# Patient Record
Sex: FEMALE | Race: WHITE | Employment: OTHER | URBAN - METROPOLITAN AREA
[De-identification: names, ages, dates, MRNs, and addresses within clinical notes are randomized per-mention and may not be internally consistent; named-entity substitution may affect disease eponyms.]

---

## 2017-03-13 PROBLEM — G45.9 TIA (TRANSIENT ISCHEMIC ATTACK): Status: ACTIVE | Noted: 2017-03-13

## 2017-03-13 PROBLEM — I10 ESSENTIAL HYPERTENSION, BENIGN: Status: ACTIVE | Noted: 2017-03-13

## 2017-05-09 ENCOUNTER — HOSPITAL ENCOUNTER (OUTPATIENT)
Dept: MAMMOGRAPHY | Age: 82
Discharge: HOME | End: 2017-05-09
Attending: FAMILY MEDICINE
Payer: MEDICARE

## 2017-05-09 DIAGNOSIS — Z12.31 VISIT FOR SCREENING MAMMOGRAM: ICD-10-CM

## 2017-05-09 PROCEDURE — 77067 SCR MAMMO BI INCL CAD: CPT

## 2017-07-14 ENCOUNTER — HOSPITAL ENCOUNTER (OUTPATIENT)
Dept: INFUSION THERAPY | Age: 82
Discharge: HOME | End: 2017-07-14
Payer: MEDICARE

## 2017-07-14 VITALS
OXYGEN SATURATION: 100 % | HEART RATE: 68 BPM | TEMPERATURE: 98 F | SYSTOLIC BLOOD PRESSURE: 135 MMHG | DIASTOLIC BLOOD PRESSURE: 56 MMHG | RESPIRATION RATE: 16 BRPM

## 2017-07-14 LAB — CALCIUM SERPL-MCNC: 8.9 MG/DL (ref 8.3–10.4)

## 2017-07-14 PROCEDURE — 74011250636 HC RX REV CODE- 250/636: Performed by: FAMILY MEDICINE

## 2017-07-14 PROCEDURE — 82310 ASSAY OF CALCIUM: CPT | Performed by: FAMILY MEDICINE

## 2017-07-14 PROCEDURE — 96372 THER/PROPH/DIAG INJ SC/IM: CPT

## 2017-07-14 RX ADMIN — DENOSUMAB 60 MG: 60 INJECTION SUBCUTANEOUS at 14:30

## 2017-07-14 NOTE — PROGRESS NOTES
Arrived to the [facility]. Prolia injection completed. Patient tolerated well and was observed post injection for 30 minutes. Any issues or concerns during appointment: NO.  Pt will have follow up with  PCP before next appointment. Discharged ambulatory.

## 2017-07-14 NOTE — PROGRESS NOTES
Calcium lab drawn from Right antecubital with a 23G butterfly vacutainer set. Pt does not want to wait for lab results since she has another  Appointment. She will return this afternoon for her prolia injection.

## 2017-12-04 ENCOUNTER — HOSPITAL ENCOUNTER (OUTPATIENT)
Dept: LAB | Age: 82
Discharge: HOME | End: 2017-12-04
Payer: MEDICARE

## 2017-12-04 DIAGNOSIS — D64.9 ANEMIA, UNSPECIFIED TYPE: ICD-10-CM

## 2017-12-04 PROBLEM — D63.1 ANEMIA IN CHRONIC KIDNEY DISEASE: Status: ACTIVE | Noted: 2017-12-04

## 2017-12-04 PROBLEM — N18.9 ANEMIA IN CHRONIC KIDNEY DISEASE: Status: ACTIVE | Noted: 2017-12-04

## 2017-12-04 LAB
ALBUMIN SERPL-MCNC: 3.4 G/DL (ref 3.2–4.6)
ALBUMIN/GLOB SERPL: 0.9 {RATIO} (ref 1.2–3.5)
ALP SERPL-CCNC: 57 U/L (ref 50–136)
ALT SERPL-CCNC: 14 U/L (ref 12–65)
ANION GAP SERPL CALC-SCNC: 7 MMOL/L (ref 7–16)
AST SERPL-CCNC: 17 U/L (ref 15–37)
BASOPHILS # BLD: 0 K/UL (ref 0–0.2)
BASOPHILS NFR BLD: 0 % (ref 0–2)
BILIRUB SERPL-MCNC: 0.3 MG/DL (ref 0.2–1.1)
BUN SERPL-MCNC: 9 MG/DL (ref 8–23)
CALCIUM SERPL-MCNC: 8.2 MG/DL (ref 8.3–10.4)
CHLORIDE SERPL-SCNC: 109 MMOL/L (ref 98–107)
CO2 SERPL-SCNC: 26 MMOL/L (ref 21–32)
CREAT SERPL-MCNC: 0.62 MG/DL (ref 0.6–1)
DIFFERENTIAL METHOD BLD: ABNORMAL
EOSINOPHIL # BLD: 0 K/UL (ref 0–0.8)
EOSINOPHIL NFR BLD: 0 % (ref 0.5–7.8)
ERYTHROCYTE [DISTWIDTH] IN BLOOD BY AUTOMATED COUNT: 18.9 % (ref 11.9–14.6)
FERRITIN SERPL-MCNC: 3 NG/ML (ref 8–388)
FOLATE SERPL-MCNC: 8.2 NG/ML (ref 3.1–17.5)
GLOBULIN SER CALC-MCNC: 3.7 G/DL (ref 2.3–3.5)
GLUCOSE SERPL-MCNC: 84 MG/DL (ref 65–100)
HCT VFR BLD AUTO: 27.8 % (ref 35.8–46.3)
HGB BLD-MCNC: 8 G/DL (ref 11.7–15.4)
IRON SATN MFR SERPL: 3 %
IRON SERPL-MCNC: 15 UG/DL (ref 35–150)
LYMPHOCYTES # BLD: 1.3 K/UL (ref 0.5–4.6)
LYMPHOCYTES NFR BLD: 20 % (ref 13–44)
MCH RBC QN AUTO: 19.5 PG (ref 26.1–32.9)
MCHC RBC AUTO-ENTMCNC: 28.8 G/DL (ref 31.4–35)
MCV RBC AUTO: 67.8 FL (ref 79.6–97.8)
MONOCYTES # BLD: 0.7 K/UL (ref 0.1–1.3)
MONOCYTES NFR BLD: 11 % (ref 4–12)
NEUTS SEG # BLD: 4.6 K/UL (ref 1.7–8.2)
NEUTS SEG NFR BLD: 69 % (ref 43–78)
NRBC # BLD: 0 K/UL (ref 0–0.2)
NRBC BLD-RTO: 0 PER 100 WBC (ref 0–2)
PLATELET # BLD AUTO: 256 K/UL (ref 150–450)
PMV BLD AUTO: 8.3 FL (ref 10.8–14.1)
POTASSIUM SERPL-SCNC: 3.5 MMOL/L (ref 3.5–5.1)
PROT SERPL-MCNC: 7.1 G/DL (ref 6.3–8.2)
RBC # BLD AUTO: 4.1 M/UL (ref 4.05–5.25)
SODIUM SERPL-SCNC: 142 MMOL/L (ref 136–145)
TIBC SERPL-MCNC: 477 UG/DL (ref 250–450)
VIT B12 SERPL-MCNC: 151 PG/ML (ref 193–986)
WBC # BLD AUTO: 6.6 K/UL (ref 4.3–11.1)

## 2017-12-04 PROCEDURE — 80053 COMPREHEN METABOLIC PANEL: CPT | Performed by: INTERNAL MEDICINE

## 2017-12-04 PROCEDURE — 82746 ASSAY OF FOLIC ACID SERUM: CPT | Performed by: INTERNAL MEDICINE

## 2017-12-04 PROCEDURE — 85025 COMPLETE CBC W/AUTO DIFF WBC: CPT | Performed by: INTERNAL MEDICINE

## 2017-12-04 PROCEDURE — 82607 VITAMIN B-12: CPT | Performed by: INTERNAL MEDICINE

## 2017-12-04 PROCEDURE — 83540 ASSAY OF IRON: CPT | Performed by: INTERNAL MEDICINE

## 2017-12-04 PROCEDURE — 36415 COLL VENOUS BLD VENIPUNCTURE: CPT | Performed by: INTERNAL MEDICINE

## 2017-12-04 PROCEDURE — 82728 ASSAY OF FERRITIN: CPT | Performed by: INTERNAL MEDICINE

## 2017-12-08 ENCOUNTER — HOSPITAL ENCOUNTER (OUTPATIENT)
Dept: INFUSION THERAPY | Age: 82
End: 2017-12-08
Payer: MEDICARE

## 2017-12-09 ENCOUNTER — HOSPITAL ENCOUNTER (OUTPATIENT)
Dept: INFUSION THERAPY | Age: 82
Discharge: HOME | End: 2017-12-09
Payer: MEDICARE

## 2017-12-09 VITALS
HEART RATE: 70 BPM | TEMPERATURE: 98.1 F | SYSTOLIC BLOOD PRESSURE: 177 MMHG | RESPIRATION RATE: 18 BRPM | DIASTOLIC BLOOD PRESSURE: 69 MMHG | OXYGEN SATURATION: 97 %

## 2017-12-09 DIAGNOSIS — N18.9 ANEMIA IN CHRONIC KIDNEY DISEASE, UNSPECIFIED CKD STAGE: ICD-10-CM

## 2017-12-09 DIAGNOSIS — D63.1 ANEMIA IN CHRONIC KIDNEY DISEASE, UNSPECIFIED CKD STAGE: ICD-10-CM

## 2017-12-09 PROCEDURE — 74011250636 HC RX REV CODE- 250/636: Performed by: INTERNAL MEDICINE

## 2017-12-09 PROCEDURE — 96372 THER/PROPH/DIAG INJ SC/IM: CPT

## 2017-12-09 PROCEDURE — 96365 THER/PROPH/DIAG IV INF INIT: CPT

## 2017-12-09 RX ORDER — CYANOCOBALAMIN 1000 UG/ML
1000 INJECTION, SOLUTION INTRAMUSCULAR; SUBCUTANEOUS ONCE
Status: COMPLETED | OUTPATIENT
Start: 2017-12-09 | End: 2017-12-09

## 2017-12-09 RX ORDER — SODIUM CHLORIDE 0.9 % (FLUSH) 0.9 %
10 SYRINGE (ML) INJECTION AS NEEDED
Status: ACTIVE | OUTPATIENT
Start: 2017-12-09 | End: 2017-12-10

## 2017-12-09 RX ADMIN — SODIUM CHLORIDE 500 ML: 900 INJECTION, SOLUTION INTRAVENOUS at 13:58

## 2017-12-09 RX ADMIN — FERRIC CARBOXYMALTOSE INJECTION 750 MG: 50 INJECTION, SOLUTION INTRAVENOUS at 14:02

## 2017-12-09 RX ADMIN — CYANOCOBALAMIN 1000 MCG: 1000 INJECTION, SOLUTION INTRAMUSCULAR at 14:55

## 2017-12-09 NOTE — PROGRESS NOTES
Arrived to [facility] infusion. Injectafer and B12 IM completed and tolerated well. No questions or concerns noted. Pt aware of next infusion appointment on 12/18/17 at 3pm. Discharged ambulatory with .

## 2017-12-18 ENCOUNTER — HOSPITAL ENCOUNTER (OUTPATIENT)
Dept: INFUSION THERAPY | Age: 82
Discharge: HOME | End: 2017-12-18
Payer: MEDICARE

## 2017-12-18 VITALS
HEART RATE: 63 BPM | WEIGHT: 127 LBS | RESPIRATION RATE: 18 BRPM | TEMPERATURE: 98 F | OXYGEN SATURATION: 63 % | DIASTOLIC BLOOD PRESSURE: 76 MMHG | BODY MASS INDEX: 23.23 KG/M2 | SYSTOLIC BLOOD PRESSURE: 193 MMHG

## 2017-12-18 DIAGNOSIS — D63.1 ANEMIA IN CHRONIC KIDNEY DISEASE, UNSPECIFIED CKD STAGE: ICD-10-CM

## 2017-12-18 DIAGNOSIS — N18.9 ANEMIA IN CHRONIC KIDNEY DISEASE, UNSPECIFIED CKD STAGE: ICD-10-CM

## 2017-12-18 PROCEDURE — 96365 THER/PROPH/DIAG IV INF INIT: CPT

## 2017-12-18 PROCEDURE — 74011250636 HC RX REV CODE- 250/636: Performed by: INTERNAL MEDICINE

## 2017-12-18 PROCEDURE — 96361 HYDRATE IV INFUSION ADD-ON: CPT

## 2017-12-18 PROCEDURE — 96372 THER/PROPH/DIAG INJ SC/IM: CPT

## 2017-12-18 RX ORDER — SODIUM CHLORIDE 0.9 % (FLUSH) 0.9 %
10 SYRINGE (ML) INJECTION AS NEEDED
Status: ACTIVE | OUTPATIENT
Start: 2017-12-18 | End: 2017-12-19

## 2017-12-18 RX ORDER — CYANOCOBALAMIN 1000 UG/ML
1000 INJECTION, SOLUTION INTRAMUSCULAR; SUBCUTANEOUS ONCE
Status: COMPLETED | OUTPATIENT
Start: 2017-12-18 | End: 2017-12-18

## 2017-12-18 RX ADMIN — Medication 10 ML: at 15:23

## 2017-12-18 RX ADMIN — CYANOCOBALAMIN 1000 MCG: 1000 INJECTION, SOLUTION INTRAMUSCULAR at 15:22

## 2017-12-18 RX ADMIN — SODIUM CHLORIDE 500 ML: 900 INJECTION, SOLUTION INTRAVENOUS at 15:45

## 2017-12-18 RX ADMIN — FERRIC CARBOXYMALTOSE INJECTION 750 MG: 50 INJECTION, SOLUTION INTRAVENOUS at 15:25

## 2017-12-18 NOTE — PROGRESS NOTES
Arrived to the [facility]. Injectafer/Vit B 12 completed. Patient tolerated well. Any issues or concerns during appointment: None. Patient aware of next appointment on March 9th at 21 [phone] with [facility]. Discharged ambulatory.

## 2018-02-19 ENCOUNTER — HOSPITAL ENCOUNTER (OUTPATIENT)
Dept: MAMMOGRAPHY | Age: 83
Discharge: HOME | End: 2018-02-19
Attending: FAMILY MEDICINE
Payer: MEDICARE

## 2018-02-19 DIAGNOSIS — N63.25 BREAST LUMP ON LEFT SIDE AT 3 O'CLOCK POSITION: ICD-10-CM

## 2018-02-19 PROCEDURE — 77066 DX MAMMO INCL CAD BI: CPT

## 2018-02-19 PROCEDURE — 76642 ULTRASOUND BREAST LIMITED: CPT

## 2018-03-09 ENCOUNTER — HOSPITAL ENCOUNTER (OUTPATIENT)
Dept: LAB | Age: 83
Discharge: HOME | End: 2018-03-09
Payer: MEDICARE

## 2018-03-09 DIAGNOSIS — N18.9 ANEMIA IN CHRONIC KIDNEY DISEASE, UNSPECIFIED CKD STAGE: ICD-10-CM

## 2018-03-09 DIAGNOSIS — D63.1 ANEMIA IN CHRONIC KIDNEY DISEASE, UNSPECIFIED CKD STAGE: ICD-10-CM

## 2018-03-09 LAB
BASOPHILS # BLD: 0 K/UL (ref 0–0.2)
BASOPHILS NFR BLD: 0 % (ref 0–2)
DIFFERENTIAL METHOD BLD: ABNORMAL
EOSINOPHIL # BLD: 0 K/UL (ref 0–0.8)
EOSINOPHIL NFR BLD: 1 % (ref 0.5–7.8)
ERYTHROCYTE [DISTWIDTH] IN BLOOD BY AUTOMATED COUNT: 19.2 % (ref 11.9–14.6)
FERRITIN SERPL-MCNC: 197 NG/ML (ref 8–388)
HCT VFR BLD AUTO: 38.9 % (ref 35.8–46.3)
HGB BLD-MCNC: 13.1 G/DL (ref 11.7–15.4)
IRON SATN MFR SERPL: 30 %
IRON SERPL-MCNC: 78 UG/DL (ref 35–150)
LYMPHOCYTES # BLD: 1.4 K/UL (ref 0.5–4.6)
LYMPHOCYTES NFR BLD: 34 % (ref 13–44)
MCH RBC QN AUTO: 30 PG (ref 26.1–32.9)
MCHC RBC AUTO-ENTMCNC: 33.7 G/DL (ref 31.4–35)
MCV RBC AUTO: 89 FL (ref 79.6–97.8)
MONOCYTES # BLD: 0.4 K/UL (ref 0.1–1.3)
MONOCYTES NFR BLD: 11 % (ref 4–12)
NEUTS SEG # BLD: 2.2 K/UL (ref 1.7–8.2)
NEUTS SEG NFR BLD: 55 % (ref 43–78)
NRBC # BLD: 0 K/UL (ref 0–0.2)
PLATELET # BLD AUTO: 160 K/UL (ref 150–450)
PMV BLD AUTO: 8.5 FL (ref 10.8–14.1)
RBC # BLD AUTO: 4.37 M/UL (ref 4.05–5.25)
TIBC SERPL-MCNC: 260 UG/DL (ref 250–450)
VIT B12 SERPL-MCNC: 289 PG/ML (ref 193–986)
WBC # BLD AUTO: 4.1 K/UL (ref 4.3–11.1)

## 2018-03-09 PROCEDURE — 85025 COMPLETE CBC W/AUTO DIFF WBC: CPT | Performed by: INTERNAL MEDICINE

## 2018-03-09 PROCEDURE — 36415 COLL VENOUS BLD VENIPUNCTURE: CPT | Performed by: INTERNAL MEDICINE

## 2018-03-09 PROCEDURE — 82607 VITAMIN B-12: CPT | Performed by: INTERNAL MEDICINE

## 2018-03-09 PROCEDURE — 82728 ASSAY OF FERRITIN: CPT | Performed by: INTERNAL MEDICINE

## 2018-03-09 PROCEDURE — 83540 ASSAY OF IRON: CPT | Performed by: INTERNAL MEDICINE

## 2018-04-16 PROBLEM — D50.9 IRON DEFICIENCY ANEMIA: Status: ACTIVE | Noted: 2018-04-16

## 2019-01-17 ENCOUNTER — HOSPITAL ENCOUNTER (OUTPATIENT)
Dept: LAB | Age: 84
Discharge: HOME | End: 2019-01-17
Payer: MEDICARE

## 2019-01-17 DIAGNOSIS — I25.119 ATHEROSCLEROSIS OF NATIVE CORONARY ARTERY OF NATIVE HEART WITH ANGINA PECTORIS (HCC): ICD-10-CM

## 2019-01-17 DIAGNOSIS — E78.5 HYPERLIPIDEMIA, UNSPECIFIED HYPERLIPIDEMIA TYPE: ICD-10-CM

## 2019-01-17 DIAGNOSIS — I10 ESSENTIAL HYPERTENSION, BENIGN: ICD-10-CM

## 2019-01-17 LAB
BASOPHILS # BLD: 0 K/UL (ref 0–0.2)
BASOPHILS NFR BLD: 0 % (ref 0–2)
DIFFERENTIAL METHOD BLD: ABNORMAL
EOSINOPHIL # BLD: 0 K/UL (ref 0–0.8)
EOSINOPHIL NFR BLD: 0 % (ref 0.5–7.8)
ERYTHROCYTE [DISTWIDTH] IN BLOOD BY AUTOMATED COUNT: 13.1 % (ref 11.9–14.6)
HCT VFR BLD AUTO: 43.8 % (ref 35.8–46.3)
HGB BLD-MCNC: 14.6 G/DL (ref 11.7–15.4)
IMM GRANULOCYTES # BLD AUTO: 0 K/UL (ref 0–0.5)
IMM GRANULOCYTES NFR BLD AUTO: 0 % (ref 0–5)
LYMPHOCYTES # BLD: 1.8 K/UL (ref 0.5–4.6)
LYMPHOCYTES NFR BLD: 39 % (ref 13–44)
MCH RBC QN AUTO: 31.1 PG (ref 26.1–32.9)
MCHC RBC AUTO-ENTMCNC: 33.3 G/DL (ref 31.4–35)
MCV RBC AUTO: 93.2 FL (ref 79.6–97.8)
MONOCYTES # BLD: 0.5 K/UL (ref 0.1–1.3)
MONOCYTES NFR BLD: 10 % (ref 4–12)
NEUTS SEG # BLD: 2.4 K/UL (ref 1.7–8.2)
NEUTS SEG NFR BLD: 51 % (ref 43–78)
NRBC # BLD: 0 K/UL (ref 0–0.2)
PLATELET # BLD AUTO: 188 K/UL (ref 150–450)
PMV BLD AUTO: 9.1 FL (ref 9.4–12.3)
RBC # BLD AUTO: 4.7 M/UL (ref 4.05–5.2)
WBC # BLD AUTO: 4.7 K/UL (ref 4.3–11.1)

## 2019-01-17 PROCEDURE — 85025 COMPLETE CBC W/AUTO DIFF WBC: CPT

## 2019-01-17 PROCEDURE — 36415 COLL VENOUS BLD VENIPUNCTURE: CPT

## 2019-01-18 ENCOUNTER — HOSPITAL ENCOUNTER (OUTPATIENT)
Dept: CARDIAC CATH/INVASIVE PROCEDURES | Age: 84
Discharge: HOME | End: 2019-01-18
Attending: INTERNAL MEDICINE | Admitting: INTERNAL MEDICINE
Payer: MEDICARE

## 2019-01-18 VITALS
TEMPERATURE: 98.1 F | DIASTOLIC BLOOD PRESSURE: 75 MMHG | HEIGHT: 62 IN | BODY MASS INDEX: 21.53 KG/M2 | OXYGEN SATURATION: 95 % | WEIGHT: 117 LBS | SYSTOLIC BLOOD PRESSURE: 172 MMHG | HEART RATE: 60 BPM

## 2019-01-18 LAB
ALBUMIN SERPL-MCNC: 3.8 G/DL (ref 3.2–4.6)
ALBUMIN/GLOB SERPL: 1 {RATIO} (ref 1.2–3.5)
ALP SERPL-CCNC: 96 U/L (ref 50–136)
ALT SERPL-CCNC: 20 U/L (ref 12–65)
ANION GAP SERPL CALC-SCNC: 9 MMOL/L (ref 7–16)
AST SERPL-CCNC: 19 U/L (ref 15–37)
ATRIAL RATE: 65 BPM
BILIRUB SERPL-MCNC: 0.4 MG/DL (ref 0.2–1.1)
BUN SERPL-MCNC: 11 MG/DL (ref 8–23)
CALCIUM SERPL-MCNC: 8.9 MG/DL (ref 8.3–10.4)
CALCULATED P AXIS, ECG09: 25 DEGREES
CALCULATED R AXIS, ECG10: 48 DEGREES
CALCULATED T AXIS, ECG11: 62 DEGREES
CHLORIDE SERPL-SCNC: 105 MMOL/L (ref 98–107)
CO2 SERPL-SCNC: 27 MMOL/L (ref 21–32)
CREAT SERPL-MCNC: 0.7 MG/DL (ref 0.6–1)
DIAGNOSIS, 93000: NORMAL
GLOBULIN SER CALC-MCNC: 3.7 G/DL (ref 2.3–3.5)
GLUCOSE SERPL-MCNC: 93 MG/DL (ref 65–100)
INR PPP: 1
P-R INTERVAL, ECG05: 162 MS
POTASSIUM SERPL-SCNC: 3.5 MMOL/L (ref 3.5–5.1)
PROT SERPL-MCNC: 7.5 G/DL (ref 6.3–8.2)
PROTHROMBIN TIME: 13.4 SEC (ref 11.7–14.5)
Q-T INTERVAL, ECG07: 434 MS
QRS DURATION, ECG06: 82 MS
QTC CALCULATION (BEZET), ECG08: 451 MS
SODIUM SERPL-SCNC: 141 MMOL/L (ref 136–145)
VENTRICULAR RATE, ECG03: 65 BPM

## 2019-01-18 PROCEDURE — 74011250637 HC RX REV CODE- 250/637: Performed by: INTERNAL MEDICINE

## 2019-01-18 PROCEDURE — C1894 INTRO/SHEATH, NON-LASER: HCPCS

## 2019-01-18 PROCEDURE — C1769 GUIDE WIRE: HCPCS

## 2019-01-18 PROCEDURE — 77030004534 HC CATH ANGI DX INFN CARD -A

## 2019-01-18 PROCEDURE — 80053 COMPREHEN METABOLIC PANEL: CPT

## 2019-01-18 PROCEDURE — 93458 L HRT ARTERY/VENTRICLE ANGIO: CPT

## 2019-01-18 PROCEDURE — 93005 ELECTROCARDIOGRAM TRACING: CPT | Performed by: INTERNAL MEDICINE

## 2019-01-18 PROCEDURE — 85610 PROTHROMBIN TIME: CPT

## 2019-01-18 PROCEDURE — 77030019569 HC BND COMPR RAD TERU -B

## 2019-01-18 PROCEDURE — 99152 MOD SED SAME PHYS/QHP 5/>YRS: CPT

## 2019-01-18 PROCEDURE — 74011000250 HC RX REV CODE- 250: Performed by: INTERNAL MEDICINE

## 2019-01-18 PROCEDURE — 74011250636 HC RX REV CODE- 250/636: Performed by: INTERNAL MEDICINE

## 2019-01-18 PROCEDURE — 74011250636 HC RX REV CODE- 250/636

## 2019-01-18 PROCEDURE — 74011636320 HC RX REV CODE- 636/320: Performed by: INTERNAL MEDICINE

## 2019-01-18 RX ORDER — MIDAZOLAM HYDROCHLORIDE 1 MG/ML
.5-2 INJECTION, SOLUTION INTRAMUSCULAR; INTRAVENOUS
Status: DISCONTINUED | OUTPATIENT
Start: 2019-01-18 | End: 2019-01-18 | Stop reason: HOSPADM

## 2019-01-18 RX ORDER — GUAIFENESIN 100 MG/5ML
81-324 LIQUID (ML) ORAL ONCE
Status: COMPLETED | OUTPATIENT
Start: 2019-01-18 | End: 2019-01-18

## 2019-01-18 RX ORDER — LIDOCAINE HYDROCHLORIDE 10 MG/ML
1-5 INJECTION INFILTRATION; PERINEURAL ONCE
Status: COMPLETED | OUTPATIENT
Start: 2019-01-18 | End: 2019-01-18

## 2019-01-18 RX ORDER — SODIUM CHLORIDE 9 MG/ML
75 INJECTION, SOLUTION INTRAVENOUS CONTINUOUS
Status: DISCONTINUED | OUTPATIENT
Start: 2019-01-18 | End: 2019-01-18 | Stop reason: HOSPADM

## 2019-01-18 RX ORDER — HEPARIN SODIUM 200 [USP'U]/100ML
3 INJECTION, SOLUTION INTRAVENOUS CONTINUOUS
Status: DISCONTINUED | OUTPATIENT
Start: 2019-01-18 | End: 2019-01-18 | Stop reason: HOSPADM

## 2019-01-18 RX ORDER — DIAZEPAM 5 MG/1
5 TABLET ORAL ONCE
Status: DISCONTINUED | OUTPATIENT
Start: 2019-01-18 | End: 2019-01-18 | Stop reason: HOSPADM

## 2019-01-18 RX ADMIN — MIDAZOLAM HYDROCHLORIDE 2 MG: 1 INJECTION, SOLUTION INTRAMUSCULAR; INTRAVENOUS at 17:55

## 2019-01-18 RX ADMIN — HEPARIN SODIUM 2 ML: 10000 INJECTION INTRAVENOUS; SUBCUTANEOUS at 17:55

## 2019-01-18 RX ADMIN — IOPAMIDOL 70 ML: 755 INJECTION, SOLUTION INTRAVENOUS at 18:05

## 2019-01-18 RX ADMIN — SODIUM CHLORIDE 75 ML/HR: 900 INJECTION, SOLUTION INTRAVENOUS at 14:59

## 2019-01-18 RX ADMIN — ASPIRIN 81 MG 243 MG: 81 TABLET ORAL at 14:59

## 2019-01-18 RX ADMIN — LIDOCAINE HYDROCHLORIDE 3 ML: 10 INJECTION, SOLUTION INFILTRATION; PERINEURAL at 17:55

## 2019-01-18 RX ADMIN — HEPARIN SODIUM 3 ML/HR: 5000 INJECTION, SOLUTION INTRAVENOUS; SUBCUTANEOUS at 17:55

## 2019-01-18 NOTE — PROGRESS NOTES
TRANSFER - IN REPORT: 
 
Verbal report received from [facility] RN(name) on [address]  being received from CCL(unit) for routine post - op Report consisted of patients Situation, Background, Assessment and  
Recommendations(SBAR). Information from the following report(s) Procedure Summary was reviewed with the receiving nurse. Opportunity for questions and clarification was provided. Assessment completed upon patients arrival to unit and care assumed.

## 2019-01-18 NOTE — PROGRESS NOTES
Pt arrived, ambulated to room with no visible problems, planned [facility] for Dr Diaz Overall. Consent signed, Procedure discussed with pt all questions answered voiced understanding. Medications and history discussed with pt. Pt prepped per ordersThe patient has a fraility score of 5-MILDLY FRAIL, based on ability to complete ADLs without assistance, recent fall before admission.

## 2019-01-18 NOTE — PROGRESS NOTES
TRANSFER - OUT REPORT: 
 
R radial diagnostic C with Dr Sadaf Diallo Versed 2 mg 
TR band 14 ml at 1805 No bleeding or hematoma noted at site. Site soft Verbal report given to Denisha(name) on [facility]  being transferred to [facility](unit) for routine progression of care Report consisted of patients Situation, Background, Assessment and  
Recommendations(SBAR). Information from the following report(s) Procedure Summary was reviewed with the receiving nurse. Lines:  
Peripheral IV 01/18/19 Right Antecubital (Active) Peripheral IV 01/18/19 Left Antecubital (Active) Opportunity for questions and clarification was provided. Patient transported with: 
 Registered Nurse

## 2019-01-19 NOTE — PROCEDURES
[address] CATH    Marla Raymundo  MR#: [id number]  : 1932  ACCOUNT #: [de-identified]   DATE OF SERVICE: 2019    PROCEDURE PERFORMED:  Cardiac catheterization. HISTORY:  This is an elderly lady with new onset angina pectoris. A cardiac catheterization was recommended. PROCEDURE:  Left heart catheterization with left ventriculography and coronary angiography was carried out from the right radial artery by modified Seldinger technique with a 5-English multipurpose catheter. She tolerated the procedure well. FINDINGS:  The central aortic pressure is 120/70 mmHg. The left ventricular end diastolic pressure is 12 mmHg. There is no gradient on pullback across the aortic valve. The overall left ventricular size is normal.  The wall motion is hyperdynamic. Ejection fraction is 70%. Coronary angiography reveals the right coronary artery to be a big vessel. There is minor atherosclerotic irregularity. The left main is normal.  It divides into LAD and left circumflex. The LAD has mild atherosclerotic irregularity in its proximal portion. There is some kinking of the vessel in the distal segment. This could be a source of anginal chest pain. The left circumflex is normal.    IMPRESSION:  1. Normal left ventricular function. 2.  Minor coronary artery disease. At the junction of the middle and distal left anterior descending segments there is focal kinking of the vessel which could be a potential source of angina. PLAN:  Enhanced medical therapy.       MD JUAN Bertrand /   D: 2019 18:08     T: 2019 00:36  JOB #: [id number]

## 2019-01-19 NOTE — PROGRESS NOTES
Discharge instructions reviewed with patient including post cath care. INTs removed with cath intact. Patient stated verbal understanding of discharge instructions. Right radial puncture site covered with gauze and tegaderm.

## 2019-01-19 NOTE — DISCHARGE INSTRUCTIONS
Cardiac Catheterization/Angiography Discharge Instructions    *Check the puncture site frequently for swelling or bleeding. If you see any bleeding, lie down and apply pressure over the area with a clean town or washcloth. Notify your doctor for any redness, swelling, drainage or oozing from the puncture site. Notify your doctor for any fever or chills. *If the leg or arm with the puncture becomes cold, numb or painful, call Dr Ava Perez at  [phone]    *Activity should be limited for the next 48 hours. Climb stairs as little as possible and avoid any stooping, bending or strenuous activity for 48 hours. No heavy lifting (anything over 10 pounds) for three days. *Do not drive for 48 hours. *You may resume your usual diet. Drink more fluids than usual.    *Have a responsible person drive you home and stay with you for at least 24 hours after your heart catheterization/angiography. *You may remove the bandage from your Right and Arm in 24 hours. You may shower in 24 hours. No tub baths, hot tubs or swimming for one week. Do not place any lotions, creams, powders, ointments over the puncture site for one week. You may place a clean band-aid over the puncture site each day for 5 days. Change this daily.

## 2019-02-19 ENCOUNTER — ANESTHESIA EVENT (OUTPATIENT)
Dept: ENDOSCOPY | Age: 84
End: 2019-02-19
Payer: MEDICARE

## 2019-02-19 ENCOUNTER — HOSPITAL ENCOUNTER (OUTPATIENT)
Age: 84
Setting detail: OUTPATIENT SURGERY
Discharge: HOME | End: 2019-02-19
Attending: INTERNAL MEDICINE | Admitting: INTERNAL MEDICINE
Payer: MEDICARE

## 2019-02-19 ENCOUNTER — ANESTHESIA (OUTPATIENT)
Dept: ENDOSCOPY | Age: 84
End: 2019-02-19
Payer: MEDICARE

## 2019-02-19 VITALS
DIASTOLIC BLOOD PRESSURE: 72 MMHG | TEMPERATURE: 96.8 F | SYSTOLIC BLOOD PRESSURE: 176 MMHG | HEART RATE: 56 BPM | RESPIRATION RATE: 15 BRPM | OXYGEN SATURATION: 98 % | BODY MASS INDEX: 22.08 KG/M2 | HEIGHT: 62 IN | WEIGHT: 120 LBS

## 2019-02-19 PROCEDURE — 88312 SPECIAL STAINS GROUP 1: CPT

## 2019-02-19 PROCEDURE — 76060000031 HC ANESTHESIA FIRST 0.5 HR: Performed by: INTERNAL MEDICINE

## 2019-02-19 PROCEDURE — 88305 TISSUE EXAM BY PATHOLOGIST: CPT

## 2019-02-19 PROCEDURE — 76040000025: Performed by: INTERNAL MEDICINE

## 2019-02-19 PROCEDURE — 74011250636 HC RX REV CODE- 250/636: Performed by: INTERNAL MEDICINE

## 2019-02-19 PROCEDURE — 74011250636 HC RX REV CODE- 250/636

## 2019-02-19 PROCEDURE — 77030009426 HC FCPS BIOP ENDOSC BSC -B: Performed by: INTERNAL MEDICINE

## 2019-02-19 RX ORDER — SODIUM CHLORIDE, SODIUM LACTATE, POTASSIUM CHLORIDE, CALCIUM CHLORIDE 600; 310; 30; 20 MG/100ML; MG/100ML; MG/100ML; MG/100ML
1000 INJECTION, SOLUTION INTRAVENOUS CONTINUOUS
Status: DISCONTINUED | OUTPATIENT
Start: 2019-02-19 | End: 2019-02-19 | Stop reason: HOSPADM

## 2019-02-19 RX ORDER — PROPOFOL 10 MG/ML
INJECTION, EMULSION INTRAVENOUS
Status: DISCONTINUED | OUTPATIENT
Start: 2019-02-19 | End: 2019-02-19 | Stop reason: HOSPADM

## 2019-02-19 RX ORDER — PROPOFOL 10 MG/ML
INJECTION, EMULSION INTRAVENOUS AS NEEDED
Status: DISCONTINUED | OUTPATIENT
Start: 2019-02-19 | End: 2019-02-19 | Stop reason: HOSPADM

## 2019-02-19 RX ADMIN — PROPOFOL 50 MG: 10 INJECTION, EMULSION INTRAVENOUS at 08:49

## 2019-02-19 RX ADMIN — SODIUM CHLORIDE, SODIUM LACTATE, POTASSIUM CHLORIDE, AND CALCIUM CHLORIDE 1000 ML: 600; 310; 30; 20 INJECTION, SOLUTION INTRAVENOUS at 07:52

## 2019-02-19 RX ADMIN — PROPOFOL 160 MCG/KG/MIN: 10 INJECTION, EMULSION INTRAVENOUS at 08:49

## 2019-02-19 RX ADMIN — PROPOFOL 20 MG: 10 INJECTION, EMULSION INTRAVENOUS at 08:51

## 2019-02-19 NOTE — ROUTINE PROCESS
VSS. Discharge instructions reviewed with patient and spouse and copy of instructions sent home with patient. Dr. Leobardo Day spoke with patient and spouse prior to discharge. Questions answered. Discharged via private car, wheeled out by myself. IV discontinued prior to discharge. Personal items with patient at discharge: Clothing

## 2019-02-19 NOTE — PROCEDURES
Esophagogastroduodenoscopy    DATE of PROCEDURE: 2/19/2019    MEDICATIONS ADMINISTERED: MAC    INSTRUMENT: GIF    PROCEDURE:  After obtaining informed consent, the patient was placed in the left lateral position and sedated. The endoscope was advanced under direct vision without difficulty. The esophagus, stomach (including retroflexed views) and duodenum were evaluated. The patient was taken to the recovery area in stable condition. FINDINGS:  ESOPHAGUS:  There is a 4cm sliding hiatal hernia noted but the z-line is regular and no signs of barretts at present. STOMACH:  Normal and biopsies taken for h pylori  DUODENUM: Normal    Estimated blood loss: 0-minimal     PLAN:  1. Continue PPI once daily  2. F/up pathology  3.  Given post prandial abd pain and some bowel changes will proceed with CT Scan rather than colonoscopy given age (last colo was in 2011)    Bharathi Coronado MD  [facility]

## 2019-02-19 NOTE — ANESTHESIA PREPROCEDURE EVALUATION
Anesthetic History PONV Review of Systems / Medical History Patient summary reviewed Pulmonary Neuro/Psych  
 
 
 
TIA Cardiovascular Hypertension Dysrhythmias (Palpitations) CAD and hyperlipidemia Exercise tolerance: >4 METS Comments: Cath1/2019: 
IMPRESSION: 
1. Normal left ventricular function. 2.  Minor coronary artery disease. At the junction of the middle and distal left anterior descending segments there is focal kinking of the vessel which could be a potential source of angina GI/Hepatic/Renal 
  
GERD Renal disease: stones Endo/Other Arthritis Other Findings Physical Exam 
 
Airway Mallampati: II 
TM Distance: > 6 cm Neck ROM: normal range of motion Mouth opening: Normal 
 
 Cardiovascular Regular rate and rhythm,  S1 and S2 normal,  no murmur, click, rub, or gallop Dental 
No notable dental hx Pulmonary Breath sounds clear to auscultation Abdominal 
 
 
 
 Other Findings Anesthetic Plan ASA: 2 Anesthesia type: total IV anesthesia Anesthetic plan and risks discussed with: Patient

## 2019-02-19 NOTE — ANESTHESIA POSTPROCEDURE EVALUATION
Procedure(s): ESOPHAGOGASTRODUODENOSCOPY (EGD) BMI 22 ESOPHAGOGASTRODUODENAL (EGD) BIOPSY. Anesthesia Post Evaluation Patient location during evaluation: PACU Patient participation: complete - patient participated Level of consciousness: awake and alert Pain management: adequate Airway patency: patent Anesthetic complications: no 
Cardiovascular status: acceptable Respiratory status: acceptable Hydration status: acceptable Post anesthesia nausea and vomiting:  none Visit Vitals /72 Pulse (!) 56 Temp 36 °C (96.8 °F) Resp 15 Ht 5' 2\" (1.575 m) Wt 54.4 kg (120 lb) SpO2 98% BMI 21.95 kg/m²

## 2019-02-19 NOTE — DISCHARGE INSTRUCTIONS
Gastrointestinal Esophagogastroduodenoscopy (EGD) - Upper Exam Discharge Instructions    1. Call Dr. Dannie Colón for any problems or questions. 2. Contact the doctor's office for follow up appointment as directed. 3. Medication may cause drowsiness for several hours, therefore, do not drive or operate machinery for remainder of the day. 4. No alcohol today. 5. Ordinarily, you may resume regular diet and activity after exam unless otherwise specified by your physician. 6. For mild soreness in your throat you may use Cepacol throat lozenges or warm salt-water gargles as needed. Any additional instructions:      Continue Omeprazole 40 mg once a day.

## 2019-02-19 NOTE — H&P
Gastroenterology Outpatient History and Physical 
 
Patient: [facility] Physician: Barbie Hewitt MD 
 
Vital Signs: Blood pressure 145/65, pulse (!) 54, temperature 97.8 °F (36.6 °C), resp. rate 18, height 5' 2\" (1.575 m), weight 54.4 kg (120 lb), SpO2 97 %. 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 She has had some increased symptoms of GERD and weight loss. Justification for Procedure: eval barretts and ulcers in setting of [facility] History: 
Past Medical History:  
Diagnosis Date  Arrhythmia   
 medication controlled ; NSR on ekg dated 6-30-12-in EMR  Arthritis   
 tylenol as needed  Cancer ([facility] [address] 6-12  
 skin cancer removed from left leg  Chest pain 3/8/2016  Chronic pain   
 arthritis  Dyspnea 3/8/2016  GERD (gastroesophageal reflux disease)   
 well controlled with medication  Hyperlipidemia 3/8/2016  Hypertension   
 well controlled with medication  Kidney stone   
 once  Nausea & vomiting  Palpitations 3/8/2016  Supraventricular beat, premature 3/8/2016 Past Surgical History:  
Procedure Laterality Date  BREAST SURGERY PROCEDURE UNLISTED    
 dayanara benign lumps  HX APPENDECTOMY  HX BREAST BIOPSY  HX OTHER SURGICAL  6/2012  
 skin CA removed  HX MIKEY AND BSO    
 also, exp lap for endometriosis Social History Socioeconomic History  Marital status:  Spouse name: Not on file  Number of children: Not on file  Years of education: Not on file  Highest education level: Not on file Tobacco Use  Smoking status: Never Smoker  Smokeless tobacco: Never Used Substance and Sexual Activity  Alcohol use: No  
 Drug use: No  
  
Family History Problem Relation Age of Onset  Dementia Mother  Delayed Awakening Father  Heart Surgery Father Medications:  
Prior to Admission medications Medication Sig Start Date End Date Taking? Authorizing Provider  
omeprazole (PRILOSEC) 40 mg capsule  10/17/18  Yes Provider, Historical  
cyanocobalamin (VITAMIN B12) 500 mcg tablet Take  by mouth. Yes Provider, Historical  
metoprolol tartrate (LOPRESSOR) 25 mg tablet Take 1 Tab by mouth two (2) times a day. 1/17/19  Yes Harish Murphy MD  
[facility] 625 mg tablet  11/30/17  Yes Provider, Historical  
cholecalciferol (VITAMIN D3) 1,000 unit cap Take  by mouth daily. Yes Provider, Historical  
traMADol (ULTRAM) 50 mg tablet Take 50 mg by mouth every six (6) hours as needed for Pain. Yes Provider, Historical  
enalapril-hydrochlorthiazide (VASERETIC) 10-25 mg tablet Take 0.5 Tabs by mouth daily. Yes Provider, Historical  
rosuvastatin (CRESTOR) 40 mg tablet Take 40 mg by mouth nightly. NON FORMULARY   Yes Provider, Historical  
diphenhydramine-acetaminophen (TYLENOL PM EXTRA STRENGTH)  mg Tab Take  by mouth daily as needed. Per anesthesia protocol:instructed to take am of surgery if needed   Yes Provider, Historical  
promethazine (PHENERGAN) 25 mg tablet Take 25 mg by mouth every six (6) hours as needed. Yes Provider, Historical  
escitalopram oxalate (LEXAPRO) 20 mg tablet  12/17/18   Provider, Historical  
nitroglycerin (NITROSTAT) 0.4 mg SL tablet 1 Tab by SubLINGual route every five (5) minutes as needed for Chest Pain. 1/17/19   Harish Murphy MD  
 
 
Physical Exam:  
General: no distress HEENT: Head: Normocephalic, no lesions, without obvious abnormality. Heart: regular rate and rhythm, S1, S2 normal, no murmur, click, rub or gallop Lungs: chest clear, no wheezing, rales, normal symmetric air entry Abdominal: Bowel sounds are normal, liver is not enlarged, spleen is not enlarged Neurological: Grossly normal  
 Extremities: extremities normal, atraumatic, no cyanosis or edema Findings/Diagnosis: barretts, HH, weight loss Plan of Care/Planned Procedure: egd Signed By: Kya Marina MD   
 February 19, 2019

## 2019-03-01 ENCOUNTER — HOSPITAL ENCOUNTER (OUTPATIENT)
Dept: MAMMOGRAPHY | Age: 84
Discharge: HOME | End: 2019-03-01
Attending: FAMILY MEDICINE
Payer: MEDICARE

## 2019-03-01 DIAGNOSIS — Z12.31 VISIT FOR SCREENING MAMMOGRAM: ICD-10-CM

## 2019-03-01 PROCEDURE — 77067 SCR MAMMO BI INCL CAD: CPT

## 2019-04-24 PROBLEM — I25.10 ATHEROSCLEROSIS OF NATIVE CORONARY ARTERY OF NATIVE HEART WITHOUT ANGINA PECTORIS: Status: ACTIVE | Noted: 2019-01-17

## 2020-06-26 ENCOUNTER — HOSPITAL ENCOUNTER (OUTPATIENT)
Dept: MAMMOGRAPHY | Age: 85
Discharge: HOME | End: 2020-06-26
Attending: FAMILY MEDICINE
Payer: MEDICARE

## 2020-06-26 DIAGNOSIS — Z12.31 VISIT FOR SCREENING MAMMOGRAM: ICD-10-CM

## 2020-06-26 PROCEDURE — 77067 SCR MAMMO BI INCL CAD: CPT

## 2020-12-10 ENCOUNTER — HOSPITAL ENCOUNTER (OUTPATIENT)
Dept: LAB | Age: 85
Discharge: HOME | End: 2020-12-10

## 2020-12-10 PROCEDURE — 88312 SPECIAL STAINS GROUP 1: CPT

## 2020-12-10 PROCEDURE — 88305 TISSUE EXAM BY PATHOLOGIST: CPT

## 2021-06-04 ENCOUNTER — TRANSCRIBE ORDER (OUTPATIENT)
Dept: SCHEDULING | Age: 86
End: 2021-06-04

## 2021-06-04 DIAGNOSIS — Z12.31 SCREENING MAMMOGRAM FOR HIGH-RISK PATIENT: Primary | ICD-10-CM

## 2021-07-01 ENCOUNTER — HOSPITAL ENCOUNTER (OUTPATIENT)
Dept: MAMMOGRAPHY | Age: 86
Discharge: HOME | End: 2021-07-01
Attending: FAMILY MEDICINE
Payer: MEDICARE

## 2021-07-01 DIAGNOSIS — Z12.31 SCREENING MAMMOGRAM FOR HIGH-RISK PATIENT: ICD-10-CM

## 2021-07-01 PROCEDURE — 77067 SCR MAMMO BI INCL CAD: CPT

## 2022-03-18 PROBLEM — I25.10 ATHEROSCLEROSIS OF NATIVE CORONARY ARTERY OF NATIVE HEART WITHOUT ANGINA PECTORIS: Status: ACTIVE | Noted: 2019-01-17

## 2022-03-18 PROBLEM — D63.1 ANEMIA IN CHRONIC KIDNEY DISEASE: Status: ACTIVE | Noted: 2017-12-04

## 2022-03-18 PROBLEM — N18.9 ANEMIA IN CHRONIC KIDNEY DISEASE: Status: ACTIVE | Noted: 2017-12-04

## 2022-03-19 PROBLEM — I10 ESSENTIAL HYPERTENSION, BENIGN: Status: ACTIVE | Noted: 2017-03-13

## 2022-03-19 PROBLEM — D50.9 IRON DEFICIENCY ANEMIA: Status: ACTIVE | Noted: 2018-04-16

## 2022-03-19 PROBLEM — G45.9 TIA (TRANSIENT ISCHEMIC ATTACK): Status: ACTIVE | Noted: 2017-03-13

## 2023-02-22 ENCOUNTER — OFFICE VISIT (OUTPATIENT)
Dept: CARDIOLOGY CLINIC | Age: 88
End: 2023-02-22
Payer: MEDICARE

## 2023-02-22 VITALS
HEART RATE: 64 BPM | DIASTOLIC BLOOD PRESSURE: 70 MMHG | WEIGHT: 110 LBS | SYSTOLIC BLOOD PRESSURE: 128 MMHG | BODY MASS INDEX: 20.24 KG/M2 | HEIGHT: 62 IN

## 2023-02-22 DIAGNOSIS — R00.2 PALPITATIONS: ICD-10-CM

## 2023-02-22 DIAGNOSIS — I10 ESSENTIAL HYPERTENSION, BENIGN: ICD-10-CM

## 2023-02-22 DIAGNOSIS — I25.10 ATHEROSCLEROSIS OF NATIVE CORONARY ARTERY OF NATIVE HEART WITHOUT ANGINA PECTORIS: Primary | ICD-10-CM

## 2023-02-22 DIAGNOSIS — E78.00 PURE HYPERCHOLESTEROLEMIA: ICD-10-CM

## 2023-02-22 PROCEDURE — 93000 ELECTROCARDIOGRAM COMPLETE: CPT | Performed by: INTERNAL MEDICINE

## 2023-02-22 PROCEDURE — G8428 CUR MEDS NOT DOCUMENT: HCPCS | Performed by: INTERNAL MEDICINE

## 2023-02-22 PROCEDURE — 1123F ACP DISCUSS/DSCN MKR DOCD: CPT | Performed by: INTERNAL MEDICINE

## 2023-02-22 PROCEDURE — G8420 CALC BMI NORM PARAMETERS: HCPCS | Performed by: INTERNAL MEDICINE

## 2023-02-22 PROCEDURE — 1036F TOBACCO NON-USER: CPT | Performed by: INTERNAL MEDICINE

## 2023-02-22 PROCEDURE — G8484 FLU IMMUNIZE NO ADMIN: HCPCS | Performed by: INTERNAL MEDICINE

## 2023-02-22 PROCEDURE — 99214 OFFICE O/P EST MOD 30 MIN: CPT | Performed by: INTERNAL MEDICINE

## 2023-02-22 PROCEDURE — 1090F PRES/ABSN URINE INCON ASSESS: CPT | Performed by: INTERNAL MEDICINE

## 2023-02-22 RX ORDER — ZINC GLUCONATE 50 MG
0.5 TABLET ORAL DAILY
COMMUNITY

## 2023-02-22 RX ORDER — ASPIRIN 81 MG/1
81 TABLET ORAL DAILY
COMMUNITY

## 2023-02-22 RX ORDER — ASCORBIC ACID 500 MG
TABLET ORAL PRN
COMMUNITY

## 2023-02-22 NOTE — PROGRESS NOTES
[address], 1989 [facility], [address] [place] [address]  PHONE: [phone]     23    NAME:  Michael Johnson  : 1932  MRN: [id number]       SUBJECTIVE:   Michael Johnson is a 80 y.o. female seen for a follow up visit regarding the following:     Chief Complaint   Patient presents with    Coronary Artery Disease       HPI: Here for CAD. [facility] 2019:   1. Normal left ventricular function. 2.  Minor coronary artery disease. At the junction of the middle and distal left anterior descending segments there is focal kinking of the vessel which could be a potential source of angina.  since 2022. Been feeling \"run down\". Some GI issues with hiatal hernia, burping. Energy not good now, some more CARCAMO, poor exercise tolerance. Struggling now. Patient denies recent history of orthopnea, PND, excessive dizziness and/or syncope. Past Medical History, Past Surgical History, Family history, Social History, and Medications were all reviewed with the patient today and updated as necessary.      Current Outpatient Medications   Medication Sig Dispense Refill    aspirin 81 MG EC tablet Take 81 mg by mouth daily      Probiotic Product (ALIGN PO) Take by mouth daily      Cholecalciferol (VITAMIN D3) 125 MCG (5000 UT) CAPS Take by mouth daily      zinc 50 MG TABS tablet Take 0.5 mg by mouth daily      vitamin C (ASCORBIC ACID) 500 MG tablet Take by mouth as needed      acetaminophen (TYLENOL) 500 MG tablet Take by mouth every 6 hours as needed      colesevelam (WELCHOL) 625 MG tablet Take 625 mg by mouth 3 times daily      cyanocobalamin 500 MCG tablet Take 1,000 mcg by mouth      diclofenac sodium (VOLTAREN) 1 % GEL Apply topically 4 times daily      dicyclomine (BENTYL) 10 MG capsule Take 10 mg by mouth 3 times daily      enalapril-hydroCHLOROthiazide (VASERETIC) 10-25 MG per tablet Take 0.5 tablets by mouth daily      escitalopram (LEXAPRO) 20 MG tablet Take 20 mg by mouth daily Melatonin 10 MG TABS Take by mouth      nitroGLYCERIN (NITROSTAT) 0.4 MG SL tablet Place 0.4 mg under the tongue      omeprazole (PRILOSEC) 40 MG delayed release capsule Take 40 mg by mouth daily      ondansetron (ZOFRAN-ODT) 4 MG disintegrating tablet Place under the tongue every 6 hours as needed      polyethyl glycol-propyl glycol 0.4-0.3 % (SYSTANE) 0.4-0.3 % ophthalmic solution as needed      Probiotic Product (ACIDOPHILUS PROBIOTIC) CAPS capsule Take by mouth      rosuvastatin (CRESTOR) 40 MG tablet Take 20 mg by mouth      traMADol (ULTRAM) 50 MG tablet Take 50 mg by mouth.      mirabegron (MYRBETRIQ) 50 MG TB24 Take 50 mg by mouth daily      potassium chloride (KLOR-CON M) 10 MEQ extended release tablet Take 10 mEq by mouth daily (Patient not taking: Reported on 2/22/2023)       No current facility-administered medications for this visit. Allergies   Allergen Reactions    Penicillins Rash     Patient Active Problem List    Diagnosis Date Noted    Atherosclerosis of native coronary artery of native heart without angina pectoris 01/17/2019    Iron deficiency anemia 04/16/2018    Anemia in chronic kidney disease 12/04/2017    Essential hypertension, benign 03/13/2017    TIA (transient ischemic attack) 03/13/2017    Supraventricular beat, premature 03/08/2016    Palpitations 03/08/2016    Malaise and fatigue 03/08/2016    Hyperlipidemia 03/08/2016    Other chest pain 03/08/2016    Dyspnea 03/08/2016      Past Surgical History:   Procedure Laterality Date    APPENDECTOMY      BREAST BIOPSY      BREAST SURGERY      paula benign lumps     OTHER SURGICAL HISTORY  6/2012    skin CA removed    NAOMI AND BSO (CERVIX REMOVED)      also, exp lap for endometriosis     Family History   Problem Relation Age of Onset    Dementia Mother     Delayed Awakening Father     Heart Surgery Father      Social History     Tobacco Use    Smoking status: Never    Smokeless tobacco: Never   Substance Use Topics    Alcohol use:  No ROS:    No obvious pertinent positives on review of systems except for what was outlined in the HPI today. PHYSICAL EXAM:     /70   Pulse 64   Ht 5' 2\" (1.575 m)   Wt 110 lb (49.9 kg)   BMI 20.12 kg/m²    General/Constitutional:   Alert and oriented x 3, no acute distress  HEENT:   normocephalic, atraumatic, moist mucous membranes  Neck:   No JVD or carotid bruits bilaterally  Cardiovascular:   regular rate and rhythm, no murmur/rub/gallop appreciated  Pulmonary:   clear to auscultation bilaterally, no respiratory distress  Abdomen:   soft, non-tender, non-distended  Ext:   No sig LE edema bilaterally  Skin:  warm and dry, no obvious rashes seen  Neuro:   no obvious sensory or motor deficits  Psychiatric:   normal mood and affect    EKG Today and independently reviewed by me: sinus rhythm, normal intervals and non-specific ST/T wave changes. No results found for: NA, K, CL, CO2, BUN, CREATININE, GLUCOSE, CALCIUM     No results found for: WBC, HGB, HCT, MCV, PLT    No results found for: TSHFT4, TSH    No results found for: LABA1C  No results found for: EAG    No results found for: CHOL  No results found for: TRIG  No results found for: HDL  No results found for: LDLCHOLESTEROL, LDLCALC  No results found for: LABVLDL, VLDL  No results found for: CHOLHDLRATIO        I have Independently reviewed prior care notes, any ER records available, cardiac testing, labs and results with the patient and before seeing the patient today. Also independently reviewed outside records when available. ASSESSMENT:    Krishna Fernández was seen today for coronary artery disease. Diagnoses and all orders for this visit:    Atherosclerotic heart disease of native coronary artery without angina pectoris  -     EKG 12 Lead  -     Transthoracic echocardiogram (TTE) complete with contrast, bubble, strain, and 3D PRN;  Future    Essential hypertension, benign    Atherosclerosis of native coronary artery of native heart without angina pectoris    Palpitations    Pure hypercholesterolemia        PLAN:      Seeing new PCP now.    Will be getting labs as well.      1. HTN: white coat HTN.  On Ace/HCTZ.      Follow K and Cr.       2. CAD: more CARCAMO, angina?  , been stressful.  Check echo for now.      The patient has been instructed to call with any angina or equivalent as reviewed today. All questions were answered with the patient voicing complete understanding.           Patient has been instructed and agrees to call our office with any issues or other concerns related to their cardiac condition(s) and/or complaint(s).        Return for Return After Test.       Molina Ham DO  2/22/2023

## 2023-02-27 ENCOUNTER — HOME HEALTH ADMISSION (OUTPATIENT)
Dept: HOME HEALTH SERVICES | Facility: HOME HEALTH | Age: 88
End: 2023-02-27
Payer: MEDICARE

## 2023-02-27 ENCOUNTER — OFFICE VISIT (OUTPATIENT)
Dept: FAMILY MEDICINE CLINIC | Facility: CLINIC | Age: 88
End: 2023-02-27
Payer: MEDICARE

## 2023-02-27 VITALS
SYSTOLIC BLOOD PRESSURE: 124 MMHG | OXYGEN SATURATION: 98 % | WEIGHT: 109 LBS | BODY MASS INDEX: 20.06 KG/M2 | HEART RATE: 64 BPM | DIASTOLIC BLOOD PRESSURE: 60 MMHG | HEIGHT: 62 IN

## 2023-02-27 DIAGNOSIS — I10 PRIMARY HYPERTENSION: Primary | ICD-10-CM

## 2023-02-27 DIAGNOSIS — E78.00 PURE HYPERCHOLESTEROLEMIA: ICD-10-CM

## 2023-02-27 DIAGNOSIS — R29.6 FREQUENT FALLS: ICD-10-CM

## 2023-02-27 DIAGNOSIS — F34.1 PERSISTENT DEPRESSIVE DISORDER: ICD-10-CM

## 2023-02-27 DIAGNOSIS — I25.10 ATHEROSCLEROSIS OF NATIVE CORONARY ARTERY OF NATIVE HEART WITHOUT ANGINA PECTORIS: ICD-10-CM

## 2023-02-27 DIAGNOSIS — R26.89 BALANCE DISORDER: ICD-10-CM

## 2023-02-27 DIAGNOSIS — I25.10 CORONARY ARTERY DISEASE INVOLVING NATIVE CORONARY ARTERY OF NATIVE HEART WITHOUT ANGINA PECTORIS: ICD-10-CM

## 2023-02-27 PROBLEM — R63.4 WEIGHT LOSS: Status: ACTIVE | Noted: 2022-02-22

## 2023-02-27 PROBLEM — R30.0 DYSURIA: Status: ACTIVE | Noted: 2021-12-10

## 2023-02-27 PROBLEM — K22.70 BARRETT'S ESOPHAGUS WITHOUT DYSPLASIA: Status: ACTIVE | Noted: 2022-02-22

## 2023-02-27 PROBLEM — R63.0 LACK OF APPETITE: Status: ACTIVE | Noted: 2021-12-03

## 2023-02-27 PROBLEM — F32.A DEPRESSION: Status: ACTIVE | Noted: 2023-02-27

## 2023-02-27 PROBLEM — R10.32 LEFT LOWER QUADRANT ABDOMINAL PAIN: Status: ACTIVE | Noted: 2021-12-03

## 2023-02-27 LAB
ALBUMIN SERPL-MCNC: 3.6 G/DL (ref 3.2–4.6)
ALBUMIN/GLOB SERPL: 1.1 (ref 0.4–1.6)
ALP SERPL-CCNC: 78 U/L (ref 50–136)
ALT SERPL-CCNC: 20 U/L (ref 12–65)
ANION GAP SERPL CALC-SCNC: 6 MMOL/L (ref 2–11)
AST SERPL-CCNC: 19 U/L (ref 15–37)
BASOPHILS # BLD: 0 K/UL (ref 0–0.2)
BASOPHILS NFR BLD: 0 % (ref 0–2)
BILIRUB DIRECT SERPL-MCNC: 0.1 MG/DL
BILIRUB SERPL-MCNC: 0.6 MG/DL (ref 0.2–1.1)
BUN SERPL-MCNC: 13 MG/DL (ref 8–23)
CALCIUM SERPL-MCNC: 9 MG/DL (ref 8.3–10.4)
CHLORIDE SERPL-SCNC: 98 MMOL/L (ref 101–110)
CHOLEST SERPL-MCNC: 185 MG/DL
CO2 SERPL-SCNC: 28 MMOL/L (ref 21–32)
CREAT SERPL-MCNC: 0.7 MG/DL (ref 0.6–1)
DIFFERENTIAL METHOD BLD: ABNORMAL
EOSINOPHIL # BLD: 0.1 K/UL (ref 0–0.8)
EOSINOPHIL NFR BLD: 1 % (ref 0.5–7.8)
ERYTHROCYTE [DISTWIDTH] IN BLOOD BY AUTOMATED COUNT: 13.5 % (ref 11.9–14.6)
GLOBULIN SER CALC-MCNC: 3.2 G/DL (ref 2.8–4.5)
GLUCOSE SERPL-MCNC: 90 MG/DL (ref 65–100)
HCT VFR BLD AUTO: 37.5 % (ref 35.8–46.3)
HDLC SERPL-MCNC: 73 MG/DL (ref 40–60)
HDLC SERPL: 2.5
HGB BLD-MCNC: 12.4 G/DL (ref 11.7–15.4)
IMM GRANULOCYTES # BLD AUTO: 0 K/UL (ref 0–0.5)
IMM GRANULOCYTES NFR BLD AUTO: 0 % (ref 0–5)
LDLC SERPL CALC-MCNC: 91.8 MG/DL
LYMPHOCYTES # BLD: 1.5 K/UL (ref 0.5–4.6)
LYMPHOCYTES NFR BLD: 23 % (ref 13–44)
MCH RBC QN AUTO: 30.8 PG (ref 26.1–32.9)
MCHC RBC AUTO-ENTMCNC: 33.1 G/DL (ref 31.4–35)
MCV RBC AUTO: 93.3 FL (ref 82–102)
MONOCYTES # BLD: 0.5 K/UL (ref 0.1–1.3)
MONOCYTES NFR BLD: 7 % (ref 4–12)
NEUTS SEG # BLD: 4.6 K/UL (ref 1.7–8.2)
NEUTS SEG NFR BLD: 69 % (ref 43–78)
NRBC # BLD: 0 K/UL (ref 0–0.2)
PLATELET # BLD AUTO: 230 K/UL (ref 150–450)
PMV BLD AUTO: 9.2 FL (ref 9.4–12.3)
POTASSIUM SERPL-SCNC: 4 MMOL/L (ref 3.5–5.1)
PROT SERPL-MCNC: 6.8 G/DL (ref 6.3–8.2)
RBC # BLD AUTO: 4.02 M/UL (ref 4.05–5.2)
SODIUM SERPL-SCNC: 132 MMOL/L (ref 133–143)
TRIGL SERPL-MCNC: 101 MG/DL (ref 35–150)
TSH, 3RD GENERATION: 1.52 UIU/ML (ref 0.36–3.74)
VLDLC SERPL CALC-MCNC: 20.2 MG/DL (ref 6–23)
WBC # BLD AUTO: 6.7 K/UL (ref 4.3–11.1)

## 2023-02-27 PROCEDURE — G8420 CALC BMI NORM PARAMETERS: HCPCS | Performed by: FAMILY MEDICINE

## 2023-02-27 PROCEDURE — G8484 FLU IMMUNIZE NO ADMIN: HCPCS | Performed by: FAMILY MEDICINE

## 2023-02-27 PROCEDURE — 1090F PRES/ABSN URINE INCON ASSESS: CPT | Performed by: FAMILY MEDICINE

## 2023-02-27 PROCEDURE — 1123F ACP DISCUSS/DSCN MKR DOCD: CPT | Performed by: FAMILY MEDICINE

## 2023-02-27 PROCEDURE — G8427 DOCREV CUR MEDS BY ELIG CLIN: HCPCS | Performed by: FAMILY MEDICINE

## 2023-02-27 PROCEDURE — 1036F TOBACCO NON-USER: CPT | Performed by: FAMILY MEDICINE

## 2023-02-27 SDOH — ECONOMIC STABILITY: FOOD INSECURITY: WITHIN THE PAST 12 MONTHS, YOU WORRIED THAT YOUR FOOD WOULD RUN OUT BEFORE YOU GOT MONEY TO BUY MORE.: NEVER TRUE

## 2023-02-27 SDOH — ECONOMIC STABILITY: HOUSING INSECURITY
IN THE LAST 12 MONTHS, WAS THERE A TIME WHEN YOU DID NOT HAVE A STEADY PLACE TO SLEEP OR SLEPT IN A SHELTER (INCLUDING NOW)?: NO

## 2023-02-27 SDOH — ECONOMIC STABILITY: FOOD INSECURITY: WITHIN THE PAST 12 MONTHS, THE FOOD YOU BOUGHT JUST DIDN'T LAST AND YOU DIDN'T HAVE MONEY TO GET MORE.: NEVER TRUE

## 2023-02-27 SDOH — ECONOMIC STABILITY: INCOME INSECURITY: HOW HARD IS IT FOR YOU TO PAY FOR THE VERY BASICS LIKE FOOD, HOUSING, MEDICAL CARE, AND HEATING?: NOT HARD AT ALL

## 2023-02-27 ASSESSMENT — ENCOUNTER SYMPTOMS: RESPIRATORY NEGATIVE: 1

## 2023-02-27 ASSESSMENT — PATIENT HEALTH QUESTIONNAIRE - PHQ9
SUM OF ALL RESPONSES TO PHQ QUESTIONS 1-9: 5
2. FEELING DOWN, DEPRESSED OR HOPELESS: 2
3. TROUBLE FALLING OR STAYING ASLEEP: 1
SUM OF ALL RESPONSES TO PHQ QUESTIONS 1-9: 5
SUM OF ALL RESPONSES TO PHQ QUESTIONS 1-9: 5
5. POOR APPETITE OR OVEREATING: 0
10. IF YOU CHECKED OFF ANY PROBLEMS, HOW DIFFICULT HAVE THESE PROBLEMS MADE IT FOR YOU TO DO YOUR WORK, TAKE CARE OF THINGS AT HOME, OR GET ALONG WITH OTHER PEOPLE: 0
4. FEELING TIRED OR HAVING LITTLE ENERGY: 0
6. FEELING BAD ABOUT YOURSELF - OR THAT YOU ARE A FAILURE OR HAVE LET YOURSELF OR YOUR FAMILY DOWN: 0
1. LITTLE INTEREST OR PLEASURE IN DOING THINGS: 0
9. THOUGHTS THAT YOU WOULD BE BETTER OFF DEAD, OR OF HURTING YOURSELF: 0
SUM OF ALL RESPONSES TO PHQ QUESTIONS 1-9: 5
7. TROUBLE CONCENTRATING ON THINGS, SUCH AS READING THE NEWSPAPER OR WATCHING TELEVISION: 2
SUM OF ALL RESPONSES TO PHQ9 QUESTIONS 1 & 2: 2
8. MOVING OR SPEAKING SO SLOWLY THAT OTHER PEOPLE COULD HAVE NOTICED. OR THE OPPOSITE, BEING SO FIGETY OR RESTLESS THAT YOU HAVE BEEN MOVING AROUND A LOT MORE THAN USUAL: 0

## 2023-02-27 NOTE — PROGRESS NOTES
PROGRESS NOTE    SUBJECTIVE:   Augustine Mehta is a 80 y.o. female seen for a follow up visit regarding   Chief Complaint   Patient presents with    New Patient     Establish care  Unsteady when walking; has difficulty going up stairs and curbs. Would like home PT        HPI:  Here to establish primary care with us. Patient is doing relatively well. She had a course of physical therapy in the past for gait training and leg strengthening which she thinks helped quite a bit. She would like to do this again as she is feeling somewhat unsteady. She has been dealing with some depression, this is getting better. She lost her  recently. Has significant social support from her Religion. Syn[employer] members with her today. Known CAD, stable, no anginal-like chest pain. Reviewed and updated this visit by provider:  Tobacco  Allergies  Meds  Problems  Med Hx  Surg Hx  Fam Hx           Review of Systems   Respiratory: Negative. Cardiovascular: Negative. OBJECTIVE:  Vitals:    02/27/23 1146   BP: 124/60   Site: Left Upper Arm   Cuff Size: Small Adult   Pulse: 64   SpO2: 98%   Weight: 109 lb (49.4 kg)   Height: 5' 2\" (1.575 m)        Physical Exam   General: Alert and oriented x3, well-appearing  Neck: No adenopathy, thyromegaly or thyroid nodules  Pulmonary: Normal effort, good airflow, no rales or rhonchi  CVS: Regular rate and rhythm, normal S1, S2, no S3 or S4, no murmurs; no carotid bruits, 2+ pedal pulses  Musculoskeletal: Generally weak, no focal motor deficit. Neuro: Alert and oriented, appropriate mentation, level affect, slow gait, uses cane. Medical problems and test results were reviewed with the patient today. No results found for this or any previous visit (from the past 672 hour(s)). No results found for any visits on 02/27/23. ASSESSMENT and PLAN    1. Primary hypertension, well controlled  Orders:  -     Basic Metabolic Panel;  Future  -     CBC with Auto Differential; Future  -     Hepatic Function Panel; Future  -     Lipid Panel; Future  -     TSH; Future  2. Coronary artery disease involving native coronary artery of native heart without angina pectoris, stable with no anginal symptoms  3. Balance disorder, flare, will get home health care physical therapy  -     [email or web address]  4. Atherosclerosis of native coronary artery of native heart without angina pectoris  5. Persistent depressive disorder stable, appropriate grieving, good social support  6. Pure hypercholesterolemia, at goal on current medication      Return in about 3 months (around 5/27/2023).        Kathyanne Cockayne, MD

## 2023-02-28 ENCOUNTER — TELEPHONE (OUTPATIENT)
Dept: FAMILY MEDICINE CLINIC | Facility: CLINIC | Age: 88
End: 2023-02-28

## 2023-02-28 DIAGNOSIS — R29.6 FREQUENT FALLS: Primary | ICD-10-CM

## 2023-02-28 NOTE — TELEPHONE ENCOUNTER
[facility] with [address] stating there is an order for home PT. Reports there are no qualifying diagnosis that will allow this service by Medicare. Reports everything in the note states patient is stable. Reports unstable gait or unsteadiness does not qualify for homecare. Please advise.

## 2023-03-01 NOTE — PROGRESS NOTES
PROGRESS NOTE    SUBJECTIVE:   Kristofer Gloria is a 80 y.o. female seen for a follow up visit regarding   Chief Complaint   Patient presents with    New Patient     Establish care  Unsteady when walking; has difficulty going up stairs and curbs. Would like home PT        HPI:  Patient has had several falls in the past, now feeling unsteady, recent fall without injury. Reviewed and updated this visit by provider:  Tobacco  Allergies  Meds  Problems  Med Hx  Surg Hx  Fam Hx           Review of Systems       OBJECTIVE:  Vitals:    02/27/23 1146   BP: 124/60   Site: Left Upper Arm   Cuff Size: Small Adult   Pulse: 64   SpO2: 98%   Weight: 109 lb (49.4 kg)   Height: 5' 2\" (1.575 m)        Physical Exam     Medical problems and test results were reviewed with the patient today.      Recent Results (from the past 672 hour(s))   TSH    Collection Time: 02/27/23 12:06 PM   Result Value Ref Range    TSH, 3RD GENERATION 1.520 0.358 - 3.740 uIU/mL   Lipid Panel    Collection Time: 02/27/23 12:06 PM   Result Value Ref Range    Cholesterol, Total 185 <200 MG/DL    Triglycerides 101 35 - 150 MG/DL    HDL 73 (H) 40 - 60 MG/DL    LDL Calculated 91.8 <100 MG/DL    VLDL Cholesterol Calculated 20.2 6.0 - 23.0 MG/DL    Chol/HDL Ratio 2.5     Hepatic Function Panel    Collection Time: 02/27/23 12:06 PM   Result Value Ref Range    Total Protein 6.8 6.3 - 8.2 g/dL    Albumin 3.6 3.2 - 4.6 g/dL    Globulin 3.2 2.8 - 4.5 g/dL    Albumin/Globulin Ratio 1.1 0.4 - 1.6      Total Bilirubin 0.6 0.2 - 1.1 MG/DL    Bilirubin, Direct 0.1 <0.4 MG/DL    Alk Phosphatase 78 50 - 136 U/L    AST 19 15 - 37 U/L    ALT 20 12 - 65 U/L   CBC with Auto Differential    Collection Time: 02/27/23 12:06 PM   Result Value Ref Range    WBC 6.7 4.3 - 11.1 K/uL    RBC 4.02 (L) 4.05 - 5.2 M/uL    Hemoglobin 12.4 11.7 - 15.4 g/dL    Hematocrit 37.5 35.8 - 46.3 %    MCV 93.3 82 - 102 FL    MCH 30.8 26.1 - 32.9 PG    MCHC 33.1 31.4 - 35.0 g/dL    RDW 13.5 11.9 - 14.6 %    Platelets 647 082 - 353 K/uL    MPV 9.2 (L) 9.4 - 12.3 FL    nRBC 0.00 0.0 - 0.2 K/uL    Differential Type AUTOMATED      Seg Neutrophils 69 43 - 78 %    Lymphocytes 23 13 - 44 %    Monocytes 7 4.0 - 12.0 %    Eosinophils % 1 0.5 - 7.8 %    Basophils 0 0.0 - 2.0 %    Immature Granulocytes 0 0.0 - 5.0 %    Segs Absolute 4.6 1.7 - 8.2 K/UL    Absolute Lymph # 1.5 0.5 - 4.6 K/UL    Absolute Mono # 0.5 0.1 - 1.3 K/UL    Absolute Eos # 0.1 0.0 - 0.8 K/UL    Basophils Absolute 0.0 0.0 - 0.2 K/UL    Absolute Immature Granulocyte 0.0 0.0 - 0.5 K/UL   Basic Metabolic Panel    Collection Time: 02/27/23 12:06 PM   Result Value Ref Range    Sodium 132 (L) 133 - 143 mmol/L    Potassium 4.0 3.5 - 5.1 mmol/L    Chloride 98 (L) 101 - 110 mmol/L    CO2 28 21 - 32 mmol/L    Anion Gap 6 2 - 11 mmol/L    Glucose 90 65 - 100 mg/dL    BUN 13 8 - 23 MG/DL    Creatinine 0.70 0.6 - 1.0 MG/DL    Est, Glom Filt Rate >60 >60 ml/min/1.73m2    Calcium 9.0 8.3 - 10.4 MG/DL       Results for orders placed or performed in visit on 02/27/23   TSH   Result Value Ref Range    TSH, 3RD GENERATION 1.520 0.358 - 3.740 uIU/mL   Lipid Panel   Result Value Ref Range    Cholesterol, Total 185 <200 MG/DL    Triglycerides 101 35 - 150 MG/DL    HDL 73 (H) 40 - 60 MG/DL    LDL Calculated 91.8 <100 MG/DL    VLDL Cholesterol Calculated 20.2 6.0 - 23.0 MG/DL    Chol/HDL Ratio 2.5     Hepatic Function Panel   Result Value Ref Range    Total Protein 6.8 6.3 - 8.2 g/dL    Albumin 3.6 3.2 - 4.6 g/dL    Globulin 3.2 2.8 - 4.5 g/dL    Albumin/Globulin Ratio 1.1 0.4 - 1.6      Total Bilirubin 0.6 0.2 - 1.1 MG/DL    Bilirubin, Direct 0.1 <0.4 MG/DL    Alk Phosphatase 78 50 - 136 U/L    AST 19 15 - 37 U/L    ALT 20 12 - 65 U/L   CBC with Auto Differential   Result Value Ref Range    WBC 6.7 4.3 - 11.1 K/uL    RBC 4.02 (L) 4.05 - 5.2 M/uL    Hemoglobin 12.4 11.7 - 15.4 g/dL    Hematocrit 37.5 35.8 - 46.3 %    MCV 93.3 82 - 102 FL    MCH 30.8 26.1 - 32.9 PG MCHC 33.1 31.4 - 35.0 g/dL    RDW 13.5 11.9 - 14.6 %    Platelets 573 172 - 098 K/uL    MPV 9.2 (L) 9.4 - 12.3 FL    nRBC 0.00 0.0 - 0.2 K/uL    Differential Type AUTOMATED      Seg Neutrophils 69 43 - 78 %    Lymphocytes 23 13 - 44 %    Monocytes 7 4.0 - 12.0 %    Eosinophils % 1 0.5 - 7.8 %    Basophils 0 0.0 - 2.0 %    Immature Granulocytes 0 0.0 - 5.0 %    Segs Absolute 4.6 1.7 - 8.2 K/UL    Absolute Lymph # 1.5 0.5 - 4.6 K/UL    Absolute Mono # 0.5 0.1 - 1.3 K/UL    Absolute Eos # 0.1 0.0 - 0.8 K/UL    Basophils Absolute 0.0 0.0 - 0.2 K/UL    Absolute Immature Granulocyte 0.0 0.0 - 0.5 K/UL   Basic Metabolic Panel   Result Value Ref Range    Sodium 132 (L) 133 - 143 mmol/L    Potassium 4.0 3.5 - 5.1 mmol/L    Chloride 98 (L) 101 - 110 mmol/L    CO2 28 21 - 32 mmol/L    Anion Gap 6 2 - 11 mmol/L    Glucose 90 65 - 100 mg/dL    BUN 13 8 - 23 MG/DL    Creatinine 0.70 0.6 - 1.0 MG/DL    Est, Glom Filt Rate >60 >60 ml/min/1.73m2    Calcium 9.0 8.3 - 10.4 MG/DL        ASSESSMENT and PLAN    1. Primary hypertension  Overview:  Last Assessment & Plan:   Formatting of this note might be different from the original.  Patient's cardiologist made note at her last office visit that they were allowing for permissive hypertension. Patient's blood pressure was elevated today in the office. I discussed with the patient that if her blood pressure remains over 160/80-90, she is to take 1 whole tablet of her hypertensive medication. Right now she is only taking half a tablet. Patient is to continue to keep a BP log. Patient voiced understanding and compliant with plan. Orders:  -     Basic Metabolic Panel; Future  -     CBC with Auto Differential; Future  -     Hepatic Function Panel; Future  -     Lipid Panel; Future  -     TSH; Future  2. Coronary artery disease involving native coronary artery of native heart without angina pectoris  3. Balance disorder  -     [facility]  4.  Atherosclerosis of native coronary artery of native heart without angina pectoris  5. Persistent depressive disorder  Overview:  Last Assessment & Plan:   Formatting of this note might be different from the original.  Patient is to continue Lexapro for depression and anxiety symptoms. We thoroughly discussed reducing life stressors and making sure that patient is taking care of herself. We thoroughly discussed the burden of being the primary caregiver of her . Due to her husbands rapid decline physically and cognitively, I strongly encouraged patient to consider full-time skilled nursing, hospice, or placing her  in assisted living. She is interested in exploring these options further. Her niece was also in agreement with exploring these options further. 6. Pure hypercholesterolemia         Return in about 3 months (around 5/27/2023).        Yosvany Mendez MD

## 2023-03-03 NOTE — TELEPHONE ENCOUNTER
[facility] with [address] stating he seen that we put a new order in for patient for PT. States he seen the addendum notes that were made. Reports provider not documenting root cause of falls; reports balance disorder is a non-qualifying diagnosis for Medicare to pay. Reports needing documentation of what is causing these falls. If we know what is causing these falls, it needs to be documented in the notes. Please advise.

## 2023-03-04 NOTE — PROGRESS NOTES
PROGRESS NOTE    SUBJECTIVE:   Karen Hooker is a 80 y.o. female seen for a follow up visit regarding   Chief Complaint   Patient presents with    New Patient     Establish care  Unsteady when walking; has difficulty going up stairs and curbs. Would like home PT        HPI:  HPI     Reviewed and updated this visit by provider:  Tobacco  Allergies  Meds  Problems  Med Hx  Surg Hx  Fam Hx           Review of Systems       OBJECTIVE:  Vitals:    02/27/23 1146   BP: 124/60   Site: Left Upper Arm   Cuff Size: Small Adult   Pulse: 64   SpO2: 98%   Weight: 109 lb (49.4 kg)   Height: 5' 2\" (1.575 m)        Physical Exam     Medical problems and test results were reviewed with the patient today.      Recent Results (from the past 672 hour(s))   TSH    Collection Time: 02/27/23 12:06 PM   Result Value Ref Range    TSH, 3RD GENERATION 1.520 0.358 - 3.740 uIU/mL   Lipid Panel    Collection Time: 02/27/23 12:06 PM   Result Value Ref Range    Cholesterol, Total 185 <200 MG/DL    Triglycerides 101 35 - 150 MG/DL    HDL 73 (H) 40 - 60 MG/DL    LDL Calculated 91.8 <100 MG/DL    VLDL Cholesterol Calculated 20.2 6.0 - 23.0 MG/DL    Chol/HDL Ratio 2.5     Hepatic Function Panel    Collection Time: 02/27/23 12:06 PM   Result Value Ref Range    Total Protein 6.8 6.3 - 8.2 g/dL    Albumin 3.6 3.2 - 4.6 g/dL    Globulin 3.2 2.8 - 4.5 g/dL    Albumin/Globulin Ratio 1.1 0.4 - 1.6      Total Bilirubin 0.6 0.2 - 1.1 MG/DL    Bilirubin, Direct 0.1 <0.4 MG/DL    Alk Phosphatase 78 50 - 136 U/L    AST 19 15 - 37 U/L    ALT 20 12 - 65 U/L   CBC with Auto Differential    Collection Time: 02/27/23 12:06 PM   Result Value Ref Range    WBC 6.7 4.3 - 11.1 K/uL    RBC 4.02 (L) 4.05 - 5.2 M/uL    Hemoglobin 12.4 11.7 - 15.4 g/dL    Hematocrit 37.5 35.8 - 46.3 %    MCV 93.3 82 - 102 FL    MCH 30.8 26.1 - 32.9 PG    MCHC 33.1 31.4 - 35.0 g/dL    RDW 13.5 11.9 - 14.6 %    Platelets 211 611 - 833 K/uL    MPV 9.2 (L) 9.4 - 12.3 FL    nRBC 0.00 0.0 - 0.2 K/uL    Differential Type AUTOMATED      Seg Neutrophils 69 43 - 78 %    Lymphocytes 23 13 - 44 %    Monocytes 7 4.0 - 12.0 %    Eosinophils % 1 0.5 - 7.8 %    Basophils 0 0.0 - 2.0 %    Immature Granulocytes 0 0.0 - 5.0 %    Segs Absolute 4.6 1.7 - 8.2 K/UL    Absolute Lymph # 1.5 0.5 - 4.6 K/UL    Absolute Mono # 0.5 0.1 - 1.3 K/UL    Absolute Eos # 0.1 0.0 - 0.8 K/UL    Basophils Absolute 0.0 0.0 - 0.2 K/UL    Absolute Immature Granulocyte 0.0 0.0 - 0.5 K/UL   Basic Metabolic Panel    Collection Time: 02/27/23 12:06 PM   Result Value Ref Range    Sodium 132 (L) 133 - 143 mmol/L    Potassium 4.0 3.5 - 5.1 mmol/L    Chloride 98 (L) 101 - 110 mmol/L    CO2 28 21 - 32 mmol/L    Anion Gap 6 2 - 11 mmol/L    Glucose 90 65 - 100 mg/dL    BUN 13 8 - 23 MG/DL    Creatinine 0.70 0.6 - 1.0 MG/DL    Est, Glom Filt Rate >60 >60 ml/min/1.73m2    Calcium 9.0 8.3 - 10.4 MG/DL       Results for orders placed or performed in visit on 02/27/23   TSH   Result Value Ref Range    TSH, 3RD GENERATION 1.520 0.358 - 3.740 uIU/mL   Lipid Panel   Result Value Ref Range    Cholesterol, Total 185 <200 MG/DL    Triglycerides 101 35 - 150 MG/DL    HDL 73 (H) 40 - 60 MG/DL    LDL Calculated 91.8 <100 MG/DL    VLDL Cholesterol Calculated 20.2 6.0 - 23.0 MG/DL    Chol/HDL Ratio 2.5     Hepatic Function Panel   Result Value Ref Range    Total Protein 6.8 6.3 - 8.2 g/dL    Albumin 3.6 3.2 - 4.6 g/dL    Globulin 3.2 2.8 - 4.5 g/dL    Albumin/Globulin Ratio 1.1 0.4 - 1.6      Total Bilirubin 0.6 0.2 - 1.1 MG/DL    Bilirubin, Direct 0.1 <0.4 MG/DL    Alk Phosphatase 78 50 - 136 U/L    AST 19 15 - 37 U/L    ALT 20 12 - 65 U/L   CBC with Auto Differential   Result Value Ref Range    WBC 6.7 4.3 - 11.1 K/uL    RBC 4.02 (L) 4.05 - 5.2 M/uL    Hemoglobin 12.4 11.7 - 15.4 g/dL    Hematocrit 37.5 35.8 - 46.3 %    MCV 93.3 82 - 102 FL    MCH 30.8 26.1 - 32.9 PG    MCHC 33.1 31.4 - 35.0 g/dL    RDW 13.5 11.9 - 14.6 %    Platelets 742 195 - 339 K/uL    MPV 9.2 (L) 9.4 - 12.3 FL    nRBC 0.00 0.0 - 0.2 K/uL    Differential Type AUTOMATED      Seg Neutrophils 69 43 - 78 %    Lymphocytes 23 13 - 44 %    Monocytes 7 4.0 - 12.0 %    Eosinophils % 1 0.5 - 7.8 %    Basophils 0 0.0 - 2.0 %    Immature Granulocytes 0 0.0 - 5.0 %    Segs Absolute 4.6 1.7 - 8.2 K/UL    Absolute Lymph # 1.5 0.5 - 4.6 K/UL    Absolute Mono # 0.5 0.1 - 1.3 K/UL    Absolute Eos # 0.1 0.0 - 0.8 K/UL    Basophils Absolute 0.0 0.0 - 0.2 K/UL    Absolute Immature Granulocyte 0.0 0.0 - 0.5 K/UL   Basic Metabolic Panel   Result Value Ref Range    Sodium 132 (L) 133 - 143 mmol/L    Potassium 4.0 3.5 - 5.1 mmol/L    Chloride 98 (L) 101 - 110 mmol/L    CO2 28 21 - 32 mmol/L    Anion Gap 6 2 - 11 mmol/L    Glucose 90 65 - 100 mg/dL    BUN 13 8 - 23 MG/DL    Creatinine 0.70 0.6 - 1.0 MG/DL    Est, Glom Filt Rate >60 >60 ml/min/1.73m2    Calcium 9.0 8.3 - 10.4 MG/DL        ASSESSMENT and PLAN    1. Primary hypertension  Overview:  Last Assessment & Plan:   Formatting of this note might be different from the original.  Patient's cardiologist made note at her last office visit that they were allowing for permissive hypertension. Patient's blood pressure was elevated today in the office. I discussed with the patient that if her blood pressure remains over 160/80-90, she is to take 1 whole tablet of her hypertensive medication. Right now she is only taking half a tablet. Patient is to continue to keep a BP log. Patient voiced understanding and compliant with plan. Orders:  -     Basic Metabolic Panel; Future  -     CBC with Auto Differential; Future  -     Hepatic Function Panel; Future  -     Lipid Panel; Future  -     TSH; Future  2. Coronary artery disease involving native coronary artery of native heart without angina pectoris  3. Balance disorder  -     [facility]  4. Atherosclerosis of native coronary artery of native heart without angina pectoris  5.  Persistent depressive disorder  Overview:  Last Assessment & Plan:   Formatting of this note might be different from the original.  Patient is to continue Lexapro for depression and anxiety symptoms. We thoroughly discussed reducing life stressors and making sure that patient is taking care of herself. We thoroughly discussed the burden of being the primary caregiver of her . Due to her husbands rapid decline physically and cognitively, I strongly encouraged patient to consider full-time skilled nursing, hospice, or placing her  in assisted living. She is interested in exploring these options further. Her niece was also in agreement with exploring these options further. 6. Pure hypercholesterolemia  7. Frequent falls, likely multifactorial due to frailty, 80year-old musculoskeletal system, 80year-old peripheral nervous system with decreased proprioception.    -   Patient will benefit from physical therapy efforts to improve strength and balance         Return in about 3 months (around 5/27/2023).        Maddie Sears MD

## 2023-03-07 ENCOUNTER — HOME CARE VISIT (OUTPATIENT)
Dept: SCHEDULING | Facility: HOME HEALTH | Age: 88
End: 2023-03-07

## 2023-03-07 VITALS
OXYGEN SATURATION: 96 % | SYSTOLIC BLOOD PRESSURE: 126 MMHG | DIASTOLIC BLOOD PRESSURE: 62 MMHG | TEMPERATURE: 98.2 F | HEART RATE: 72 BPM | RESPIRATION RATE: 14 BRPM

## 2023-03-07 PROCEDURE — G0151 HHCP-SERV OF PT,EA 15 MIN: HCPCS

## 2023-03-07 PROCEDURE — 0221000100 HH NO PAY CLAIM PROCEDURE

## 2023-03-07 ASSESSMENT — ENCOUNTER SYMPTOMS
DYSPNEA ACTIVITY LEVEL: AFTER AMBULATING 10 - 20 FT
PAIN LOCATION - PAIN QUALITY: ACHES, SORE

## 2023-03-07 NOTE — Clinical Note
Medication review revealed potential major interaction between tramadol and escitalopram. Please advise if any changes are to be made.

## 2023-03-07 NOTE — HOME HEALTH
Situation/Background: 80year old female patient with PMH of arrhythmia, arthritis, skin cancer, chronic pain, dyspnea, GERD, hyperlipidemia, HTN, palpitations, supraventricular beat. Lives alone in single level home with several steps to enter. Patient referred to HHPT from PCP to address debility and impaired balance.    Assessment:   Recommendation:

## 2023-03-08 ENCOUNTER — TELEPHONE (OUTPATIENT)
Dept: FAMILY MEDICINE CLINIC | Facility: CLINIC | Age: 88
End: 2023-03-08

## 2023-03-09 ENCOUNTER — TELEPHONE (OUTPATIENT)
Dept: FAMILY MEDICINE CLINIC | Facility: CLINIC | Age: 88
End: 2023-03-09

## 2023-03-09 NOTE — TELEPHONE ENCOUNTER
[facility] called stating patient has established care with [facility]. Reports they were there to see her on 3/7/23.  They will be closing referral.

## 2023-03-10 ENCOUNTER — HOME CARE VISIT (OUTPATIENT)
Dept: SCHEDULING | Facility: HOME HEALTH | Age: 88
End: 2023-03-10

## 2023-03-10 VITALS
DIASTOLIC BLOOD PRESSURE: 72 MMHG | OXYGEN SATURATION: 17 % | SYSTOLIC BLOOD PRESSURE: 130 MMHG | TEMPERATURE: 98 F | RESPIRATION RATE: 17 BRPM | HEART RATE: 64 BPM

## 2023-03-10 PROCEDURE — G0157 HHC PT ASSISTANT EA 15: HCPCS

## 2023-03-10 ASSESSMENT — ENCOUNTER SYMPTOMS: PAIN LOCATION - PAIN QUALITY: ACHING, SORE

## 2023-03-14 ENCOUNTER — HOME CARE VISIT (OUTPATIENT)
Dept: SCHEDULING | Facility: HOME HEALTH | Age: 88
End: 2023-03-14
Payer: MEDICARE

## 2023-03-14 VITALS
HEART RATE: 66 BPM | DIASTOLIC BLOOD PRESSURE: 70 MMHG | TEMPERATURE: 98.6 F | OXYGEN SATURATION: 97 % | SYSTOLIC BLOOD PRESSURE: 138 MMHG | RESPIRATION RATE: 17 BRPM

## 2023-03-14 PROCEDURE — G0157 HHC PT ASSISTANT EA 15: HCPCS

## 2023-03-14 ASSESSMENT — ENCOUNTER SYMPTOMS: PAIN LOCATION - PAIN QUALITY: ACHING

## 2023-03-17 ENCOUNTER — HOME CARE VISIT (OUTPATIENT)
Dept: SCHEDULING | Facility: HOME HEALTH | Age: 88
End: 2023-03-17
Payer: MEDICARE

## 2023-03-17 VITALS
TEMPERATURE: 97.7 F | OXYGEN SATURATION: 97 % | DIASTOLIC BLOOD PRESSURE: 64 MMHG | SYSTOLIC BLOOD PRESSURE: 112 MMHG | RESPIRATION RATE: 17 BRPM | HEART RATE: 68 BPM

## 2023-03-17 PROCEDURE — G0157 HHC PT ASSISTANT EA 15: HCPCS

## 2023-03-20 ENCOUNTER — TELEPHONE (OUTPATIENT)
Dept: CARDIOLOGY CLINIC | Age: 88
End: 2023-03-20

## 2023-03-20 NOTE — TELEPHONE ENCOUNTER
----- Message from Sonal Jones DO sent at 3/17/2023  8:39 AM EDT -----  Please call the patient. ECHO was ok, nothing major or concerning. If having more angina (worsening CARCAMO, CP, SOB), please let us know. Will review more at follow up appointment and get plan for the future.     Thanks,   Pepe Serra

## 2023-03-21 ENCOUNTER — HOME CARE VISIT (OUTPATIENT)
Dept: SCHEDULING | Facility: HOME HEALTH | Age: 88
End: 2023-03-21
Payer: MEDICARE

## 2023-03-21 VITALS
HEART RATE: 78 BPM | DIASTOLIC BLOOD PRESSURE: 62 MMHG | OXYGEN SATURATION: 97 % | RESPIRATION RATE: 17 BRPM | SYSTOLIC BLOOD PRESSURE: 110 MMHG | TEMPERATURE: 98 F

## 2023-03-21 PROCEDURE — G0157 HHC PT ASSISTANT EA 15: HCPCS

## 2023-03-21 ASSESSMENT — ENCOUNTER SYMPTOMS: PAIN LOCATION - PAIN QUALITY: ACHING, SORE

## 2023-03-23 ENCOUNTER — OFFICE VISIT (OUTPATIENT)
Dept: FAMILY MEDICINE CLINIC | Facility: CLINIC | Age: 88
End: 2023-03-23
Payer: MEDICARE

## 2023-03-23 VITALS
DIASTOLIC BLOOD PRESSURE: 70 MMHG | SYSTOLIC BLOOD PRESSURE: 124 MMHG | HEART RATE: 78 BPM | HEIGHT: 62 IN | BODY MASS INDEX: 19.91 KG/M2 | OXYGEN SATURATION: 98 % | WEIGHT: 108.2 LBS

## 2023-03-23 DIAGNOSIS — E78.00 PURE HYPERCHOLESTEROLEMIA: ICD-10-CM

## 2023-03-23 DIAGNOSIS — R53.1 WEAKNESS: Primary | ICD-10-CM

## 2023-03-23 DIAGNOSIS — I10 PRIMARY HYPERTENSION: ICD-10-CM

## 2023-03-23 PROCEDURE — 1123F ACP DISCUSS/DSCN MKR DOCD: CPT | Performed by: FAMILY MEDICINE

## 2023-03-23 PROCEDURE — G8427 DOCREV CUR MEDS BY ELIG CLIN: HCPCS | Performed by: FAMILY MEDICINE

## 2023-03-23 PROCEDURE — G8420 CALC BMI NORM PARAMETERS: HCPCS | Performed by: FAMILY MEDICINE

## 2023-03-23 PROCEDURE — 99214 OFFICE O/P EST MOD 30 MIN: CPT | Performed by: FAMILY MEDICINE

## 2023-03-23 PROCEDURE — 1090F PRES/ABSN URINE INCON ASSESS: CPT | Performed by: FAMILY MEDICINE

## 2023-03-23 PROCEDURE — G8484 FLU IMMUNIZE NO ADMIN: HCPCS | Performed by: FAMILY MEDICINE

## 2023-03-23 PROCEDURE — 1036F TOBACCO NON-USER: CPT | Performed by: FAMILY MEDICINE

## 2023-03-23 RX ORDER — VIT C/B6/B5/MAGNESIUM/HERB 173 50-5-6-5MG
CAPSULE ORAL DAILY
COMMUNITY

## 2023-03-23 RX ORDER — HYDROCHLOROTHIAZIDE 12.5 MG/1
12.5 CAPSULE, GELATIN COATED ORAL EVERY MORNING
Qty: 90 CAPSULE | Refills: 1 | Status: SHIPPED | OUTPATIENT
Start: 2023-03-23 | End: 2023-09-19

## 2023-03-23 RX ORDER — CHLORAL HYDRATE 500 MG
CAPSULE ORAL DAILY
COMMUNITY

## 2023-03-23 RX ORDER — LOPERAMIDE HYDROCHLORIDE 2 MG/1
2 CAPSULE ORAL 4 TIMES DAILY PRN
COMMUNITY

## 2023-03-23 ASSESSMENT — PATIENT HEALTH QUESTIONNAIRE - PHQ9
SUM OF ALL RESPONSES TO PHQ QUESTIONS 1-9: 0
SUM OF ALL RESPONSES TO PHQ QUESTIONS 1-9: 0
2. FEELING DOWN, DEPRESSED OR HOPELESS: 0
7. TROUBLE CONCENTRATING ON THINGS, SUCH AS READING THE NEWSPAPER OR WATCHING TELEVISION: 0
SUM OF ALL RESPONSES TO PHQ9 QUESTIONS 1 & 2: 0
10. IF YOU CHECKED OFF ANY PROBLEMS, HOW DIFFICULT HAVE THESE PROBLEMS MADE IT FOR YOU TO DO YOUR WORK, TAKE CARE OF THINGS AT HOME, OR GET ALONG WITH OTHER PEOPLE: 0
8. MOVING OR SPEAKING SO SLOWLY THAT OTHER PEOPLE COULD HAVE NOTICED. OR THE OPPOSITE, BEING SO FIGETY OR RESTLESS THAT YOU HAVE BEEN MOVING AROUND A LOT MORE THAN USUAL: 0
SUM OF ALL RESPONSES TO PHQ QUESTIONS 1-9: 0
6. FEELING BAD ABOUT YOURSELF - OR THAT YOU ARE A FAILURE OR HAVE LET YOURSELF OR YOUR FAMILY DOWN: 0
5. POOR APPETITE OR OVEREATING: 0
4. FEELING TIRED OR HAVING LITTLE ENERGY: 0
1. LITTLE INTEREST OR PLEASURE IN DOING THINGS: 0
3. TROUBLE FALLING OR STAYING ASLEEP: 0
9. THOUGHTS THAT YOU WOULD BE BETTER OFF DEAD, OR OF HURTING YOURSELF: 0
SUM OF ALL RESPONSES TO PHQ QUESTIONS 1-9: 0

## 2023-03-23 ASSESSMENT — ENCOUNTER SYMPTOMS: RESPIRATORY NEGATIVE: 1

## 2023-03-23 NOTE — PROGRESS NOTES
PROGRESS NOTE    SUBJECTIVE:   Dotty Ramirez is a 80 y.o. female seen for a follow up visit regarding   Chief Complaint   Patient presents with    Results     Here to discuss results of Echo on 3/16/23; has follow up appointment with Cardiology on 4/12/23  Would also like to discuss recent lab results. Other     Would like to go over medications to insure she is taking everything correctly and if she needs to take all of it. Having some swelling in left leg        HPI:  Here for follow-up of chronic problems. She reports she is doing relatively well but overall feels weak. She continues to deal with abdominal symptoms, cramping, intermittent diarrhea, seems to be mostly after meals. She has had a cholecystectomy and thinks it may be a temporal association with the diarrhea. She had taken WelChol in the past, still has her bowel but not sure if she is still taking this, as labeled 1 pill 3 times a day. Blood pressure here today is 124/70, she reports that at home it is frequently under 120. Reviewed and updated this visit by provider:  Tobacco  Allergies  Meds  Problems  Med Hx  Surg Hx  Fam Hx           Review of Systems   Respiratory: Negative. Cardiovascular: Negative. OBJECTIVE:  Vitals:    03/23/23 1024   BP: 124/70   Site: Left Upper Arm   Cuff Size: Medium Adult   Pulse: 78   SpO2: 98%   Weight: 108 lb 3.2 oz (49.1 kg)   Height: 5' 2\" (1.575 m)        Physical Exam   General: Alert and oriented x3, cognition remarkably intact for her age  Neck: No adenopathy, thyromegaly or thyroid nodules  Pulmonary: Normal effort, good airflow, no rales or rhonchi  CVS: Regular rate and rhythm, normal S1, S2, no S3 or S4, no murmurs; no carotid bruits, 2+ pedal pulses  GI: Normal bowel sounds, soft, nontender, no mass    Medical problems and test results were reviewed with the patient today.      Recent Results (from the past 672 hour(s))   TSH    Collection Time: 02/27/23 12:06 PM

## 2023-03-24 ENCOUNTER — HOME CARE VISIT (OUTPATIENT)
Dept: SCHEDULING | Facility: HOME HEALTH | Age: 88
End: 2023-03-24
Payer: MEDICARE

## 2023-03-24 VITALS
OXYGEN SATURATION: 97 % | RESPIRATION RATE: 17 BRPM | DIASTOLIC BLOOD PRESSURE: 66 MMHG | HEART RATE: 64 BPM | SYSTOLIC BLOOD PRESSURE: 122 MMHG | TEMPERATURE: 98 F

## 2023-03-24 PROCEDURE — G0157 HHC PT ASSISTANT EA 15: HCPCS

## 2023-03-27 ENCOUNTER — HOME CARE VISIT (OUTPATIENT)
Dept: SCHEDULING | Facility: HOME HEALTH | Age: 88
End: 2023-03-27
Payer: MEDICARE

## 2023-03-27 VITALS
SYSTOLIC BLOOD PRESSURE: 118 MMHG | OXYGEN SATURATION: 97 % | RESPIRATION RATE: 14 BRPM | DIASTOLIC BLOOD PRESSURE: 72 MMHG | HEART RATE: 62 BPM | TEMPERATURE: 97.9 F

## 2023-03-27 PROCEDURE — G0151 HHCP-SERV OF PT,EA 15 MIN: HCPCS

## 2023-03-27 ASSESSMENT — ENCOUNTER SYMPTOMS
PAIN LOCATION - PAIN QUALITY: ACHES, SORE
DYSPNEA ACTIVITY LEVEL: AFTER AMBULATING MORE THAN 20 FT

## 2023-04-06 ENCOUNTER — OFFICE VISIT (OUTPATIENT)
Dept: FAMILY MEDICINE CLINIC | Facility: CLINIC | Age: 88
End: 2023-04-06
Payer: MEDICARE

## 2023-04-06 ENCOUNTER — TELEPHONE (OUTPATIENT)
Dept: FAMILY MEDICINE CLINIC | Facility: CLINIC | Age: 88
End: 2023-04-06

## 2023-04-06 VITALS
BODY MASS INDEX: 19.58 KG/M2 | HEART RATE: 72 BPM | SYSTOLIC BLOOD PRESSURE: 132 MMHG | HEIGHT: 62 IN | DIASTOLIC BLOOD PRESSURE: 68 MMHG | WEIGHT: 106.4 LBS | OXYGEN SATURATION: 99 %

## 2023-04-06 DIAGNOSIS — I10 PRIMARY HYPERTENSION: Primary | ICD-10-CM

## 2023-04-06 DIAGNOSIS — F34.1 PERSISTENT DEPRESSIVE DISORDER: ICD-10-CM

## 2023-04-06 DIAGNOSIS — I25.10 CORONARY ARTERY DISEASE INVOLVING NATIVE CORONARY ARTERY OF NATIVE HEART WITHOUT ANGINA PECTORIS: ICD-10-CM

## 2023-04-06 PROCEDURE — G8420 CALC BMI NORM PARAMETERS: HCPCS | Performed by: FAMILY MEDICINE

## 2023-04-06 PROCEDURE — G8427 DOCREV CUR MEDS BY ELIG CLIN: HCPCS | Performed by: FAMILY MEDICINE

## 2023-04-06 PROCEDURE — 1036F TOBACCO NON-USER: CPT | Performed by: FAMILY MEDICINE

## 2023-04-06 PROCEDURE — 1123F ACP DISCUSS/DSCN MKR DOCD: CPT | Performed by: FAMILY MEDICINE

## 2023-04-06 PROCEDURE — 1090F PRES/ABSN URINE INCON ASSESS: CPT | Performed by: FAMILY MEDICINE

## 2023-04-06 PROCEDURE — 99213 OFFICE O/P EST LOW 20 MIN: CPT | Performed by: FAMILY MEDICINE

## 2023-04-06 ASSESSMENT — PATIENT HEALTH QUESTIONNAIRE - PHQ9
8. MOVING OR SPEAKING SO SLOWLY THAT OTHER PEOPLE COULD HAVE NOTICED. OR THE OPPOSITE, BEING SO FIGETY OR RESTLESS THAT YOU HAVE BEEN MOVING AROUND A LOT MORE THAN USUAL: 0
SUM OF ALL RESPONSES TO PHQ QUESTIONS 1-9: 5
10. IF YOU CHECKED OFF ANY PROBLEMS, HOW DIFFICULT HAVE THESE PROBLEMS MADE IT FOR YOU TO DO YOUR WORK, TAKE CARE OF THINGS AT HOME, OR GET ALONG WITH OTHER PEOPLE: 1
2. FEELING DOWN, DEPRESSED OR HOPELESS: 2
9. THOUGHTS THAT YOU WOULD BE BETTER OFF DEAD, OR OF HURTING YOURSELF: 0
SUM OF ALL RESPONSES TO PHQ9 QUESTIONS 1 & 2: 2
6. FEELING BAD ABOUT YOURSELF - OR THAT YOU ARE A FAILURE OR HAVE LET YOURSELF OR YOUR FAMILY DOWN: 0
4. FEELING TIRED OR HAVING LITTLE ENERGY: 0
SUM OF ALL RESPONSES TO PHQ QUESTIONS 1-9: 5
3. TROUBLE FALLING OR STAYING ASLEEP: 0
7. TROUBLE CONCENTRATING ON THINGS, SUCH AS READING THE NEWSPAPER OR WATCHING TELEVISION: 0
1. LITTLE INTEREST OR PLEASURE IN DOING THINGS: 0
SUM OF ALL RESPONSES TO PHQ QUESTIONS 1-9: 5
SUM OF ALL RESPONSES TO PHQ QUESTIONS 1-9: 5
5. POOR APPETITE OR OVEREATING: 3

## 2023-04-06 ASSESSMENT — ENCOUNTER SYMPTOMS: RESPIRATORY NEGATIVE: 1

## 2023-04-06 NOTE — TELEPHONE ENCOUNTER
Form completed by provider. LVM letting patient know form was ready for her to . Advised if she would like it mailed to her to call back and would could do that.

## 2023-04-06 NOTE — PROGRESS NOTES
PROGRESS NOTE    SUBJECTIVE:   Napoleonl Harley is a 80 y.o. female seen for a follow up visit regarding   Chief Complaint   Patient presents with    Nausea     Complains of nausea when trying to eat, loss of appetite and headache since over the weekend. Denies any other symptoms. Hard for her to drink Ensure due to making her feel nauseated         HPI:  Here for follow-up. She is recently seen with some GI symptoms. 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 Blood pressures have been running very low, we made some changes to her blood pressure regimen at her last visit and her blood pressures are much more appropriate today. Still with chronic GI symptoms. She continues to grieve, her   5 months ago. Reviewed and updated this visit by provider:  Tobacco  Allergies  Meds  Problems  Med Hx  Surg Hx  Fam Hx           Review of Systems   Respiratory: Negative. Cardiovascular: Negative. OBJECTIVE:  Vitals:    23 0918   BP: 132/68   Site: Left Upper Arm   Cuff Size: Small Adult   Pulse: 72   SpO2: 99%   Weight: 106 lb 6.4 oz (48.3 kg)   Height: 5' 2\" (1.575 m)        Physical Exam   General: Alert tearful at times, oriented x3, does not appear ill  Neck: No adenopathy, thyromegaly or thyroid nodules  Pulmonary: Normal effort, good airflow, no rales or rhonchi  CVS: Regular rate and rhythm, normal S1, S2, no S3 or S4, no murmurs; no carotid bruits, 2+ pedal pulses      Medical problems and test results were reviewed with the patient today.      Recent Results (from the past 672 hour(s))   Transthoracic echocardiogram (TTE) complete with contrast, bubble, strain, and 3D PRN    Collection Time: 23 12:21 PM   Result Value Ref Range    IVSd 0.8 0.6 - 0.9 cm    LVIDd 3.6 (A) 3.9 - 5.3 cm    LVIDs 2.1 cm    LVPWd 0.9 0.6 - 0.9 cm    EF BP 60 55 - 100 %    LV Ejection Fraction A2C 65 %    LV Ejection

## 2023-04-06 NOTE — TELEPHONE ENCOUNTER
Patient advised forgot to ask at appt for a handicap placard. Phone # to be reached at [phone] or [phone].  Please advise

## 2023-04-19 RX ORDER — ESCITALOPRAM OXALATE 20 MG/1
20 TABLET ORAL DAILY
Qty: 90 TABLET | Refills: 1 | Status: SHIPPED | OUTPATIENT
Start: 2023-04-19 | End: 2023-10-16

## 2023-05-16 ENCOUNTER — OFFICE VISIT (OUTPATIENT)
Dept: FAMILY MEDICINE CLINIC | Facility: CLINIC | Age: 88
End: 2023-05-16
Payer: MEDICARE

## 2023-05-16 VITALS
BODY MASS INDEX: 19.98 KG/M2 | HEIGHT: 62 IN | OXYGEN SATURATION: 96 % | SYSTOLIC BLOOD PRESSURE: 108 MMHG | WEIGHT: 108.6 LBS | DIASTOLIC BLOOD PRESSURE: 68 MMHG | HEART RATE: 64 BPM

## 2023-05-16 DIAGNOSIS — I10 PRIMARY HYPERTENSION: ICD-10-CM

## 2023-05-16 DIAGNOSIS — I25.10 ATHEROSCLEROSIS OF NATIVE CORONARY ARTERY OF NATIVE HEART WITHOUT ANGINA PECTORIS: ICD-10-CM

## 2023-05-16 DIAGNOSIS — M79.2 NEUROPATHIC PAIN: Primary | ICD-10-CM

## 2023-05-16 DIAGNOSIS — N32.81 OVERACTIVE BLADDER: ICD-10-CM

## 2023-05-16 LAB — VIT B12 SERPL-MCNC: 355 PG/ML (ref 193–986)

## 2023-05-16 PROCEDURE — 1036F TOBACCO NON-USER: CPT | Performed by: FAMILY MEDICINE

## 2023-05-16 PROCEDURE — G8420 CALC BMI NORM PARAMETERS: HCPCS | Performed by: FAMILY MEDICINE

## 2023-05-16 PROCEDURE — G8427 DOCREV CUR MEDS BY ELIG CLIN: HCPCS | Performed by: FAMILY MEDICINE

## 2023-05-16 PROCEDURE — 1090F PRES/ABSN URINE INCON ASSESS: CPT | Performed by: FAMILY MEDICINE

## 2023-05-16 PROCEDURE — 1123F ACP DISCUSS/DSCN MKR DOCD: CPT | Performed by: FAMILY MEDICINE

## 2023-05-16 PROCEDURE — 99214 OFFICE O/P EST MOD 30 MIN: CPT | Performed by: FAMILY MEDICINE

## 2023-05-16 RX ORDER — TRAMADOL HYDROCHLORIDE 50 MG/1
50 TABLET ORAL EVERY 8 HOURS PRN
COMMUNITY

## 2023-05-16 ASSESSMENT — PATIENT HEALTH QUESTIONNAIRE - PHQ9
SUM OF ALL RESPONSES TO PHQ QUESTIONS 1-9: 8
7. TROUBLE CONCENTRATING ON THINGS, SUCH AS READING THE NEWSPAPER OR WATCHING TELEVISION: 0
9. THOUGHTS THAT YOU WOULD BE BETTER OFF DEAD, OR OF HURTING YOURSELF: 0
2. FEELING DOWN, DEPRESSED OR HOPELESS: 2
1. LITTLE INTEREST OR PLEASURE IN DOING THINGS: 2
5. POOR APPETITE OR OVEREATING: 0
SUM OF ALL RESPONSES TO PHQ QUESTIONS 1-9: 8
8. MOVING OR SPEAKING SO SLOWLY THAT OTHER PEOPLE COULD HAVE NOTICED. OR THE OPPOSITE, BEING SO FIGETY OR RESTLESS THAT YOU HAVE BEEN MOVING AROUND A LOT MORE THAN USUAL: 0
10. IF YOU CHECKED OFF ANY PROBLEMS, HOW DIFFICULT HAVE THESE PROBLEMS MADE IT FOR YOU TO DO YOUR WORK, TAKE CARE OF THINGS AT HOME, OR GET ALONG WITH OTHER PEOPLE: 1
4. FEELING TIRED OR HAVING LITTLE ENERGY: 3
3. TROUBLE FALLING OR STAYING ASLEEP: 1
6. FEELING BAD ABOUT YOURSELF - OR THAT YOU ARE A FAILURE OR HAVE LET YOURSELF OR YOUR FAMILY DOWN: 0
SUM OF ALL RESPONSES TO PHQ QUESTIONS 1-9: 8
SUM OF ALL RESPONSES TO PHQ QUESTIONS 1-9: 8
SUM OF ALL RESPONSES TO PHQ9 QUESTIONS 1 & 2: 4

## 2023-05-16 ASSESSMENT — ENCOUNTER SYMPTOMS: RESPIRATORY NEGATIVE: 1

## 2023-05-16 NOTE — PROGRESS NOTES
PROGRESS NOTE    SUBJECTIVE:   Early Leo is a 80 y.o. female seen for a follow up visit regarding   Chief Complaint   Patient presents with    Urinary Frequency     Reports increased frequency to urinate; having to change pad twice at night. Increased fatigue    Foot Burn     Complains of burning sensation in bilateral feet. HPI:  Patient is here for follow-up of her chronic problems. She continues to experience fatigue but today describes it more of a hypersomnia, and less so fatigue. She endorses sleeping relatively well but gets up twice at night to urinate. She also endorses having some stress and overflow urinary incontinence. She has Myrbetriq at home but is not taking it due to fear of side effects. She also endorses having some burning in her feet. Reviewed and updated this visit by provider:           Review of Systems   Respiratory: Negative. Cardiovascular: Negative. OBJECTIVE:  Vitals:    05/16/23 1429   BP: 108/68   Site: Left Upper Arm   Cuff Size: Small Adult   Pulse: 64   SpO2: 96%   Weight: 108 lb 9.6 oz (49.3 kg)   Height: 5' 2\" (1.575 m)        Physical Exam   General: Alert and oriented x3, frail  Neck: No adenopathy, thyromegaly or thyroid nodules  Pulmonary: Normal effort, good airflow, no rales or rhonchi  CVS: Regular rate and rhythm, normal S1, S2, no S3 or S4, no murmurs; no carotid bruits, 2+ pedal pulses      Medical problems and test results were reviewed with the patient today. No results found for this or any previous visit (from the past 672 hour(s)). No results found for any visits on 05/16/23. ASSESSMENT and PLAN    1. Neuropathic pain, burning in feet  -     Vitamin B12; Future  2. Primary hypertension, blood pressure remains low for age. Continue to follow her blood pressure, consider stopping hydrochlorothiazide. Encouraged hydration and liberalizing salt intake.   3. Atherosclerosis of native coronary artery of native heart

## 2023-06-06 DIAGNOSIS — E53.8 B12 DEFICIENCY: Primary | ICD-10-CM

## 2023-06-06 RX ORDER — CYANOCOBALAMIN 1000 UG/ML
1000 INJECTION, SOLUTION INTRAMUSCULAR; SUBCUTANEOUS
Qty: 1 ML | Refills: 5 | Status: SHIPPED | OUTPATIENT
Start: 2023-06-06 | End: 2023-06-07

## 2023-06-07 RX ORDER — CYANOCOBALAMIN 1000 UG/ML
1000 INJECTION, SOLUTION INTRAMUSCULAR; SUBCUTANEOUS
Qty: 1 ML | Refills: 5 | Status: SHIPPED | OUTPATIENT
Start: 2023-06-07 | End: 2038-02-19

## 2023-06-09 ENCOUNTER — NURSE ONLY (OUTPATIENT)
Dept: FAMILY MEDICINE CLINIC | Facility: CLINIC | Age: 88
End: 2023-06-09
Payer: MEDICARE

## 2023-06-09 DIAGNOSIS — E53.8 B12 DEFICIENCY: Primary | ICD-10-CM

## 2023-06-09 PROCEDURE — 96372 THER/PROPH/DIAG INJ SC/IM: CPT | Performed by: FAMILY MEDICINE

## 2023-06-09 RX ORDER — CYANOCOBALAMIN 1000 UG/ML
1000 INJECTION, SOLUTION INTRAMUSCULAR; SUBCUTANEOUS ONCE
Status: COMPLETED | OUTPATIENT
Start: 2023-06-09 | End: 2023-06-09

## 2023-06-09 RX ADMIN — CYANOCOBALAMIN 1000 MCG: 1000 INJECTION, SOLUTION INTRAMUSCULAR; SUBCUTANEOUS at 11:55

## 2023-06-22 DIAGNOSIS — K22.70 BARRETT'S ESOPHAGUS WITHOUT DYSPLASIA: Primary | ICD-10-CM

## 2023-06-28 RX ORDER — OMEPRAZOLE 40 MG/1
40 CAPSULE, DELAYED RELEASE ORAL DAILY
Qty: 90 CAPSULE | Refills: 3 | Status: SHIPPED | OUTPATIENT
Start: 2023-06-28 | End: 2024-06-27

## 2023-07-03 ENCOUNTER — NURSE TRIAGE (OUTPATIENT)
Dept: OTHER | Facility: CLINIC | Age: 88
End: 2023-07-03

## 2023-07-03 ENCOUNTER — TELEPHONE (OUTPATIENT)
Dept: FAMILY MEDICINE CLINIC | Facility: CLINIC | Age: 88
End: 2023-07-03

## 2023-07-03 NOTE — TELEPHONE ENCOUNTER
Patient requesting a callback. She was transferred to me from nurse triage and was advised by triage and myself about emergency room. Patient declined. Patient has been experiencing black stool and vomit and is very weak.

## 2023-07-03 NOTE — TELEPHONE ENCOUNTER
Location of patient: [place]    Received call from Mumtaz Porter at [employer] with [employer]. Subjective: Caller states \"vomit and stool is black \"     Current Symptoms: stool and vomit is black  total of 3 times feels weak , does have a h/a no dizziness hx htn and anemia, denies abd pain last black bowel movement was yesterday , last vomit was yesterday and it was black      Onset: 3 days     Associated Symptoms: NA    Pain Severity: 4 /10    Temperature: none       What has been tried: applesause, gingerale     LMP: NA Pregnant: NA    Recommended disposition: Go to ED Now pt refused er so called the office and connected with [facility]     Care advice provided, patient verbalizes understanding; denies any other questions or concerns; instructed to call back for any new or worsening symptoms. Writer provided warm transfer to [facility] for black stools and vomit    Attention Provider: Thank you for allowing me to participate in the care of your patient. The patient was connected to triage in response to information provided to the ECC/PSC. Please do not respond through this encounter as the response is not directed to a shared pool.          Reason for Disposition   Bloody, black, or tarry bowel movements  (Exception: Chronic-unchanged black-grey bowel movements and is taking iron pills or Pepto-Bismol.)    Protocols used: Rectal Bleeding-ADULT-[place]

## 2023-07-03 NOTE — TELEPHONE ENCOUNTER
Patient contacted. Advised ER now due to dark stools, weakness and vomiting. Patient voiced understanding and reports she will get ready to head to the ER now.

## 2023-07-10 ENCOUNTER — NURSE ONLY (OUTPATIENT)
Dept: FAMILY MEDICINE CLINIC | Facility: CLINIC | Age: 88
End: 2023-07-10

## 2023-07-10 ENCOUNTER — OFFICE VISIT (OUTPATIENT)
Dept: FAMILY MEDICINE CLINIC | Facility: CLINIC | Age: 88
End: 2023-07-10
Payer: MEDICARE

## 2023-07-10 VITALS
BODY MASS INDEX: 19.25 KG/M2 | SYSTOLIC BLOOD PRESSURE: 110 MMHG | WEIGHT: 104.6 LBS | OXYGEN SATURATION: 99 % | HEART RATE: 79 BPM | HEIGHT: 62 IN | DIASTOLIC BLOOD PRESSURE: 60 MMHG

## 2023-07-10 DIAGNOSIS — I10 PRIMARY HYPERTENSION: ICD-10-CM

## 2023-07-10 DIAGNOSIS — E53.8 B12 DEFICIENCY: Primary | ICD-10-CM

## 2023-07-10 DIAGNOSIS — K92.1 MELENA: Primary | ICD-10-CM

## 2023-07-10 DIAGNOSIS — E78.00 PURE HYPERCHOLESTEROLEMIA: ICD-10-CM

## 2023-07-10 DIAGNOSIS — K92.0 HEMATEMESIS WITH NAUSEA: ICD-10-CM

## 2023-07-10 LAB
ALBUMIN SERPL-MCNC: 3.3 G/DL (ref 3.2–4.6)
ALBUMIN/GLOB SERPL: 1.1 (ref 0.4–1.6)
ALP SERPL-CCNC: 65 U/L (ref 50–136)
ALT SERPL-CCNC: 17 U/L (ref 12–65)
ANION GAP SERPL CALC-SCNC: 6 MMOL/L (ref 2–11)
AST SERPL-CCNC: 13 U/L (ref 15–37)
BASOPHILS # BLD: 0 K/UL (ref 0–0.2)
BASOPHILS NFR BLD: 0 % (ref 0–2)
BILIRUB DIRECT SERPL-MCNC: 0.1 MG/DL
BILIRUB SERPL-MCNC: 0.3 MG/DL (ref 0.2–1.1)
BUN SERPL-MCNC: 9 MG/DL (ref 8–23)
CALCIUM SERPL-MCNC: 8.8 MG/DL (ref 8.3–10.4)
CHLORIDE SERPL-SCNC: 106 MMOL/L (ref 101–110)
CO2 SERPL-SCNC: 25 MMOL/L (ref 21–32)
CREAT SERPL-MCNC: 0.7 MG/DL (ref 0.6–1)
DIFFERENTIAL METHOD BLD: ABNORMAL
EOSINOPHIL # BLD: 0 K/UL (ref 0–0.8)
EOSINOPHIL NFR BLD: 1 % (ref 0.5–7.8)
ERYTHROCYTE [DISTWIDTH] IN BLOOD BY AUTOMATED COUNT: 13.8 % (ref 11.9–14.6)
GLOBULIN SER CALC-MCNC: 3.1 G/DL (ref 2.8–4.5)
GLUCOSE SERPL-MCNC: 78 MG/DL (ref 65–100)
HCT VFR BLD AUTO: 27.3 % (ref 35.8–46.3)
HGB BLD-MCNC: 8.6 G/DL (ref 11.7–15.4)
IMM GRANULOCYTES # BLD AUTO: 0 K/UL (ref 0–0.5)
IMM GRANULOCYTES NFR BLD AUTO: 0 % (ref 0–5)
LYMPHOCYTES # BLD: 1.8 K/UL (ref 0.5–4.6)
LYMPHOCYTES NFR BLD: 44 % (ref 13–44)
MCH RBC QN AUTO: 30.6 PG (ref 26.1–32.9)
MCHC RBC AUTO-ENTMCNC: 31.5 G/DL (ref 31.4–35)
MCV RBC AUTO: 97.2 FL (ref 82–102)
MONOCYTES # BLD: 0.5 K/UL (ref 0.1–1.3)
MONOCYTES NFR BLD: 12 % (ref 4–12)
NEUTS SEG # BLD: 1.8 K/UL (ref 1.7–8.2)
NEUTS SEG NFR BLD: 43 % (ref 43–78)
NRBC # BLD: 0 K/UL (ref 0–0.2)
PLATELET # BLD AUTO: 268 K/UL (ref 150–450)
PMV BLD AUTO: 8.7 FL (ref 9.4–12.3)
POTASSIUM SERPL-SCNC: 3.9 MMOL/L (ref 3.5–5.1)
PROT SERPL-MCNC: 6.4 G/DL (ref 6.3–8.2)
RBC # BLD AUTO: 2.81 M/UL (ref 4.05–5.2)
SODIUM SERPL-SCNC: 137 MMOL/L (ref 133–143)
WBC # BLD AUTO: 4.1 K/UL (ref 4.3–11.1)

## 2023-07-10 PROCEDURE — 1123F ACP DISCUSS/DSCN MKR DOCD: CPT | Performed by: FAMILY MEDICINE

## 2023-07-10 PROCEDURE — G8420 CALC BMI NORM PARAMETERS: HCPCS | Performed by: FAMILY MEDICINE

## 2023-07-10 PROCEDURE — 99214 OFFICE O/P EST MOD 30 MIN: CPT | Performed by: FAMILY MEDICINE

## 2023-07-10 PROCEDURE — G8427 DOCREV CUR MEDS BY ELIG CLIN: HCPCS | Performed by: FAMILY MEDICINE

## 2023-07-10 PROCEDURE — 1036F TOBACCO NON-USER: CPT | Performed by: FAMILY MEDICINE

## 2023-07-10 PROCEDURE — 1090F PRES/ABSN URINE INCON ASSESS: CPT | Performed by: FAMILY MEDICINE

## 2023-07-10 RX ORDER — CYANOCOBALAMIN 1000 UG/ML
1000 INJECTION, SOLUTION INTRAMUSCULAR; SUBCUTANEOUS ONCE
Status: COMPLETED | OUTPATIENT
Start: 2023-07-10 | End: 2023-07-10

## 2023-07-10 RX ADMIN — CYANOCOBALAMIN 1000 MCG: 1000 INJECTION, SOLUTION INTRAMUSCULAR; SUBCUTANEOUS at 11:18

## 2023-07-10 ASSESSMENT — PATIENT HEALTH QUESTIONNAIRE - PHQ9
SUM OF ALL RESPONSES TO PHQ QUESTIONS 1-9: 0
1. LITTLE INTEREST OR PLEASURE IN DOING THINGS: 0
SUM OF ALL RESPONSES TO PHQ QUESTIONS 1-9: 0
3. TROUBLE FALLING OR STAYING ASLEEP: 0
10. IF YOU CHECKED OFF ANY PROBLEMS, HOW DIFFICULT HAVE THESE PROBLEMS MADE IT FOR YOU TO DO YOUR WORK, TAKE CARE OF THINGS AT HOME, OR GET ALONG WITH OTHER PEOPLE: 0
SUM OF ALL RESPONSES TO PHQ QUESTIONS 1-9: 0
SUM OF ALL RESPONSES TO PHQ9 QUESTIONS 1 & 2: 0
4. FEELING TIRED OR HAVING LITTLE ENERGY: 0
7. TROUBLE CONCENTRATING ON THINGS, SUCH AS READING THE NEWSPAPER OR WATCHING TELEVISION: 0
2. FEELING DOWN, DEPRESSED OR HOPELESS: 0
5. POOR APPETITE OR OVEREATING: 0
SUM OF ALL RESPONSES TO PHQ QUESTIONS 1-9: 0
9. THOUGHTS THAT YOU WOULD BE BETTER OFF DEAD, OR OF HURTING YOURSELF: 0
8. MOVING OR SPEAKING SO SLOWLY THAT OTHER PEOPLE COULD HAVE NOTICED. OR THE OPPOSITE, BEING SO FIGETY OR RESTLESS THAT YOU HAVE BEEN MOVING AROUND A LOT MORE THAN USUAL: 0
6. FEELING BAD ABOUT YOURSELF - OR THAT YOU ARE A FAILURE OR HAVE LET YOURSELF OR YOUR FAMILY DOWN: 0

## 2023-07-10 NOTE — PROGRESS NOTES
PROGRESS NOTE    SUBJECTIVE:   Robert Augustin is a 80 y.o. female seen for a follow up visit regarding   Chief Complaint   Patient presents with    Nausea & Vomiting     Reports having a episode last week where she was vomiting 'black' liquids and stools were black. No more episodes but very weak since then        HPI:  Has been out of prilosec, developed heart burn, began feeling bad 9 days ago. Did not sleep well. Woke up and vomited black  liquid. Continued to feel poorly the next day. Was able to drink some fluids the next day. Has been doing better during the next week, now eating regular diet, has an appetite. Got back on her omeprazole 4 days ago. Reviewed and updated this visit by provider:  Tobacco  Allergies  Meds  Problems  Med Hx  Surg Hx  Fam Hx           Review of Systems       OBJECTIVE:  Vitals:    07/10/23 1119   BP: 110/60   Site: Left Upper Arm   Cuff Size: Small Adult   Pulse: 79   SpO2: 99%   Weight: 104 lb 9.6 oz (47.4 kg)   Height: 5' 2\" (1.575 m)        Physical Exam   General: Alert and oriented x3, frail-appearing, in no distress presently  Neck: No adenopathy, thyromegaly or thyroid nodules  Pulmonary: Normal effort, good airflow, no rales or rhonchi  CVS: Regular rate and rhythm, normal S1, S2, no S3 or S4, no murmurs; no carotid bruits, 2+ pedal pulses      Medical problems and test results were reviewed with the patient today. No results found for this or any previous visit (from the past 672 hour(s)). No results found for any visits on 07/10/23. ASSESSMENT and PLAN    1. Melena, resolved, eating a regular diet without symptoms  2. Pure hypercholesterolemia  -     CBC with Auto Differential  -     Basic Metabolic Panel  3. Primary hypertension, controlled  -     Hepatic Function Panel  4. Hematemesis with nausea    Patient looks well today, at baseline. Patient is back on her PPI, continue. Call if recurrent symptoms. No follow-ups on file.        Ange Siddiqui

## 2023-07-14 ENCOUNTER — OFFICE VISIT (OUTPATIENT)
Dept: FAMILY MEDICINE CLINIC | Facility: CLINIC | Age: 88
End: 2023-07-14
Payer: MEDICARE

## 2023-07-14 VITALS
HEIGHT: 62 IN | WEIGHT: 106.6 LBS | HEART RATE: 76 BPM | OXYGEN SATURATION: 99 % | BODY MASS INDEX: 19.62 KG/M2 | DIASTOLIC BLOOD PRESSURE: 64 MMHG | SYSTOLIC BLOOD PRESSURE: 106 MMHG

## 2023-07-14 DIAGNOSIS — D50.9 IRON DEFICIENCY ANEMIA, UNSPECIFIED IRON DEFICIENCY ANEMIA TYPE: ICD-10-CM

## 2023-07-14 DIAGNOSIS — K92.2 UPPER GI BLEED: Primary | ICD-10-CM

## 2023-07-14 DIAGNOSIS — D63.1 ANEMIA IN CHRONIC KIDNEY DISEASE, UNSPECIFIED CKD STAGE: ICD-10-CM

## 2023-07-14 DIAGNOSIS — N18.9 ANEMIA IN CHRONIC KIDNEY DISEASE, UNSPECIFIED CKD STAGE: ICD-10-CM

## 2023-07-14 LAB — HEMOGLOBIN, POC: 9.8 G/DL

## 2023-07-14 PROCEDURE — G8420 CALC BMI NORM PARAMETERS: HCPCS | Performed by: FAMILY MEDICINE

## 2023-07-14 PROCEDURE — 99213 OFFICE O/P EST LOW 20 MIN: CPT | Performed by: FAMILY MEDICINE

## 2023-07-14 PROCEDURE — 1123F ACP DISCUSS/DSCN MKR DOCD: CPT | Performed by: FAMILY MEDICINE

## 2023-07-14 PROCEDURE — G8427 DOCREV CUR MEDS BY ELIG CLIN: HCPCS | Performed by: FAMILY MEDICINE

## 2023-07-14 PROCEDURE — 85018 HEMOGLOBIN: CPT | Performed by: FAMILY MEDICINE

## 2023-07-14 PROCEDURE — 1036F TOBACCO NON-USER: CPT | Performed by: FAMILY MEDICINE

## 2023-07-14 PROCEDURE — 1090F PRES/ABSN URINE INCON ASSESS: CPT | Performed by: FAMILY MEDICINE

## 2023-07-14 RX ORDER — FERROUS SULFATE 325(65) MG
325 TABLET ORAL 2 TIMES DAILY
Qty: 180 TABLET | Refills: 1 | Status: SHIPPED | OUTPATIENT
Start: 2023-07-14

## 2023-07-14 ASSESSMENT — PATIENT HEALTH QUESTIONNAIRE - PHQ9
2. FEELING DOWN, DEPRESSED OR HOPELESS: 0
SUM OF ALL RESPONSES TO PHQ QUESTIONS 1-9: 0
7. TROUBLE CONCENTRATING ON THINGS, SUCH AS READING THE NEWSPAPER OR WATCHING TELEVISION: 0
3. TROUBLE FALLING OR STAYING ASLEEP: 0
SUM OF ALL RESPONSES TO PHQ9 QUESTIONS 1 & 2: 0
5. POOR APPETITE OR OVEREATING: 0
9. THOUGHTS THAT YOU WOULD BE BETTER OFF DEAD, OR OF HURTING YOURSELF: 0
1. LITTLE INTEREST OR PLEASURE IN DOING THINGS: 0
8. MOVING OR SPEAKING SO SLOWLY THAT OTHER PEOPLE COULD HAVE NOTICED. OR THE OPPOSITE, BEING SO FIGETY OR RESTLESS THAT YOU HAVE BEEN MOVING AROUND A LOT MORE THAN USUAL: 0
6. FEELING BAD ABOUT YOURSELF - OR THAT YOU ARE A FAILURE OR HAVE LET YOURSELF OR YOUR FAMILY DOWN: 0
4. FEELING TIRED OR HAVING LITTLE ENERGY: 0
SUM OF ALL RESPONSES TO PHQ QUESTIONS 1-9: 0
10. IF YOU CHECKED OFF ANY PROBLEMS, HOW DIFFICULT HAVE THESE PROBLEMS MADE IT FOR YOU TO DO YOUR WORK, TAKE CARE OF THINGS AT HOME, OR GET ALONG WITH OTHER PEOPLE: 0

## 2023-07-14 ASSESSMENT — ENCOUNTER SYMPTOMS: RESPIRATORY NEGATIVE: 1

## 2023-07-14 NOTE — PROGRESS NOTES
PROGRESS NOTE    SUBJECTIVE:   Jitendra Lopez is a 80 y.o. female seen for a follow up visit regarding   Chief Complaint   Patient presents with    Discuss Labs     Here today to discuss recent lab results        HPI:  Patient recently seen with an episode of vomiting dark blood as well as having dark stool. At the time she did see me this had resolved and she had gotten back on her PPI. Was back to eating a regular diet. Her hemoglobin came back at 8.4 and I have asked her to return to discuss further investigation and to recheck her hemoglobin. She continues to eat her regular diet, no further melena. I had recommended she resume iron therapy which she had been on the past, she had an old bottle of iron pills left. She reports in the past she has been as low as 5.4. She does not recall the source of her bleeding in the past but has had upper and lower endoscopy 1 occasion by her report. Presently she is feeling relatively well, endorses is some decline in exertional tolerance. Denies chest pain. Reviewed and updated this visit by provider:           Review of Systems   Respiratory: Negative. Cardiovascular: Negative. OBJECTIVE:  Vitals:    07/14/23 1001   BP: 106/64   Site: Left Upper Arm   Cuff Size: Small Adult   Pulse: 76   SpO2: 99%   Weight: 106 lb 9.6 oz (48.4 kg)   Height: 5' 2\" (1.575 m)        Physical Exam   General: Alert, oriented x3, in no distress  Lungs: Good airflow bilaterally without rales or rhonchi  CVS: Regular rate and rhythm without murmur  Medical problems and test results were reviewed with the patient today.      Recent Results (from the past 672 hour(s))   CBC with Auto Differential    Collection Time: 07/10/23 11:51 AM   Result Value Ref Range    WBC 4.1 (L) 4.3 - 11.1 K/uL    RBC 2.81 (L) 4.05 - 5.2 M/uL    Hemoglobin 8.6 (L) 11.7 - 15.4 g/dL    Hematocrit 27.3 (L) 35.8 - 46.3 %    MCV 97.2 82 - 102 FL    MCH 30.6 26.1 - 32.9 PG    MCHC 31.5 31.4 - 35.0 g/dL

## 2023-07-21 DIAGNOSIS — D53.9 MACROCYTIC ANEMIA: ICD-10-CM

## 2023-07-21 DIAGNOSIS — I10 PRIMARY HYPERTENSION: ICD-10-CM

## 2023-07-21 DIAGNOSIS — D50.9 IRON DEFICIENCY ANEMIA, UNSPECIFIED IRON DEFICIENCY ANEMIA TYPE: Primary | ICD-10-CM

## 2023-07-21 DIAGNOSIS — K44.9 HIATAL HERNIA: ICD-10-CM

## 2023-07-21 DIAGNOSIS — D51.0 PERNICIOUS ANEMIA: ICD-10-CM

## 2023-07-21 RX ORDER — HYDROCHLOROTHIAZIDE 12.5 MG/1
12.5 CAPSULE, GELATIN COATED ORAL EVERY MORNING
Qty: 90 CAPSULE | Refills: 1 | Status: SHIPPED | OUTPATIENT
Start: 2023-07-21 | End: 2024-01-17

## 2023-07-21 NOTE — TELEPHONE ENCOUNTER
Patient came by office stating GI is wanting her to have lab work done in 2 weeks to recheck CBC, Iron, TIBC, CEA and Ferritin. Let patient know we could order labs for her to have done her. Patient voiced thanks. Patient also needing refill on HCTZ sent to [facility]. Order pended.

## 2023-08-04 ENCOUNTER — NURSE ONLY (OUTPATIENT)
Dept: FAMILY MEDICINE CLINIC | Facility: CLINIC | Age: 88
End: 2023-08-04

## 2023-08-04 DIAGNOSIS — K92.1 MELENA: ICD-10-CM

## 2023-08-04 DIAGNOSIS — D53.9 MACROCYTIC ANEMIA: ICD-10-CM

## 2023-08-04 DIAGNOSIS — D51.0 PERNICIOUS ANEMIA: ICD-10-CM

## 2023-08-04 DIAGNOSIS — I10 PRIMARY HYPERTENSION: ICD-10-CM

## 2023-08-04 DIAGNOSIS — D50.9 IRON DEFICIENCY ANEMIA, UNSPECIFIED IRON DEFICIENCY ANEMIA TYPE: ICD-10-CM

## 2023-08-04 DIAGNOSIS — K44.9 HIATAL HERNIA: ICD-10-CM

## 2023-08-04 LAB
ALBUMIN SERPL-MCNC: 3.2 G/DL (ref 3.2–4.6)
ALBUMIN/GLOB SERPL: 0.9 (ref 0.4–1.6)
ALP SERPL-CCNC: 69 U/L (ref 50–136)
ALT SERPL-CCNC: 13 U/L (ref 12–65)
ANION GAP SERPL CALC-SCNC: 7 MMOL/L (ref 2–11)
AST SERPL-CCNC: 12 U/L (ref 15–37)
BASOPHILS # BLD: 0 K/UL (ref 0–0.2)
BASOPHILS NFR BLD: 0 % (ref 0–2)
BILIRUB SERPL-MCNC: 0.3 MG/DL (ref 0.2–1.1)
BUN SERPL-MCNC: 6 MG/DL (ref 8–23)
CALCIUM SERPL-MCNC: 8.7 MG/DL (ref 8.3–10.4)
CEA SERPL-MCNC: 1.9 NG/ML (ref 0–3)
CHLORIDE SERPL-SCNC: 108 MMOL/L (ref 101–110)
CO2 SERPL-SCNC: 27 MMOL/L (ref 21–32)
CREAT SERPL-MCNC: 0.7 MG/DL (ref 0.6–1)
DIFFERENTIAL METHOD BLD: ABNORMAL
EOSINOPHIL # BLD: 0 K/UL (ref 0–0.8)
EOSINOPHIL NFR BLD: 0 % (ref 0.5–7.8)
ERYTHROCYTE [DISTWIDTH] IN BLOOD BY AUTOMATED COUNT: 15.1 % (ref 11.9–14.6)
FERRITIN SERPL-MCNC: 17 NG/ML (ref 8–388)
GLOBULIN SER CALC-MCNC: 3.5 G/DL (ref 2.8–4.5)
GLUCOSE SERPL-MCNC: 68 MG/DL (ref 65–100)
HCT VFR BLD AUTO: 32 % (ref 35.8–46.3)
HGB BLD-MCNC: 9.5 G/DL (ref 11.7–15.4)
IMM GRANULOCYTES # BLD AUTO: 0 K/UL (ref 0–0.5)
IMM GRANULOCYTES NFR BLD AUTO: 0 % (ref 0–5)
IRON SERPL-MCNC: 20 UG/DL (ref 35–150)
LYMPHOCYTES # BLD: 1.3 K/UL (ref 0.5–4.6)
LYMPHOCYTES NFR BLD: 40 % (ref 13–44)
MCH RBC QN AUTO: 27.9 PG (ref 26.1–32.9)
MCHC RBC AUTO-ENTMCNC: 29.7 G/DL (ref 31.4–35)
MCV RBC AUTO: 93.8 FL (ref 82–102)
MONOCYTES # BLD: 0.3 K/UL (ref 0.1–1.3)
MONOCYTES NFR BLD: 11 % (ref 4–12)
NEUTS SEG # BLD: 1.6 K/UL (ref 1.7–8.2)
NEUTS SEG NFR BLD: 49 % (ref 43–78)
NRBC # BLD: 0 K/UL (ref 0–0.2)
PLATELET # BLD AUTO: 255 K/UL (ref 150–450)
PMV BLD AUTO: 8.8 FL (ref 9.4–12.3)
POTASSIUM SERPL-SCNC: 3.6 MMOL/L (ref 3.5–5.1)
PROT SERPL-MCNC: 6.7 G/DL (ref 6.3–8.2)
RBC # BLD AUTO: 3.41 M/UL (ref 4.05–5.2)
SODIUM SERPL-SCNC: 142 MMOL/L (ref 133–143)
TIBC SERPL-MCNC: 400 UG/DL (ref 250–450)
WBC # BLD AUTO: 3.2 K/UL (ref 4.3–11.1)

## 2023-08-10 ENCOUNTER — NURSE ONLY (OUTPATIENT)
Dept: FAMILY MEDICINE CLINIC | Facility: CLINIC | Age: 88
End: 2023-08-10
Payer: MEDICARE

## 2023-08-10 DIAGNOSIS — E53.8 B12 DEFICIENCY: Primary | ICD-10-CM

## 2023-08-10 PROCEDURE — 96372 THER/PROPH/DIAG INJ SC/IM: CPT | Performed by: FAMILY MEDICINE

## 2023-08-10 RX ORDER — CYANOCOBALAMIN 1000 UG/ML
1000 INJECTION, SOLUTION INTRAMUSCULAR; SUBCUTANEOUS ONCE
Status: COMPLETED | OUTPATIENT
Start: 2023-08-10 | End: 2023-08-10

## 2023-08-10 RX ADMIN — CYANOCOBALAMIN 1000 MCG: 1000 INJECTION, SOLUTION INTRAMUSCULAR; SUBCUTANEOUS at 09:53

## 2023-08-23 DIAGNOSIS — D64.89 ANEMIA DUE TO OTHER CAUSE, NOT CLASSIFIED: ICD-10-CM

## 2023-08-24 RX ORDER — DIPHENHYDRAMINE HYDROCHLORIDE 50 MG/ML
50 INJECTION INTRAMUSCULAR; INTRAVENOUS
OUTPATIENT
Start: 2023-08-28

## 2023-08-24 RX ORDER — HEPARIN SODIUM (PORCINE) LOCK FLUSH IV SOLN 100 UNIT/ML 100 UNIT/ML
500 SOLUTION INTRAVENOUS PRN
OUTPATIENT
Start: 2023-08-28

## 2023-08-24 RX ORDER — ALBUTEROL SULFATE 90 UG/1
4 AEROSOL, METERED RESPIRATORY (INHALATION) PRN
OUTPATIENT
Start: 2023-08-28

## 2023-08-24 RX ORDER — SODIUM CHLORIDE 9 MG/ML
INJECTION, SOLUTION INTRAVENOUS CONTINUOUS
OUTPATIENT
Start: 2023-08-28

## 2023-08-24 RX ORDER — ACETAMINOPHEN 325 MG/1
650 TABLET ORAL
OUTPATIENT
Start: 2023-08-28

## 2023-08-24 RX ORDER — ACETAMINOPHEN 325 MG/1
650 TABLET ORAL ONCE
OUTPATIENT
Start: 2023-08-28 | End: 2023-08-28

## 2023-08-24 RX ORDER — SODIUM CHLORIDE 9 MG/ML
5-250 INJECTION, SOLUTION INTRAVENOUS PRN
OUTPATIENT
Start: 2023-08-28

## 2023-08-24 RX ORDER — FAMOTIDINE 10 MG/ML
20 INJECTION, SOLUTION INTRAVENOUS
OUTPATIENT
Start: 2023-08-28

## 2023-08-24 RX ORDER — DIPHENHYDRAMINE HYDROCHLORIDE 50 MG/ML
25 INJECTION INTRAMUSCULAR; INTRAVENOUS ONCE
OUTPATIENT
Start: 2023-08-28 | End: 2023-08-28

## 2023-08-24 RX ORDER — ONDANSETRON 2 MG/ML
8 INJECTION INTRAMUSCULAR; INTRAVENOUS
OUTPATIENT
Start: 2023-08-28

## 2023-08-24 RX ORDER — SODIUM CHLORIDE 0.9 % (FLUSH) 0.9 %
5-40 SYRINGE (ML) INJECTION PRN
OUTPATIENT
Start: 2023-08-28

## 2023-08-24 RX ORDER — EPINEPHRINE 1 MG/ML
0.3 INJECTION, SOLUTION, CONCENTRATE INTRAVENOUS PRN
OUTPATIENT
Start: 2023-08-28

## 2023-09-05 ENCOUNTER — HOSPITAL ENCOUNTER (OUTPATIENT)
Dept: INFUSION THERAPY | Age: 88
Discharge: HOME | End: 2023-09-05
Payer: MEDICARE

## 2023-09-05 VITALS
DIASTOLIC BLOOD PRESSURE: 72 MMHG | SYSTOLIC BLOOD PRESSURE: 171 MMHG | RESPIRATION RATE: 16 BRPM | OXYGEN SATURATION: 97 % | HEART RATE: 61 BPM | TEMPERATURE: 97.7 F

## 2023-09-05 DIAGNOSIS — D64.89 ANEMIA DUE TO OTHER CAUSE, NOT CLASSIFIED: Primary | ICD-10-CM

## 2023-09-05 DIAGNOSIS — N18.9 ANEMIA IN CHRONIC KIDNEY DISEASE, UNSPECIFIED CKD STAGE: ICD-10-CM

## 2023-09-05 DIAGNOSIS — D63.1 ANEMIA IN CHRONIC KIDNEY DISEASE, UNSPECIFIED CKD STAGE: ICD-10-CM

## 2023-09-05 PROCEDURE — 6370000000 HC RX 637 (ALT 250 FOR IP): Performed by: FAMILY MEDICINE

## 2023-09-05 PROCEDURE — 6360000002 HC RX W HCPCS: Performed by: FAMILY MEDICINE

## 2023-09-05 PROCEDURE — 96375 TX/PRO/DX INJ NEW DRUG ADDON: CPT

## 2023-09-05 PROCEDURE — 96374 THER/PROPH/DIAG INJ IV PUSH: CPT

## 2023-09-05 PROCEDURE — 2580000003 HC RX 258: Performed by: FAMILY MEDICINE

## 2023-09-05 RX ORDER — DIPHENHYDRAMINE HYDROCHLORIDE 50 MG/ML
25 INJECTION INTRAMUSCULAR; INTRAVENOUS ONCE
Status: CANCELLED | OUTPATIENT
Start: 2023-09-05 | End: 2023-09-05

## 2023-09-05 RX ORDER — DIPHENHYDRAMINE HYDROCHLORIDE 50 MG/ML
50 INJECTION INTRAMUSCULAR; INTRAVENOUS
Status: CANCELLED | OUTPATIENT
Start: 2023-09-05

## 2023-09-05 RX ORDER — SODIUM CHLORIDE 9 MG/ML
INJECTION, SOLUTION INTRAVENOUS CONTINUOUS
Status: CANCELLED | OUTPATIENT
Start: 2023-09-05

## 2023-09-05 RX ORDER — SODIUM CHLORIDE 9 MG/ML
INJECTION, SOLUTION INTRAVENOUS CONTINUOUS
Status: DISCONTINUED | OUTPATIENT
Start: 2023-09-05 | End: 2023-09-06 | Stop reason: HOSPADM

## 2023-09-05 RX ORDER — SODIUM CHLORIDE 9 MG/ML
5-250 INJECTION, SOLUTION INTRAVENOUS PRN
Status: DISCONTINUED | OUTPATIENT
Start: 2023-09-05 | End: 2023-09-06 | Stop reason: HOSPADM

## 2023-09-05 RX ORDER — DIPHENHYDRAMINE HYDROCHLORIDE 50 MG/ML
25 INJECTION INTRAMUSCULAR; INTRAVENOUS ONCE
Status: COMPLETED | OUTPATIENT
Start: 2023-09-05 | End: 2023-09-05

## 2023-09-05 RX ORDER — SODIUM CHLORIDE 9 MG/ML
5-250 INJECTION, SOLUTION INTRAVENOUS PRN
Status: CANCELLED | OUTPATIENT
Start: 2023-09-05

## 2023-09-05 RX ORDER — ONDANSETRON 2 MG/ML
8 INJECTION INTRAMUSCULAR; INTRAVENOUS
Status: DISCONTINUED | OUTPATIENT
Start: 2023-09-05 | End: 2023-09-06 | Stop reason: HOSPADM

## 2023-09-05 RX ORDER — DICYCLOMINE HYDROCHLORIDE 10 MG/1
10 CAPSULE ORAL 3 TIMES DAILY
Qty: 120 CAPSULE | Refills: 5 | Status: SHIPPED | OUTPATIENT
Start: 2023-09-05

## 2023-09-05 RX ORDER — ALBUTEROL SULFATE 90 UG/1
4 AEROSOL, METERED RESPIRATORY (INHALATION) PRN
Status: DISCONTINUED | OUTPATIENT
Start: 2023-09-05 | End: 2023-09-06 | Stop reason: HOSPADM

## 2023-09-05 RX ORDER — ACETAMINOPHEN 325 MG/1
650 TABLET ORAL ONCE
Status: CANCELLED | OUTPATIENT
Start: 2023-09-05 | End: 2023-09-05

## 2023-09-05 RX ORDER — ONDANSETRON 2 MG/ML
8 INJECTION INTRAMUSCULAR; INTRAVENOUS
Status: CANCELLED | OUTPATIENT
Start: 2023-09-05

## 2023-09-05 RX ORDER — SODIUM CHLORIDE 0.9 % (FLUSH) 0.9 %
5-40 SYRINGE (ML) INJECTION PRN
Status: CANCELLED | OUTPATIENT
Start: 2023-09-05

## 2023-09-05 RX ORDER — ACETAMINOPHEN 325 MG/1
650 TABLET ORAL
Status: CANCELLED | OUTPATIENT
Start: 2023-09-05

## 2023-09-05 RX ORDER — HEPARIN 100 UNIT/ML
500 SYRINGE INTRAVENOUS PRN
Status: CANCELLED | OUTPATIENT
Start: 2023-09-05

## 2023-09-05 RX ORDER — ACETAMINOPHEN 325 MG/1
650 TABLET ORAL
Status: DISCONTINUED | OUTPATIENT
Start: 2023-09-05 | End: 2023-09-06 | Stop reason: HOSPADM

## 2023-09-05 RX ORDER — DIPHENHYDRAMINE HYDROCHLORIDE 50 MG/ML
50 INJECTION INTRAMUSCULAR; INTRAVENOUS
Status: DISCONTINUED | OUTPATIENT
Start: 2023-09-05 | End: 2023-09-06 | Stop reason: HOSPADM

## 2023-09-05 RX ORDER — EPINEPHRINE 1 MG/ML
0.3 INJECTION, SOLUTION, CONCENTRATE INTRAVENOUS PRN
Status: CANCELLED | OUTPATIENT
Start: 2023-09-05

## 2023-09-05 RX ORDER — ACETAMINOPHEN 325 MG/1
650 TABLET ORAL ONCE
Status: COMPLETED | OUTPATIENT
Start: 2023-09-05 | End: 2023-09-05

## 2023-09-05 RX ORDER — ALBUTEROL SULFATE 90 UG/1
4 AEROSOL, METERED RESPIRATORY (INHALATION) PRN
Status: CANCELLED | OUTPATIENT
Start: 2023-09-05

## 2023-09-05 RX ORDER — SODIUM CHLORIDE 0.9 % (FLUSH) 0.9 %
5-40 SYRINGE (ML) INJECTION PRN
Status: DISCONTINUED | OUTPATIENT
Start: 2023-09-05 | End: 2023-09-06 | Stop reason: HOSPADM

## 2023-09-05 RX ORDER — EPINEPHRINE 1 MG/ML
0.3 INJECTION, SOLUTION, CONCENTRATE INTRAVENOUS PRN
Status: DISCONTINUED | OUTPATIENT
Start: 2023-09-05 | End: 2023-09-06 | Stop reason: HOSPADM

## 2023-09-05 RX ADMIN — SODIUM CHLORIDE 25 ML/HR: 9 INJECTION, SOLUTION INTRAVENOUS at 10:36

## 2023-09-05 RX ADMIN — IRON SUCROSE 200 MG: 20 INJECTION, SOLUTION INTRAVENOUS at 10:52

## 2023-09-05 RX ADMIN — ACETAMINOPHEN 650 MG: 325 TABLET ORAL at 10:32

## 2023-09-05 RX ADMIN — SODIUM CHLORIDE, PRESERVATIVE FREE 10 ML: 5 INJECTION INTRAVENOUS at 10:30

## 2023-09-05 RX ADMIN — DIPHENHYDRAMINE HYDROCHLORIDE 25 MG: 50 INJECTION INTRAMUSCULAR; INTRAVENOUS at 10:33

## 2023-09-05 NOTE — PROGRESS NOTES
Arrived to the [address] [place] [address]. Venofer completed. Patient tolerated well. Any issues or concerns during appointment: none. Patient aware of next infusion appointment on 9/12/2023 at 9:45am.  Discharged ambulatory.

## 2023-09-05 NOTE — TELEPHONE ENCOUNTER
----- Message from Helen Jose Guadalupe sent at 9/1/2023  4:19 PM EDT -----  Subject: Refill Request    QUESTIONS  Name of Medication? dicyclomine (BENTYL) 10 MG capsule  Patient-reported dosage and instructions? 1 capsule 3x's per day (90 day   supply)   How many days do you have left? 0  Preferred Pharmacy? [facility] phone number (if available)? [phone]  Additional Information for Provider? Concetta from [address] [place] [address]   called in refill request for patient   ---------------------------------------------------------------------------  --------------  CALL BACK INFO  What is the best way for the office to contact you? OK to leave message on   voicemail  Preferred Call Back Phone Number? [phone]  ---------------------------------------------------------------------------  --------------  SCRIPT ANSWERS  Relationship to Patient? Covered Entity  Covered Entity Type? Pharmacy? Representative Name?  Concetta

## 2023-09-11 ENCOUNTER — NURSE ONLY (OUTPATIENT)
Dept: FAMILY MEDICINE CLINIC | Facility: CLINIC | Age: 88
End: 2023-09-11
Payer: MEDICARE

## 2023-09-11 DIAGNOSIS — E53.8 B12 DEFICIENCY: Primary | ICD-10-CM

## 2023-09-11 PROCEDURE — 96372 THER/PROPH/DIAG INJ SC/IM: CPT | Performed by: FAMILY MEDICINE

## 2023-09-11 RX ORDER — CYANOCOBALAMIN 1000 UG/ML
1000 INJECTION, SOLUTION INTRAMUSCULAR; SUBCUTANEOUS ONCE
Status: COMPLETED | OUTPATIENT
Start: 2023-09-11 | End: 2023-09-11

## 2023-09-11 RX ADMIN — CYANOCOBALAMIN 1000 MCG: 1000 INJECTION, SOLUTION INTRAMUSCULAR; SUBCUTANEOUS at 10:00

## 2023-09-12 ENCOUNTER — HOSPITAL ENCOUNTER (OUTPATIENT)
Dept: INFUSION THERAPY | Age: 88
Discharge: HOME | End: 2023-09-12
Payer: MEDICARE

## 2023-09-12 VITALS
SYSTOLIC BLOOD PRESSURE: 142 MMHG | OXYGEN SATURATION: 96 % | DIASTOLIC BLOOD PRESSURE: 76 MMHG | TEMPERATURE: 98.2 F | RESPIRATION RATE: 16 BRPM | HEART RATE: 74 BPM

## 2023-09-12 DIAGNOSIS — D64.89 ANEMIA DUE TO OTHER CAUSE, NOT CLASSIFIED: Primary | ICD-10-CM

## 2023-09-12 DIAGNOSIS — N18.9 ANEMIA IN CHRONIC KIDNEY DISEASE, UNSPECIFIED CKD STAGE: ICD-10-CM

## 2023-09-12 DIAGNOSIS — D63.1 ANEMIA IN CHRONIC KIDNEY DISEASE, UNSPECIFIED CKD STAGE: ICD-10-CM

## 2023-09-12 LAB
FERRITIN SERPL-MCNC: 127 NG/ML (ref 8–388)
HCT VFR BLD AUTO: 34.2 % (ref 35.8–46.3)
HGB BLD-MCNC: 10.7 G/DL (ref 11.7–15.4)
IRON SATN MFR SERPL: 21 %
IRON SERPL-MCNC: 74 UG/DL (ref 35–150)
TIBC SERPL-MCNC: 352 UG/DL (ref 250–450)

## 2023-09-12 PROCEDURE — 96374 THER/PROPH/DIAG INJ IV PUSH: CPT

## 2023-09-12 PROCEDURE — 85014 HEMATOCRIT: CPT

## 2023-09-12 PROCEDURE — 83540 ASSAY OF IRON: CPT

## 2023-09-12 PROCEDURE — 2580000003 HC RX 258: Performed by: FAMILY MEDICINE

## 2023-09-12 PROCEDURE — 82728 ASSAY OF FERRITIN: CPT

## 2023-09-12 PROCEDURE — 96375 TX/PRO/DX INJ NEW DRUG ADDON: CPT

## 2023-09-12 PROCEDURE — 83550 IRON BINDING TEST: CPT

## 2023-09-12 PROCEDURE — 85018 HEMOGLOBIN: CPT

## 2023-09-12 PROCEDURE — 6360000002 HC RX W HCPCS: Performed by: FAMILY MEDICINE

## 2023-09-12 PROCEDURE — 6370000000 HC RX 637 (ALT 250 FOR IP): Performed by: FAMILY MEDICINE

## 2023-09-12 RX ORDER — EPINEPHRINE 1 MG/ML
0.3 INJECTION, SOLUTION, CONCENTRATE INTRAVENOUS PRN
Status: CANCELLED | OUTPATIENT
Start: 2023-09-12

## 2023-09-12 RX ORDER — SODIUM CHLORIDE 9 MG/ML
5-250 INJECTION, SOLUTION INTRAVENOUS PRN
OUTPATIENT
Start: 2023-09-12

## 2023-09-12 RX ORDER — ALBUTEROL SULFATE 90 UG/1
4 AEROSOL, METERED RESPIRATORY (INHALATION) PRN
Status: DISCONTINUED | OUTPATIENT
Start: 2023-09-12 | End: 2023-09-13 | Stop reason: HOSPADM

## 2023-09-12 RX ORDER — SODIUM CHLORIDE 0.9 % (FLUSH) 0.9 %
5-40 SYRINGE (ML) INJECTION PRN
Status: CANCELLED | OUTPATIENT
Start: 2023-09-12

## 2023-09-12 RX ORDER — DIPHENHYDRAMINE HYDROCHLORIDE 50 MG/ML
50 INJECTION INTRAMUSCULAR; INTRAVENOUS
Status: DISCONTINUED | OUTPATIENT
Start: 2023-09-12 | End: 2023-09-13 | Stop reason: HOSPADM

## 2023-09-12 RX ORDER — SODIUM CHLORIDE 9 MG/ML
INJECTION, SOLUTION INTRAVENOUS CONTINUOUS
Status: DISCONTINUED | OUTPATIENT
Start: 2023-09-12 | End: 2023-09-13 | Stop reason: HOSPADM

## 2023-09-12 RX ORDER — SODIUM CHLORIDE 9 MG/ML
5-250 INJECTION, SOLUTION INTRAVENOUS PRN
Status: DISCONTINUED | OUTPATIENT
Start: 2023-09-12 | End: 2023-09-13 | Stop reason: HOSPADM

## 2023-09-12 RX ORDER — ONDANSETRON 2 MG/ML
8 INJECTION INTRAMUSCULAR; INTRAVENOUS
Status: CANCELLED | OUTPATIENT
Start: 2023-09-12

## 2023-09-12 RX ORDER — SODIUM CHLORIDE 9 MG/ML
5-250 INJECTION, SOLUTION INTRAVENOUS PRN
Status: CANCELLED | OUTPATIENT
Start: 2023-09-12

## 2023-09-12 RX ORDER — ACETAMINOPHEN 325 MG/1
650 TABLET ORAL
Status: DISCONTINUED | OUTPATIENT
Start: 2023-09-12 | End: 2023-09-13 | Stop reason: HOSPADM

## 2023-09-12 RX ORDER — ACETAMINOPHEN 325 MG/1
650 TABLET ORAL
Status: CANCELLED | OUTPATIENT
Start: 2023-09-12

## 2023-09-12 RX ORDER — ALBUTEROL SULFATE 90 UG/1
4 AEROSOL, METERED RESPIRATORY (INHALATION) PRN
Status: CANCELLED | OUTPATIENT
Start: 2023-09-12

## 2023-09-12 RX ORDER — EPINEPHRINE 1 MG/ML
0.3 INJECTION, SOLUTION, CONCENTRATE INTRAVENOUS PRN
Status: DISCONTINUED | OUTPATIENT
Start: 2023-09-12 | End: 2023-09-13 | Stop reason: HOSPADM

## 2023-09-12 RX ORDER — DIPHENHYDRAMINE HYDROCHLORIDE 50 MG/ML
50 INJECTION INTRAMUSCULAR; INTRAVENOUS
Status: CANCELLED | OUTPATIENT
Start: 2023-09-12

## 2023-09-12 RX ORDER — DIPHENHYDRAMINE HYDROCHLORIDE 50 MG/ML
25 INJECTION INTRAMUSCULAR; INTRAVENOUS ONCE
Status: CANCELLED | OUTPATIENT
Start: 2023-09-12 | End: 2023-09-12

## 2023-09-12 RX ORDER — ACETAMINOPHEN 325 MG/1
650 TABLET ORAL ONCE
Status: CANCELLED | OUTPATIENT
Start: 2023-09-12 | End: 2023-09-12

## 2023-09-12 RX ORDER — ONDANSETRON 2 MG/ML
8 INJECTION INTRAMUSCULAR; INTRAVENOUS
Status: DISCONTINUED | OUTPATIENT
Start: 2023-09-12 | End: 2023-09-13 | Stop reason: HOSPADM

## 2023-09-12 RX ORDER — DIPHENHYDRAMINE HYDROCHLORIDE 50 MG/ML
25 INJECTION INTRAMUSCULAR; INTRAVENOUS ONCE
Status: COMPLETED | OUTPATIENT
Start: 2023-09-12 | End: 2023-09-12

## 2023-09-12 RX ORDER — HEPARIN 100 UNIT/ML
500 SYRINGE INTRAVENOUS PRN
OUTPATIENT
Start: 2023-09-12

## 2023-09-12 RX ORDER — SODIUM CHLORIDE 9 MG/ML
INJECTION, SOLUTION INTRAVENOUS CONTINUOUS
Status: CANCELLED | OUTPATIENT
Start: 2023-09-12

## 2023-09-12 RX ORDER — ACETAMINOPHEN 325 MG/1
650 TABLET ORAL ONCE
Status: COMPLETED | OUTPATIENT
Start: 2023-09-12 | End: 2023-09-12

## 2023-09-12 RX ORDER — SODIUM CHLORIDE 0.9 % (FLUSH) 0.9 %
5-40 SYRINGE (ML) INJECTION PRN
Status: DISCONTINUED | OUTPATIENT
Start: 2023-09-12 | End: 2023-09-13 | Stop reason: HOSPADM

## 2023-09-12 RX ADMIN — SODIUM CHLORIDE, PRESERVATIVE FREE 10 ML: 5 INJECTION INTRAVENOUS at 11:50

## 2023-09-12 RX ADMIN — SODIUM CHLORIDE, PRESERVATIVE FREE 10 ML: 5 INJECTION INTRAVENOUS at 10:39

## 2023-09-12 RX ADMIN — DIPHENHYDRAMINE HYDROCHLORIDE 25 MG: 50 INJECTION INTRAMUSCULAR; INTRAVENOUS at 10:41

## 2023-09-12 RX ADMIN — IRON SUCROSE 200 MG: 20 INJECTION, SOLUTION INTRAVENOUS at 10:58

## 2023-09-12 RX ADMIN — SODIUM CHLORIDE 25 ML/HR: 9 INJECTION, SOLUTION INTRAVENOUS at 10:59

## 2023-09-12 RX ADMIN — ACETAMINOPHEN 650 MG: 325 TABLET ORAL at 10:40

## 2023-09-12 NOTE — PROGRESS NOTES
Arrived to the [address] [place] [address]. [facility]. Venofer completed. Patient tolerated well. Any issues or concerns during appointment: none. Discharged ambulatory.

## 2023-09-25 ENCOUNTER — OFFICE VISIT (OUTPATIENT)
Dept: FAMILY MEDICINE CLINIC | Facility: CLINIC | Age: 88
End: 2023-09-25
Payer: MEDICARE

## 2023-09-25 VITALS
DIASTOLIC BLOOD PRESSURE: 88 MMHG | HEART RATE: 66 BPM | SYSTOLIC BLOOD PRESSURE: 136 MMHG | OXYGEN SATURATION: 97 % | BODY MASS INDEX: 19.51 KG/M2 | HEIGHT: 62 IN | WEIGHT: 106 LBS

## 2023-09-25 DIAGNOSIS — F33.0 MAJOR DEPRESSIVE DISORDER, RECURRENT, MILD (HCC): ICD-10-CM

## 2023-09-25 DIAGNOSIS — I10 PRIMARY HYPERTENSION: Primary | ICD-10-CM

## 2023-09-25 DIAGNOSIS — F33.41 RECURRENT MAJOR DEPRESSIVE DISORDER, IN PARTIAL REMISSION (HCC): ICD-10-CM

## 2023-09-25 DIAGNOSIS — M15.9 PRIMARY OSTEOARTHRITIS INVOLVING MULTIPLE JOINTS: ICD-10-CM

## 2023-09-25 DIAGNOSIS — D50.9 IRON DEFICIENCY ANEMIA, UNSPECIFIED IRON DEFICIENCY ANEMIA TYPE: ICD-10-CM

## 2023-09-25 PROBLEM — F33.1 MAJOR DEPRESSIVE DISORDER, RECURRENT, MODERATE (HCC): Status: ACTIVE | Noted: 2023-09-25

## 2023-09-25 PROBLEM — F33.9 MAJOR DEPRESSIVE DISORDER, RECURRENT, UNSPECIFIED (HCC): Status: ACTIVE | Noted: 2023-09-25

## 2023-09-25 PROCEDURE — 1036F TOBACCO NON-USER: CPT | Performed by: FAMILY MEDICINE

## 2023-09-25 PROCEDURE — 1090F PRES/ABSN URINE INCON ASSESS: CPT | Performed by: FAMILY MEDICINE

## 2023-09-25 PROCEDURE — G8420 CALC BMI NORM PARAMETERS: HCPCS | Performed by: FAMILY MEDICINE

## 2023-09-25 PROCEDURE — G0439 PPPS, SUBSEQ VISIT: HCPCS | Performed by: FAMILY MEDICINE

## 2023-09-25 PROCEDURE — G8427 DOCREV CUR MEDS BY ELIG CLIN: HCPCS | Performed by: FAMILY MEDICINE

## 2023-09-25 PROCEDURE — 1123F ACP DISCUSS/DSCN MKR DOCD: CPT | Performed by: FAMILY MEDICINE

## 2023-09-25 PROCEDURE — 99214 OFFICE O/P EST MOD 30 MIN: CPT | Performed by: FAMILY MEDICINE

## 2023-09-25 RX ORDER — TRAMADOL HYDROCHLORIDE 50 MG/1
50 TABLET ORAL EVERY 8 HOURS PRN
Qty: 90 TABLET | Refills: 5 | Status: SHIPPED | OUTPATIENT
Start: 2023-09-25 | End: 2024-03-23

## 2023-09-25 RX ORDER — ESCITALOPRAM OXALATE 20 MG/1
20 TABLET ORAL DAILY
Qty: 90 TABLET | Refills: 1 | Status: SHIPPED | OUTPATIENT
Start: 2023-09-25 | End: 2024-03-23

## 2023-09-25 ASSESSMENT — PATIENT HEALTH QUESTIONNAIRE - PHQ9
SUM OF ALL RESPONSES TO PHQ QUESTIONS 1-9: 4
SUM OF ALL RESPONSES TO PHQ QUESTIONS 1-9: 4
5. POOR APPETITE OR OVEREATING: 0
9. THOUGHTS THAT YOU WOULD BE BETTER OFF DEAD, OR OF HURTING YOURSELF: 0
4. FEELING TIRED OR HAVING LITTLE ENERGY: 1
SUM OF ALL RESPONSES TO PHQ QUESTIONS 1-9: 4
7. TROUBLE CONCENTRATING ON THINGS, SUCH AS READING THE NEWSPAPER OR WATCHING TELEVISION: 0
3. TROUBLE FALLING OR STAYING ASLEEP: 1
1. LITTLE INTEREST OR PLEASURE IN DOING THINGS: 0
SUM OF ALL RESPONSES TO PHQ9 QUESTIONS 1 & 2: 2
8. MOVING OR SPEAKING SO SLOWLY THAT OTHER PEOPLE COULD HAVE NOTICED. OR THE OPPOSITE, BEING SO FIGETY OR RESTLESS THAT YOU HAVE BEEN MOVING AROUND A LOT MORE THAN USUAL: 0
6. FEELING BAD ABOUT YOURSELF - OR THAT YOU ARE A FAILURE OR HAVE LET YOURSELF OR YOUR FAMILY DOWN: 0
SUM OF ALL RESPONSES TO PHQ QUESTIONS 1-9: 4
10. IF YOU CHECKED OFF ANY PROBLEMS, HOW DIFFICULT HAVE THESE PROBLEMS MADE IT FOR YOU TO DO YOUR WORK, TAKE CARE OF THINGS AT HOME, OR GET ALONG WITH OTHER PEOPLE: 1
2. FEELING DOWN, DEPRESSED OR HOPELESS: 2

## 2023-09-25 ASSESSMENT — LIFESTYLE VARIABLES
HOW MANY STANDARD DRINKS CONTAINING ALCOHOL DO YOU HAVE ON A TYPICAL DAY: PATIENT DOES NOT DRINK
HOW OFTEN DO YOU HAVE A DRINK CONTAINING ALCOHOL: NEVER

## 2023-09-25 ASSESSMENT — ENCOUNTER SYMPTOMS: RESPIRATORY NEGATIVE: 1

## 2023-10-02 DIAGNOSIS — M15.9 PRIMARY OSTEOARTHRITIS INVOLVING MULTIPLE JOINTS: ICD-10-CM

## 2023-10-02 RX ORDER — TRAMADOL HYDROCHLORIDE 50 MG/1
50 TABLET ORAL EVERY 8 HOURS PRN
Qty: 90 TABLET | Refills: 5 | Status: SHIPPED | OUTPATIENT
Start: 2023-10-02 | End: 2024-03-30

## 2023-10-02 NOTE — TELEPHONE ENCOUNTER
Contacted [facility]. Advised that they do not have medication and to call another pharmacy. Advised that patient is 80years old and unable to drive and has to rely on others for transportation. Pharmacist stated that they are unsure of when they will get medication and pretty sure it only affects their pharmacy. Advised again to call other pharmacies for medication. Contacted [facility] in [place]. Reports having medication in stock. Order pended. Please advise.

## 2023-10-02 NOTE — TELEPHONE ENCOUNTER
Per message from provider on B12 results on 5/16/23; patient is needing monthly injections for 6 months. Patient is needing at least until November. Patient contacted and advised she will need at least 2 more injections. Voiced understanding. Scheduled lab appointment for 10/10/23. Patient stated that [facility] told her they could not get Tramadol in stock due to \"some reason\". Patient would like to know what to do? Send to [facility]? Advised patient I would contact [facility] to figure out what was going on. Patient voiced thanks.

## 2023-10-10 ENCOUNTER — NURSE ONLY (OUTPATIENT)
Dept: FAMILY MEDICINE CLINIC | Facility: CLINIC | Age: 88
End: 2023-10-10
Payer: MEDICARE

## 2023-10-10 DIAGNOSIS — E53.8 B12 DEFICIENCY: Primary | ICD-10-CM

## 2023-10-10 PROCEDURE — 96372 THER/PROPH/DIAG INJ SC/IM: CPT | Performed by: FAMILY MEDICINE

## 2023-10-10 RX ORDER — CYANOCOBALAMIN 1000 UG/ML
1000 INJECTION, SOLUTION INTRAMUSCULAR; SUBCUTANEOUS ONCE
Status: COMPLETED | OUTPATIENT
Start: 2023-10-10 | End: 2023-10-10

## 2023-10-10 RX ADMIN — CYANOCOBALAMIN 1000 MCG: 1000 INJECTION, SOLUTION INTRAMUSCULAR; SUBCUTANEOUS at 11:40

## 2023-10-17 ENCOUNTER — OFFICE VISIT (OUTPATIENT)
Age: 88
End: 2023-10-17
Payer: MEDICARE

## 2023-10-17 VITALS
WEIGHT: 106.2 LBS | DIASTOLIC BLOOD PRESSURE: 68 MMHG | SYSTOLIC BLOOD PRESSURE: 132 MMHG | BODY MASS INDEX: 19.54 KG/M2 | HEIGHT: 62 IN

## 2023-10-17 DIAGNOSIS — I10 PRIMARY HYPERTENSION: Primary | ICD-10-CM

## 2023-10-17 DIAGNOSIS — I25.10 ATHEROSCLEROSIS OF NATIVE CORONARY ARTERY OF NATIVE HEART WITHOUT ANGINA PECTORIS: ICD-10-CM

## 2023-10-17 DIAGNOSIS — D50.9 IRON DEFICIENCY ANEMIA, UNSPECIFIED IRON DEFICIENCY ANEMIA TYPE: ICD-10-CM

## 2023-10-17 PROCEDURE — G8484 FLU IMMUNIZE NO ADMIN: HCPCS | Performed by: INTERNAL MEDICINE

## 2023-10-17 PROCEDURE — G8420 CALC BMI NORM PARAMETERS: HCPCS | Performed by: INTERNAL MEDICINE

## 2023-10-17 PROCEDURE — 1123F ACP DISCUSS/DSCN MKR DOCD: CPT | Performed by: INTERNAL MEDICINE

## 2023-10-17 PROCEDURE — G8428 CUR MEDS NOT DOCUMENT: HCPCS | Performed by: INTERNAL MEDICINE

## 2023-10-17 PROCEDURE — 99214 OFFICE O/P EST MOD 30 MIN: CPT | Performed by: INTERNAL MEDICINE

## 2023-10-17 PROCEDURE — 1090F PRES/ABSN URINE INCON ASSESS: CPT | Performed by: INTERNAL MEDICINE

## 2023-10-17 PROCEDURE — 1036F TOBACCO NON-USER: CPT | Performed by: INTERNAL MEDICINE

## 2023-10-17 RX ORDER — ENALAPRIL MALEATE AND HYDROCHLOROTHIAZIDE 10; 25 MG/1; MG/1
TABLET ORAL
COMMUNITY
Start: 2023-07-27

## 2023-10-17 NOTE — PROGRESS NOTES
outside records when available. ASSESSMENT:    Roya Johnson was seen today for coronary artery disease, hypertension and irregular heart beat. Diagnoses and all orders for this visit:    Primary hypertension    Atherosclerosis of native coronary artery of native heart without angina pectoris    Iron deficiency anemia, unspecified iron deficiency anemia type          PLAN:       1. HTN: white coat HTN. On Ace/HCTZ. Stop other dose of HCTZ now. Follow K and Cr.   8/2023 labs ok  EF normal.       Follow for now. 2. CAD: EF normal.   Wants on less meds. The patient has been instructed to call with any angina or equivalent as reviewed today. All questions were answered with the patient voicing complete understanding. 3. Fe Def anemia:  on iron and B12 now. Follow. On iron. 4. HPL:  off crestor. Conservative mgmt is best as reviewed. Patient has been instructed and agrees to call our office with any issues or other concerns related to their cardiac condition(s) and/or complaint(s). Return in about 3 months (around 1/17/2024).        Mario Martinez, DO  10/17/2023

## 2023-10-23 ENCOUNTER — OFFICE VISIT (OUTPATIENT)
Dept: FAMILY MEDICINE CLINIC | Facility: CLINIC | Age: 88
End: 2023-10-23
Payer: MEDICARE

## 2023-10-23 VITALS
OXYGEN SATURATION: 98 % | HEART RATE: 69 BPM | DIASTOLIC BLOOD PRESSURE: 80 MMHG | BODY MASS INDEX: 20.17 KG/M2 | SYSTOLIC BLOOD PRESSURE: 132 MMHG | WEIGHT: 109.6 LBS | HEIGHT: 62 IN

## 2023-10-23 DIAGNOSIS — I10 PRIMARY HYPERTENSION: Primary | ICD-10-CM

## 2023-10-23 DIAGNOSIS — D50.0 IRON DEFICIENCY ANEMIA DUE TO CHRONIC BLOOD LOSS: ICD-10-CM

## 2023-10-23 DIAGNOSIS — F34.1 PERSISTENT DEPRESSIVE DISORDER: ICD-10-CM

## 2023-10-23 DIAGNOSIS — E78.00 PURE HYPERCHOLESTEROLEMIA: ICD-10-CM

## 2023-10-23 PROCEDURE — 1090F PRES/ABSN URINE INCON ASSESS: CPT | Performed by: FAMILY MEDICINE

## 2023-10-23 PROCEDURE — G8420 CALC BMI NORM PARAMETERS: HCPCS | Performed by: FAMILY MEDICINE

## 2023-10-23 PROCEDURE — G8427 DOCREV CUR MEDS BY ELIG CLIN: HCPCS | Performed by: FAMILY MEDICINE

## 2023-10-23 PROCEDURE — G8484 FLU IMMUNIZE NO ADMIN: HCPCS | Performed by: FAMILY MEDICINE

## 2023-10-23 PROCEDURE — 1036F TOBACCO NON-USER: CPT | Performed by: FAMILY MEDICINE

## 2023-10-23 PROCEDURE — 99214 OFFICE O/P EST MOD 30 MIN: CPT | Performed by: FAMILY MEDICINE

## 2023-10-23 PROCEDURE — 1123F ACP DISCUSS/DSCN MKR DOCD: CPT | Performed by: FAMILY MEDICINE

## 2023-10-23 ASSESSMENT — ENCOUNTER SYMPTOMS: RESPIRATORY NEGATIVE: 1

## 2023-10-23 NOTE — PROGRESS NOTES
PROGRESS NOTE    SUBJECTIVE:   Baldemar Aden is a 80 y.o. female seen for a follow up visit regarding   Chief Complaint   Patient presents with    Depression     Increased depression; was her wedding anniversary last week. HPI:  Here for follow-up of her chronic problems. She is doing relatively well presently however last week she had a flare of some depressive symptoms, her anniversary with her  who is recently passed, was last week. Doing much better today. Reviewed and updated this visit by provider:  Tobacco  Allergies  Meds  Problems  Med Hx  Surg Hx  Fam Hx           Review of Systems   Respiratory: Negative. Cardiovascular: Negative. OBJECTIVE:  Vitals:    10/23/23 1051   BP: 132/80   Site: Left Upper Arm   Cuff Size: Small Adult   Pulse: 69   SpO2: 98%   Weight: 49.7 kg (109 lb 9.6 oz)   Height: 1.575 m (5' 2.01\")        Physical Exam   General: Alert and oriented x3, frail appearing, level affect presently, appropriate thought content  Neck: No adenopathy, thyromegaly or thyroid nodules  Pulmonary: Normal effort, good airflow, no rales or rhonchi  CVS: Regular rate and rhythm, normal S1, S2, no S3 or S4, no murmurs; no carotid bruits, 2+ pedal pulses      Medical problems and test results were reviewed with the patient today. No results found for this or any previous visit (from the past 672 hour(s)). No results found for any visits on 10/23/23. ASSESSMENT and PLAN    1. Primary hypertension, at goal  2. Persistent depressive disorder, flareup last week with anniversary date, improved presently. 3. Pure hypercholesterolemia, at goal  4. Iron deficiency anemia due to chronic blood loss, improving, continue with iron therapy. Patient presented today with all her pill bottles including her over-the-counter supplements. She had some duplicate bottles which were consolidated.   I have advised her to stop all of her supplements and take a

## 2023-10-24 ENCOUNTER — TELEPHONE (OUTPATIENT)
Dept: FAMILY MEDICINE CLINIC | Facility: CLINIC | Age: 88
End: 2023-10-24

## 2023-10-24 NOTE — TELEPHONE ENCOUNTER
Dennis Saab wants a copy of what Dr. Ruddy Alcantara discontinued on her medications faxed over to them. Dr. Christian Doss.

## 2023-10-30 DIAGNOSIS — E53.8 B12 DEFICIENCY: ICD-10-CM

## 2023-10-30 RX ORDER — CYANOCOBALAMIN 1000 UG/ML
INJECTION, SOLUTION INTRAMUSCULAR; SUBCUTANEOUS
Qty: 3 ML | Refills: 10 | OUTPATIENT
Start: 2023-10-30

## 2023-11-05 ENCOUNTER — PATIENT MESSAGE (OUTPATIENT)
Dept: FAMILY MEDICINE CLINIC | Facility: CLINIC | Age: 88
End: 2023-11-05
Payer: MEDICARE

## 2023-11-05 DIAGNOSIS — R34 DECREASED URINE OUTPUT: Primary | ICD-10-CM

## 2023-11-05 PROCEDURE — 81003 URINALYSIS AUTO W/O SCOPE: CPT | Performed by: FAMILY MEDICINE

## 2023-11-07 NOTE — TELEPHONE ENCOUNTER
From: Armida Quezada  To: Dr. Nettie Miranda: 11/5/2023 6:24 PM EST  Subject: Kidneys Not Functioning normal    Good evening Dr Arabella Gomez,    I have tried drinking hot tea, hot coffee and other beverages with very little, almost NO KIDNEY OUTPUT this weekend. Am I dying? I am worried. Could it be a kidneystone? What can I do? I don't want my kidneys to shut down. I know kidneys shutting down is the first sign of death. ..     [address]     
Lab orders placed.
DISPLAY PLAN FREE TEXT

## 2023-11-09 ENCOUNTER — OFFICE VISIT (OUTPATIENT)
Dept: FAMILY MEDICINE CLINIC | Facility: CLINIC | Age: 88
End: 2023-11-09
Payer: MEDICARE

## 2023-11-09 VITALS
BODY MASS INDEX: 19.36 KG/M2 | OXYGEN SATURATION: 98 % | SYSTOLIC BLOOD PRESSURE: 154 MMHG | DIASTOLIC BLOOD PRESSURE: 68 MMHG | HEIGHT: 62 IN | WEIGHT: 105.2 LBS | HEART RATE: 76 BPM

## 2023-11-09 DIAGNOSIS — I25.10 ATHEROSCLEROSIS OF NATIVE CORONARY ARTERY OF NATIVE HEART WITHOUT ANGINA PECTORIS: ICD-10-CM

## 2023-11-09 DIAGNOSIS — I10 ESSENTIAL HYPERTENSION, BENIGN: ICD-10-CM

## 2023-11-09 DIAGNOSIS — R34 DECREASED URINE OUTPUT: Primary | ICD-10-CM

## 2023-11-09 LAB
BACTERIA URNS QL MICRO: NEGATIVE /HPF
BILIRUBIN, URINE, POC: NEGATIVE
BLOOD URINE, POC: NEGATIVE
CASTS URNS QL MICRO: NORMAL /LPF (ref 0–2)
EPI CELLS #/AREA URNS HPF: NORMAL /HPF (ref 0–5)
GLUCOSE URINE, POC: NEGATIVE
KETONES, URINE, POC: NEGATIVE
LEUKOCYTE ESTERASE, URINE, POC: NEGATIVE
NITRITE, URINE, POC: NEGATIVE
PH, URINE, POC: 5.5 (ref 4.6–8)
PROTEIN,URINE, POC: NEGATIVE
RBC #/AREA URNS HPF: NORMAL /HPF (ref 0–5)
SPECIFIC GRAVITY, URINE, POC: 1.01 (ref 1–1.03)
URINALYSIS CLARITY, POC: CLEAR
URINALYSIS COLOR, POC: YELLOW
UROBILINOGEN, POC: NORMAL
WBC URNS QL MICRO: NORMAL /HPF (ref 0–4)

## 2023-11-09 PROCEDURE — 1123F ACP DISCUSS/DSCN MKR DOCD: CPT | Performed by: FAMILY MEDICINE

## 2023-11-09 PROCEDURE — 99214 OFFICE O/P EST MOD 30 MIN: CPT | Performed by: FAMILY MEDICINE

## 2023-11-09 PROCEDURE — G8427 DOCREV CUR MEDS BY ELIG CLIN: HCPCS | Performed by: FAMILY MEDICINE

## 2023-11-09 PROCEDURE — 1036F TOBACCO NON-USER: CPT | Performed by: FAMILY MEDICINE

## 2023-11-09 PROCEDURE — G8484 FLU IMMUNIZE NO ADMIN: HCPCS | Performed by: FAMILY MEDICINE

## 2023-11-09 PROCEDURE — G8420 CALC BMI NORM PARAMETERS: HCPCS | Performed by: FAMILY MEDICINE

## 2023-11-09 PROCEDURE — 1090F PRES/ABSN URINE INCON ASSESS: CPT | Performed by: FAMILY MEDICINE

## 2023-11-09 ASSESSMENT — PATIENT HEALTH QUESTIONNAIRE - PHQ9
8. MOVING OR SPEAKING SO SLOWLY THAT OTHER PEOPLE COULD HAVE NOTICED. OR THE OPPOSITE, BEING SO FIGETY OR RESTLESS THAT YOU HAVE BEEN MOVING AROUND A LOT MORE THAN USUAL: 0
SUM OF ALL RESPONSES TO PHQ QUESTIONS 1-9: 2
SUM OF ALL RESPONSES TO PHQ QUESTIONS 1-9: 2
6. FEELING BAD ABOUT YOURSELF - OR THAT YOU ARE A FAILURE OR HAVE LET YOURSELF OR YOUR FAMILY DOWN: 0
SUM OF ALL RESPONSES TO PHQ QUESTIONS 1-9: 2
3. TROUBLE FALLING OR STAYING ASLEEP: 0
7. TROUBLE CONCENTRATING ON THINGS, SUCH AS READING THE NEWSPAPER OR WATCHING TELEVISION: 0
9. THOUGHTS THAT YOU WOULD BE BETTER OFF DEAD, OR OF HURTING YOURSELF: 0
10. IF YOU CHECKED OFF ANY PROBLEMS, HOW DIFFICULT HAVE THESE PROBLEMS MADE IT FOR YOU TO DO YOUR WORK, TAKE CARE OF THINGS AT HOME, OR GET ALONG WITH OTHER PEOPLE: 0
SUM OF ALL RESPONSES TO PHQ9 QUESTIONS 1 & 2: 2
SUM OF ALL RESPONSES TO PHQ QUESTIONS 1-9: 2
4. FEELING TIRED OR HAVING LITTLE ENERGY: 0
5. POOR APPETITE OR OVEREATING: 0
2. FEELING DOWN, DEPRESSED OR HOPELESS: 2
1. LITTLE INTEREST OR PLEASURE IN DOING THINGS: 0

## 2023-11-09 ASSESSMENT — COLUMBIA-SUICIDE SEVERITY RATING SCALE - C-SSRS
7. DID THIS OCCUR IN THE LAST THREE MONTHS: NO
5. HAVE YOU STARTED TO WORK OUT OR WORKED OUT THE DETAILS OF HOW TO KILL YOURSELF? DO YOU INTEND TO CARRY OUT THIS PLAN?: NO
4. HAVE YOU HAD THESE THOUGHTS AND HAD SOME INTENTION OF ACTING ON THEM?: NO
3. HAVE YOU BEEN THINKING ABOUT HOW YOU MIGHT KILL YOURSELF?: NO

## 2023-11-09 ASSESSMENT — ENCOUNTER SYMPTOMS: RESPIRATORY NEGATIVE: 1

## 2023-11-09 NOTE — PROGRESS NOTES
PROGRESS NOTE    SUBJECTIVE:   Belen Primrose is a 80 y.o. female seen for a follow up visit regarding   Chief Complaint   Patient presents with    Other     Complains of decreased urine output  Reports yesterday she had fallen twice due to dizziness. Denies any pain just some soreness. Reports having some dizziness today but not as bad as yesterday. HPI:  Patient comes in today expressing concern about possible decreased urinary output. She denies dysuria. She has not seen any blood in her urine. She did not take her blood pressure medication this morning. Reviewed and updated this visit by provider:           Review of Systems   Respiratory: Negative. Cardiovascular: Negative. OBJECTIVE:  Vitals:    11/09/23 1005   BP: (!) 154/68   Site: Left Upper Arm   Cuff Size: Small Adult   Pulse: 76   SpO2: 98%   Weight: 47.7 kg (105 lb 3.2 oz)   Height: 1.575 m (5' 2.01\")        Physical Exam   General: Alert and oriented x3, well-appearing  Neck: No adenopathy, thyromegaly or thyroid nodules  Pulmonary: Normal effort, good airflow, no rales or rhonchi  CVS: Regular rate and rhythm, normal S1, S2, no S3 or S4, no murmurs; no carotid bruits, 2+ pedal pulses  Dip UA today is unremarkable, specific gravity is 1.015    Medical problems and test results were reviewed with the patient today.      Recent Results (from the past 672 hour(s))   AMB POC URINALYSIS DIP STICK AUTO W/O MICRO    Collection Time: 11/09/23 10:00 AM   Result Value Ref Range    Color, Urine, POC Yellow     Clarity, Urine, POC Clear     Glucose, Urine, POC Negative Negative    Bilirubin, Urine, POC Negative Negative    Ketones, Urine, POC Negative Negative    Specific Gravity, Urine, POC 1.015 1.001 - 1.035    Blood, Urine, POC Negative Negative    pH, Urine, POC 5.5 4.6 - 8.0    Protein, Urine, POC Negative Negative    Urobilinogen, POC 0.2 mg/dL     Nitrite, Urine, POC Negative Negative    Leukocyte Esterase, Urine, POC

## 2023-11-10 LAB
ANION GAP SERPL CALC-SCNC: 8 MMOL/L (ref 2–11)
BUN SERPL-MCNC: 11 MG/DL (ref 8–23)
CALCIUM SERPL-MCNC: 9.3 MG/DL (ref 8.3–10.4)
CHLORIDE SERPL-SCNC: 104 MMOL/L (ref 101–110)
CO2 SERPL-SCNC: 26 MMOL/L (ref 21–32)
CREAT SERPL-MCNC: 0.8 MG/DL (ref 0.6–1)
GLUCOSE SERPL-MCNC: 104 MG/DL (ref 65–100)
POTASSIUM SERPL-SCNC: 3.4 MMOL/L (ref 3.5–5.1)
SODIUM SERPL-SCNC: 138 MMOL/L (ref 133–143)

## 2023-11-12 ENCOUNTER — PATIENT MESSAGE (OUTPATIENT)
Dept: FAMILY MEDICINE CLINIC | Facility: CLINIC | Age: 88
End: 2023-11-12

## 2023-11-14 NOTE — TELEPHONE ENCOUNTER
Patient called stating she was still very weak and unsteady. Worried she may have had a stroke. Wanting to know what she can do. Who she needs to see about this. Please advise.

## 2023-11-15 ENCOUNTER — OFFICE VISIT (OUTPATIENT)
Dept: FAMILY MEDICINE CLINIC | Facility: CLINIC | Age: 88
End: 2023-11-15
Payer: MEDICARE

## 2023-11-15 VITALS
BODY MASS INDEX: 19.73 KG/M2 | SYSTOLIC BLOOD PRESSURE: 138 MMHG | WEIGHT: 107.2 LBS | HEIGHT: 62 IN | OXYGEN SATURATION: 97 % | HEART RATE: 65 BPM | DIASTOLIC BLOOD PRESSURE: 68 MMHG

## 2023-11-15 DIAGNOSIS — E53.8 B12 DEFICIENCY: ICD-10-CM

## 2023-11-15 DIAGNOSIS — R26.81 UNSTEADY GAIT: ICD-10-CM

## 2023-11-15 DIAGNOSIS — F33.1 MAJOR DEPRESSIVE DISORDER, RECURRENT, MODERATE (HCC): Primary | ICD-10-CM

## 2023-11-15 PROCEDURE — 1123F ACP DISCUSS/DSCN MKR DOCD: CPT | Performed by: FAMILY MEDICINE

## 2023-11-15 PROCEDURE — G8427 DOCREV CUR MEDS BY ELIG CLIN: HCPCS | Performed by: FAMILY MEDICINE

## 2023-11-15 PROCEDURE — 1090F PRES/ABSN URINE INCON ASSESS: CPT | Performed by: FAMILY MEDICINE

## 2023-11-15 PROCEDURE — G8484 FLU IMMUNIZE NO ADMIN: HCPCS | Performed by: FAMILY MEDICINE

## 2023-11-15 PROCEDURE — G8420 CALC BMI NORM PARAMETERS: HCPCS | Performed by: FAMILY MEDICINE

## 2023-11-15 PROCEDURE — 99213 OFFICE O/P EST LOW 20 MIN: CPT | Performed by: FAMILY MEDICINE

## 2023-11-15 PROCEDURE — 1036F TOBACCO NON-USER: CPT | Performed by: FAMILY MEDICINE

## 2023-11-15 RX ORDER — CYANOCOBALAMIN 1000 UG/ML
1000 INJECTION, SOLUTION INTRAMUSCULAR; SUBCUTANEOUS ONCE
Status: COMPLETED | OUTPATIENT
Start: 2023-11-15 | End: 2023-11-15

## 2023-11-15 RX ADMIN — CYANOCOBALAMIN 1000 MCG: 1000 INJECTION, SOLUTION INTRAMUSCULAR; SUBCUTANEOUS at 14:57

## 2023-11-15 ASSESSMENT — PATIENT HEALTH QUESTIONNAIRE - PHQ9
SUM OF ALL RESPONSES TO PHQ QUESTIONS 1-9: 0
9. THOUGHTS THAT YOU WOULD BE BETTER OFF DEAD, OR OF HURTING YOURSELF: 0
1. LITTLE INTEREST OR PLEASURE IN DOING THINGS: 0
SUM OF ALL RESPONSES TO PHQ QUESTIONS 1-9: 0
7. TROUBLE CONCENTRATING ON THINGS, SUCH AS READING THE NEWSPAPER OR WATCHING TELEVISION: 0
3. TROUBLE FALLING OR STAYING ASLEEP: 0
5. POOR APPETITE OR OVEREATING: 0
4. FEELING TIRED OR HAVING LITTLE ENERGY: 0
SUM OF ALL RESPONSES TO PHQ QUESTIONS 1-9: 0
SUM OF ALL RESPONSES TO PHQ9 QUESTIONS 1 & 2: 0
SUM OF ALL RESPONSES TO PHQ QUESTIONS 1-9: 0
2. FEELING DOWN, DEPRESSED OR HOPELESS: 0
6. FEELING BAD ABOUT YOURSELF - OR THAT YOU ARE A FAILURE OR HAVE LET YOURSELF OR YOUR FAMILY DOWN: 0
10. IF YOU CHECKED OFF ANY PROBLEMS, HOW DIFFICULT HAVE THESE PROBLEMS MADE IT FOR YOU TO DO YOUR WORK, TAKE CARE OF THINGS AT HOME, OR GET ALONG WITH OTHER PEOPLE: 0
8. MOVING OR SPEAKING SO SLOWLY THAT OTHER PEOPLE COULD HAVE NOTICED. OR THE OPPOSITE, BEING SO FIGETY OR RESTLESS THAT YOU HAVE BEEN MOVING AROUND A LOT MORE THAN USUAL: 0

## 2023-11-15 ASSESSMENT — COLUMBIA-SUICIDE SEVERITY RATING SCALE - C-SSRS
7. DID THIS OCCUR IN THE LAST THREE MONTHS: NO
4. HAVE YOU HAD THESE THOUGHTS AND HAD SOME INTENTION OF ACTING ON THEM?: NO
3. HAVE YOU BEEN THINKING ABOUT HOW YOU MIGHT KILL YOURSELF?: NO
5. HAVE YOU STARTED TO WORK OUT OR WORKED OUT THE DETAILS OF HOW TO KILL YOURSELF? DO YOU INTEND TO CARRY OUT THIS PLAN?: NO

## 2023-11-15 ASSESSMENT — ENCOUNTER SYMPTOMS: RESPIRATORY NEGATIVE: 1

## 2023-11-15 NOTE — PROGRESS NOTES
PROGRESS NOTE    SUBJECTIVE:   Martha Sheehan is a 80 y.o. female seen for a follow up visit regarding   Chief Complaint   Patient presents with    Fatigue     Complains of weakness, dizziness, swimmy headed, fatigued. Had a fall over the weekend and hit her head. Believes she may have had a stroke. HPI:  Patient is here today reporting that she continues to feel weak. She is concerned that she may have had a stroke or something going on with her brain. She had a CT study of her brain less than 2 months ago which was unremarkable except changes of aging. Recent blood work is unremarkable, hemoglobin is increasing nicely. Patient is anxious, she is approaching the 1 year anniversary of the death of her  and openly acknowledges that this is having an impact on her emotions. Reviewed and updated this visit by provider:           Review of Systems   Respiratory: Negative. Cardiovascular: Negative. OBJECTIVE:  Vitals:    11/15/23 1450   BP: 138/68   Site: Left Upper Arm   Cuff Size: Small Adult   Pulse: 65   SpO2: 97%   Weight: 48.6 kg (107 lb 3.2 oz)   Height: 1.575 m (5' 2.01\")        Physical Exam   General: Alert and oriented x3, frail-appearing  Neck: No adenopathy, thyromegaly or thyroid nodules  Pulmonary: Normal effort, good airflow, no rales or rhonchi  CVS: Regular rate and rhythm, normal S1, S2, no S3 or S4, no murmurs; no carotid bruits, 2+ pedal pulses    Medical problems and test results were reviewed with the patient today.      Recent Results (from the past 672 hour(s))   AMB POC URINALYSIS DIP STICK AUTO W/O MICRO    Collection Time: 11/09/23 10:00 AM   Result Value Ref Range    Color, Urine, POC Yellow     Clarity, Urine, POC Clear     Glucose, Urine, POC Negative Negative    Bilirubin, Urine, POC Negative Negative    Ketones, Urine, POC Negative Negative    Specific Gravity, Urine, POC 1.015 1.001 - 1.035    Blood, Urine, POC Negative Negative    pH, Urine, POC

## 2023-11-16 ENCOUNTER — HOME HEALTH ADMISSION (OUTPATIENT)
Dept: HOME HEALTH SERVICES | Facility: HOME HEALTH | Age: 88
End: 2023-11-16
Payer: MEDICARE

## 2023-11-16 ENCOUNTER — TELEPHONE (OUTPATIENT)
Dept: FAMILY MEDICINE CLINIC | Facility: CLINIC | Age: 88
End: 2023-11-16

## 2023-11-16 DIAGNOSIS — R26.81 UNSTEADY GAIT: Primary | ICD-10-CM

## 2023-11-16 DIAGNOSIS — R29.6 FREQUENT FALLS: ICD-10-CM

## 2023-11-16 NOTE — TELEPHONE ENCOUNTER
----- Message from Fatimah Saez MD sent at 11/15/2023  8:09 PM EST -----  Please request home health consultation for physical therapy for unsteady gait, frequent falls, frailty. Eval home for fall risks, treat as indicated. I am not sure if this is something that palliative care can offer, maybe call [facility]?

## 2023-11-16 NOTE — PROGRESS NOTES
PROGRESS NOTE    SUBJECTIVE:   Daniel Grullon is a 80 y.o. female seen for a follow up visit regarding   Chief Complaint   Patient presents with    Fatigue     Complains of weakness, dizziness, swimmy headed, fatigued. Had a fall over the weekend and hit her head. Believes she may have had a stroke. HPI:  HPI     Reviewed and updated this visit by provider:           Review of Systems       OBJECTIVE:  Vitals:    11/15/23 1450   BP: 138/68   Site: Left Upper Arm   Cuff Size: Small Adult   Pulse: 65   SpO2: 97%   Weight: 48.6 kg (107 lb 3.2 oz)   Height: 1.575 m (5' 2.01\")        Physical Exam     Medical problems and test results were reviewed with the patient today.      Recent Results (from the past 672 hour(s))   AMB POC URINALYSIS DIP STICK AUTO W/O MICRO    Collection Time: 11/09/23 10:00 AM   Result Value Ref Range    Color, Urine, POC Yellow     Clarity, Urine, POC Clear     Glucose, Urine, POC Negative Negative    Bilirubin, Urine, POC Negative Negative    Ketones, Urine, POC Negative Negative    Specific Gravity, Urine, POC 1.015 1.001 - 1.035    Blood, Urine, POC Negative Negative    pH, Urine, POC 5.5 4.6 - 8.0    Protein, Urine, POC Negative Negative    Urobilinogen, POC 0.2 mg/dL     Nitrite, Urine, POC Negative Negative    Leukocyte Esterase, Urine, POC Negative Negative   Basic Metabolic Panel    Collection Time: 11/09/23 10:24 AM   Result Value Ref Range    Sodium 138 133 - 143 mmol/L    Potassium 3.4 (L) 3.5 - 5.1 mmol/L    Chloride 104 101 - 110 mmol/L    CO2 26 21 - 32 mmol/L    Anion Gap 8 2 - 11 mmol/L    Glucose 104 (H) 65 - 100 mg/dL    BUN 11 8 - 23 MG/DL    Creatinine 0.80 0.6 - 1.0 MG/DL    Est, Glom Filt Rate >60 >60 ml/min/1.73m2    Calcium 9.3 8.3 - 10.4 MG/DL   Urinalysis, Micro    Collection Time: 11/09/23 10:24 AM   Result Value Ref Range    WBC, UA 0-4 0 - 4 /hpf    RBC, UA 0-5 0 - 5 /hpf    Epithelial Cells UA 0-5 0 - 5 /hpf    BACTERIA, URINE Negative Negative /hpf    Casts

## 2023-11-27 ENCOUNTER — TELEPHONE (OUTPATIENT)
Dept: FAMILY MEDICINE CLINIC | Facility: CLINIC | Age: 88
End: 2023-11-27

## 2023-11-27 ENCOUNTER — HOME CARE VISIT (OUTPATIENT)
Dept: SCHEDULING | Facility: HOME HEALTH | Age: 88
End: 2023-11-27

## 2023-11-27 VITALS
OXYGEN SATURATION: 97 % | RESPIRATION RATE: 18 BRPM | DIASTOLIC BLOOD PRESSURE: 72 MMHG | HEART RATE: 67 BPM | SYSTOLIC BLOOD PRESSURE: 130 MMHG | TEMPERATURE: 98.1 F

## 2023-11-27 PROCEDURE — G0299 HHS/HOSPICE OF RN EA 15 MIN: HCPCS

## 2023-11-27 PROCEDURE — 0221000100 HH NO PAY CLAIM PROCEDURE

## 2023-11-27 ASSESSMENT — ENCOUNTER SYMPTOMS
STOOL DESCRIPTION: SOFT
DYSPNEA ACTIVITY LEVEL: AFTER AMBULATING 10 - 20 FT
PAIN LOCATION - PAIN QUALITY: ACHE

## 2023-11-27 NOTE — TELEPHONE ENCOUNTER
[address] [facility] [phone] called to get verbal orders for assessment and education for nursing visits also a major potential interaction between lexapro and tramadol

## 2023-11-30 ENCOUNTER — HOME CARE VISIT (OUTPATIENT)
Dept: SCHEDULING | Facility: HOME HEALTH | Age: 88
End: 2023-11-30

## 2023-11-30 VITALS
SYSTOLIC BLOOD PRESSURE: 136 MMHG | TEMPERATURE: 98 F | DIASTOLIC BLOOD PRESSURE: 72 MMHG | HEART RATE: 67 BPM | RESPIRATION RATE: 18 BRPM | OXYGEN SATURATION: 96 %

## 2023-11-30 VITALS
HEART RATE: 66 BPM | DIASTOLIC BLOOD PRESSURE: 72 MMHG | TEMPERATURE: 98 F | OXYGEN SATURATION: 96 % | RESPIRATION RATE: 16 BRPM | SYSTOLIC BLOOD PRESSURE: 138 MMHG

## 2023-11-30 PROCEDURE — G0151 HHCP-SERV OF PT,EA 15 MIN: HCPCS

## 2023-11-30 PROCEDURE — G0299 HHS/HOSPICE OF RN EA 15 MIN: HCPCS

## 2023-11-30 ASSESSMENT — ENCOUNTER SYMPTOMS: PAIN LOCATION - PAIN QUALITY: ACHING

## 2023-12-04 ENCOUNTER — HOME CARE VISIT (OUTPATIENT)
Dept: SCHEDULING | Facility: HOME HEALTH | Age: 88
End: 2023-12-04
Payer: MEDICARE

## 2023-12-04 PROCEDURE — G0299 HHS/HOSPICE OF RN EA 15 MIN: HCPCS

## 2023-12-05 VITALS
RESPIRATION RATE: 18 BRPM | SYSTOLIC BLOOD PRESSURE: 140 MMHG | OXYGEN SATURATION: 98 % | TEMPERATURE: 97.8 F | DIASTOLIC BLOOD PRESSURE: 62 MMHG | HEART RATE: 68 BPM

## 2023-12-05 ASSESSMENT — ENCOUNTER SYMPTOMS: DYSPNEA ACTIVITY LEVEL: AFTER AMBULATING MORE THAN 20 FT

## 2023-12-06 ENCOUNTER — HOME CARE VISIT (OUTPATIENT)
Dept: SCHEDULING | Facility: HOME HEALTH | Age: 88
End: 2023-12-06
Payer: MEDICARE

## 2023-12-06 VITALS
SYSTOLIC BLOOD PRESSURE: 136 MMHG | DIASTOLIC BLOOD PRESSURE: 70 MMHG | TEMPERATURE: 97.8 F | RESPIRATION RATE: 18 BRPM | HEART RATE: 68 BPM | OXYGEN SATURATION: 96 %

## 2023-12-06 PROCEDURE — G0157 HHC PT ASSISTANT EA 15: HCPCS

## 2023-12-07 ENCOUNTER — HOME CARE VISIT (OUTPATIENT)
Dept: SCHEDULING | Facility: HOME HEALTH | Age: 88
End: 2023-12-07
Payer: MEDICARE

## 2023-12-07 VITALS
DIASTOLIC BLOOD PRESSURE: 70 MMHG | SYSTOLIC BLOOD PRESSURE: 135 MMHG | TEMPERATURE: 98 F | OXYGEN SATURATION: 98 % | RESPIRATION RATE: 18 BRPM | HEART RATE: 77 BPM

## 2023-12-07 PROCEDURE — G0299 HHS/HOSPICE OF RN EA 15 MIN: HCPCS

## 2023-12-08 ENCOUNTER — HOME CARE VISIT (OUTPATIENT)
Dept: SCHEDULING | Facility: HOME HEALTH | Age: 88
End: 2023-12-08
Payer: MEDICARE

## 2023-12-08 VITALS
TEMPERATURE: 98.2 F | DIASTOLIC BLOOD PRESSURE: 62 MMHG | SYSTOLIC BLOOD PRESSURE: 118 MMHG | HEART RATE: 72 BPM | OXYGEN SATURATION: 96 % | RESPIRATION RATE: 18 BRPM

## 2023-12-08 PROCEDURE — G0157 HHC PT ASSISTANT EA 15: HCPCS

## 2023-12-12 ENCOUNTER — HOME CARE VISIT (OUTPATIENT)
Dept: SCHEDULING | Facility: HOME HEALTH | Age: 88
End: 2023-12-12
Payer: MEDICARE

## 2023-12-12 VITALS
OXYGEN SATURATION: 98 % | HEART RATE: 64 BPM | TEMPERATURE: 97.3 F | SYSTOLIC BLOOD PRESSURE: 122 MMHG | DIASTOLIC BLOOD PRESSURE: 70 MMHG | RESPIRATION RATE: 18 BRPM

## 2023-12-12 PROCEDURE — G0157 HHC PT ASSISTANT EA 15: HCPCS

## 2023-12-12 PROCEDURE — G0299 HHS/HOSPICE OF RN EA 15 MIN: HCPCS

## 2023-12-13 VITALS
DIASTOLIC BLOOD PRESSURE: 60 MMHG | SYSTOLIC BLOOD PRESSURE: 130 MMHG | HEART RATE: 73 BPM | RESPIRATION RATE: 18 BRPM | OXYGEN SATURATION: 97 % | TEMPERATURE: 99.3 F

## 2023-12-13 ASSESSMENT — ENCOUNTER SYMPTOMS
DYSPNEA ACTIVITY LEVEL: AFTER AMBULATING 10 - 20 FT
PAIN LOCATION - PAIN QUALITY: SHARP, THROBBING

## 2023-12-14 ENCOUNTER — HOME CARE VISIT (OUTPATIENT)
Dept: SCHEDULING | Facility: HOME HEALTH | Age: 88
End: 2023-12-14
Payer: MEDICARE

## 2023-12-14 VITALS
TEMPERATURE: 97.5 F | SYSTOLIC BLOOD PRESSURE: 116 MMHG | DIASTOLIC BLOOD PRESSURE: 72 MMHG | RESPIRATION RATE: 18 BRPM | HEART RATE: 60 BPM | OXYGEN SATURATION: 97 %

## 2023-12-14 PROCEDURE — G0157 HHC PT ASSISTANT EA 15: HCPCS

## 2023-12-26 ENCOUNTER — HOME CARE VISIT (OUTPATIENT)
Dept: SCHEDULING | Facility: HOME HEALTH | Age: 88
End: 2023-12-26
Payer: MEDICARE

## 2023-12-26 VITALS
SYSTOLIC BLOOD PRESSURE: 130 MMHG | DIASTOLIC BLOOD PRESSURE: 58 MMHG | RESPIRATION RATE: 18 BRPM | TEMPERATURE: 98.2 F | HEART RATE: 72 BPM | OXYGEN SATURATION: 96 %

## 2023-12-26 PROCEDURE — G0299 HHS/HOSPICE OF RN EA 15 MIN: HCPCS

## 2024-01-02 ENCOUNTER — HOME CARE VISIT (OUTPATIENT)
Dept: SCHEDULING | Facility: HOME HEALTH | Age: 89
End: 2024-01-02
Payer: MEDICARE

## 2024-01-02 VITALS
TEMPERATURE: 97.8 F | HEART RATE: 77 BPM | SYSTOLIC BLOOD PRESSURE: 142 MMHG | DIASTOLIC BLOOD PRESSURE: 60 MMHG | RESPIRATION RATE: 18 BRPM | OXYGEN SATURATION: 92 %

## 2024-01-02 PROCEDURE — G0299 HHS/HOSPICE OF RN EA 15 MIN: HCPCS

## 2024-01-02 ASSESSMENT — ENCOUNTER SYMPTOMS
PAIN LOCATION - PAIN QUALITY: ACHING
DYSPNEA ACTIVITY LEVEL: AFTER AMBULATING MORE THAN 20 FT

## 2024-01-09 ENCOUNTER — HOME CARE VISIT (OUTPATIENT)
Dept: SCHEDULING | Facility: HOME HEALTH | Age: 89
End: 2024-01-09
Payer: MEDICARE

## 2024-01-09 PROCEDURE — G0299 HHS/HOSPICE OF RN EA 15 MIN: HCPCS

## 2024-01-11 VITALS
TEMPERATURE: 97.7 F | SYSTOLIC BLOOD PRESSURE: 118 MMHG | HEART RATE: 64 BPM | RESPIRATION RATE: 18 BRPM | OXYGEN SATURATION: 96 % | DIASTOLIC BLOOD PRESSURE: 64 MMHG

## 2024-01-11 ASSESSMENT — ENCOUNTER SYMPTOMS
HEMOPTYSIS: 0
DYSPNEA ACTIVITY LEVEL: AFTER AMBULATING MORE THAN 20 FT

## 2024-01-17 ENCOUNTER — OFFICE VISIT (OUTPATIENT)
Age: 89
End: 2024-01-17
Payer: MEDICARE

## 2024-01-17 VITALS
HEIGHT: 62 IN | DIASTOLIC BLOOD PRESSURE: 70 MMHG | SYSTOLIC BLOOD PRESSURE: 130 MMHG | BODY MASS INDEX: 19.69 KG/M2 | HEART RATE: 62 BPM | WEIGHT: 107 LBS

## 2024-01-17 DIAGNOSIS — G45.9 TIA (TRANSIENT ISCHEMIC ATTACK): ICD-10-CM

## 2024-01-17 DIAGNOSIS — R00.2 PALPITATIONS: ICD-10-CM

## 2024-01-17 DIAGNOSIS — E78.00 PURE HYPERCHOLESTEROLEMIA: ICD-10-CM

## 2024-01-17 DIAGNOSIS — I49.1 SUPRAVENTRICULAR BEAT, PREMATURE: ICD-10-CM

## 2024-01-17 DIAGNOSIS — I25.10 ATHEROSCLEROSIS OF NATIVE CORONARY ARTERY OF NATIVE HEART WITHOUT ANGINA PECTORIS: Primary | ICD-10-CM

## 2024-01-17 DIAGNOSIS — I10 ESSENTIAL HYPERTENSION, BENIGN: ICD-10-CM

## 2024-01-17 PROCEDURE — 1036F TOBACCO NON-USER: CPT | Performed by: INTERNAL MEDICINE

## 2024-01-17 PROCEDURE — G8428 CUR MEDS NOT DOCUMENT: HCPCS | Performed by: INTERNAL MEDICINE

## 2024-01-17 PROCEDURE — 1090F PRES/ABSN URINE INCON ASSESS: CPT | Performed by: INTERNAL MEDICINE

## 2024-01-17 PROCEDURE — 93000 ELECTROCARDIOGRAM COMPLETE: CPT | Performed by: INTERNAL MEDICINE

## 2024-01-17 PROCEDURE — G8484 FLU IMMUNIZE NO ADMIN: HCPCS | Performed by: INTERNAL MEDICINE

## 2024-01-17 PROCEDURE — 99214 OFFICE O/P EST MOD 30 MIN: CPT | Performed by: INTERNAL MEDICINE

## 2024-01-17 PROCEDURE — 1123F ACP DISCUSS/DSCN MKR DOCD: CPT | Performed by: INTERNAL MEDICINE

## 2024-01-17 PROCEDURE — G8420 CALC BMI NORM PARAMETERS: HCPCS | Performed by: INTERNAL MEDICINE

## 2024-01-17 RX ORDER — CYANOCOBALAMIN (VITAMIN B-12) 1000 MCG
1000 TABLET, EXTENDED RELEASE ORAL DAILY
COMMUNITY

## 2024-01-17 NOTE — PROGRESS NOTES
2 [facility], [address] [place] [address]  PHONE: [phone]     24    NAME:  Ashley Campbell  : 1932  MRN: [id number]       SUBJECTIVE:   Ashley Campbell is a 89 y.o. female seen for a follow up visit regarding the following:     Chief Complaint   Patient presents with    Coronary Artery Disease       HPI: Here for CAD.    [facility] 2019:   1.  Normal left ventricular function.   2.  Minor coronary artery disease.  At the junction of the middle and distal left anterior descending segments there is focal kinking of the vessel which could be a potential source of angina.  Echo 3/2023: normal EF      since 2022.    More LBP issues and hip issues.  Still living alone, performing ADLs ok.  Some rare Cps.  Ginger Ale helps her though.  Some CARCAMO.  No edema.    Patient denies recent history of orthopnea, PND, excessive dizziness and/or syncope.              Past Medical History, Past Surgical History, Family history, Social History, and Medications were all reviewed with the patient today and updated as necessary.     Current Outpatient Medications   Medication Sig Dispense Refill    Cyanocobalamin (VITAMIN B-12) 1000 MCG extended release tablet Take 1 tablet by mouth daily      diphenhydrAMINE-phenylephrine (BENADRYL ALLERGY CON ULTRATABS) 25-10 MG TABS Take 2 capsules by mouth daily as needed (nasal congestion).      Multiple Vitamins-Minerals (CENTRUM SILVER ADULT 50+) TABS Take 1 tablet by mouth daily      Ascorbic Acid (VITAMIN C) 1000 MG tablet Take 1 tablet by mouth daily      traMADol (ULTRAM) 50 MG tablet Take 1 tablet by mouth every 8 hours as needed for Pain for up to 180 days. Max Daily Amount: 150 mg 90 tablet 5    escitalopram (LEXAPRO) 20 MG tablet Take 1 tablet by mouth daily 90 tablet 1    dicyclomine (BENTYL) 10 MG capsule Take 1 capsule by mouth 3 times daily (Patient taking differently: Take 1 capsule by mouth 2 times daily) 120 capsule 5    hydroCHLOROthiazide

## 2024-02-09 ENCOUNTER — OFFICE VISIT (OUTPATIENT)
Dept: FAMILY MEDICINE CLINIC | Facility: CLINIC | Age: 89
End: 2024-02-09
Payer: MEDICARE

## 2024-02-09 VITALS
OXYGEN SATURATION: 97 % | HEIGHT: 62 IN | HEART RATE: 70 BPM | SYSTOLIC BLOOD PRESSURE: 118 MMHG | BODY MASS INDEX: 19.77 KG/M2 | WEIGHT: 107.4 LBS | DIASTOLIC BLOOD PRESSURE: 84 MMHG

## 2024-02-09 DIAGNOSIS — S91.209A AVULSION OF TOENAIL, INITIAL ENCOUNTER: Primary | ICD-10-CM

## 2024-02-09 DIAGNOSIS — E53.8 B12 DEFICIENCY: ICD-10-CM

## 2024-02-09 DIAGNOSIS — F33.0 MAJOR DEPRESSIVE DISORDER, RECURRENT, MILD (HCC): ICD-10-CM

## 2024-02-09 DIAGNOSIS — D50.0 IRON DEFICIENCY ANEMIA DUE TO CHRONIC BLOOD LOSS: ICD-10-CM

## 2024-02-09 DIAGNOSIS — E87.6 HYPOKALEMIA: ICD-10-CM

## 2024-02-09 DIAGNOSIS — I10 PRIMARY HYPERTENSION: ICD-10-CM

## 2024-02-09 DIAGNOSIS — M15.9 PRIMARY OSTEOARTHRITIS INVOLVING MULTIPLE JOINTS: ICD-10-CM

## 2024-02-09 DIAGNOSIS — K22.70 BARRETT'S ESOPHAGUS WITHOUT DYSPLASIA: ICD-10-CM

## 2024-02-09 DIAGNOSIS — F33.1 MAJOR DEPRESSIVE DISORDER, RECURRENT, MODERATE (HCC): ICD-10-CM

## 2024-02-09 LAB
BASOPHILS # BLD: 0 K/UL (ref 0–0.2)
BASOPHILS NFR BLD: 0 % (ref 0–2)
DIFFERENTIAL METHOD BLD: ABNORMAL
EOSINOPHIL # BLD: 0 K/UL (ref 0–0.8)
EOSINOPHIL NFR BLD: 0 % (ref 0.5–7.8)
ERYTHROCYTE [DISTWIDTH] IN BLOOD BY AUTOMATED COUNT: 14.1 % (ref 11.9–14.6)
HCT VFR BLD AUTO: 40.3 % (ref 35.8–46.3)
HGB BLD-MCNC: 13 G/DL (ref 11.7–15.4)
IMM GRANULOCYTES # BLD AUTO: 0 K/UL (ref 0–0.5)
IMM GRANULOCYTES NFR BLD AUTO: 0 % (ref 0–5)
LYMPHOCYTES # BLD: 1.7 K/UL (ref 0.5–4.6)
LYMPHOCYTES NFR BLD: 24 % (ref 13–44)
MCH RBC QN AUTO: 31.2 PG (ref 26.1–32.9)
MCHC RBC AUTO-ENTMCNC: 32.3 G/DL (ref 31.4–35)
MCV RBC AUTO: 96.6 FL (ref 82–102)
MONOCYTES # BLD: 0.5 K/UL (ref 0.1–1.3)
MONOCYTES NFR BLD: 7 % (ref 4–12)
NEUTS SEG # BLD: 4.8 K/UL (ref 1.7–8.2)
NEUTS SEG NFR BLD: 69 % (ref 43–78)
NRBC # BLD: 0 K/UL (ref 0–0.2)
PLATELET # BLD AUTO: 242 K/UL (ref 150–450)
PMV BLD AUTO: 8.8 FL (ref 9.4–12.3)
RBC # BLD AUTO: 4.17 M/UL (ref 4.05–5.2)
WBC # BLD AUTO: 7.1 K/UL (ref 4.3–11.1)

## 2024-02-09 PROCEDURE — 1036F TOBACCO NON-USER: CPT | Performed by: FAMILY MEDICINE

## 2024-02-09 PROCEDURE — 1090F PRES/ABSN URINE INCON ASSESS: CPT | Performed by: FAMILY MEDICINE

## 2024-02-09 PROCEDURE — 1123F ACP DISCUSS/DSCN MKR DOCD: CPT | Performed by: FAMILY MEDICINE

## 2024-02-09 PROCEDURE — G8420 CALC BMI NORM PARAMETERS: HCPCS | Performed by: FAMILY MEDICINE

## 2024-02-09 PROCEDURE — 99214 OFFICE O/P EST MOD 30 MIN: CPT | Performed by: FAMILY MEDICINE

## 2024-02-09 PROCEDURE — G8427 DOCREV CUR MEDS BY ELIG CLIN: HCPCS | Performed by: FAMILY MEDICINE

## 2024-02-09 PROCEDURE — 96372 THER/PROPH/DIAG INJ SC/IM: CPT | Performed by: FAMILY MEDICINE

## 2024-02-09 PROCEDURE — G8484 FLU IMMUNIZE NO ADMIN: HCPCS | Performed by: FAMILY MEDICINE

## 2024-02-09 RX ORDER — HYDROCHLOROTHIAZIDE 12.5 MG/1
12.5 CAPSULE, GELATIN COATED ORAL EVERY MORNING
Qty: 90 CAPSULE | Refills: 1 | Status: SHIPPED | OUTPATIENT
Start: 2024-02-09 | End: 2024-08-07

## 2024-02-09 RX ORDER — TRAMADOL HYDROCHLORIDE 50 MG/1
50 TABLET ORAL EVERY 8 HOURS PRN
Qty: 90 TABLET | Refills: 5 | Status: SHIPPED | OUTPATIENT
Start: 2024-02-09 | End: 2024-08-07

## 2024-02-09 RX ORDER — OMEPRAZOLE 40 MG/1
40 CAPSULE, DELAYED RELEASE ORAL DAILY
Qty: 90 CAPSULE | Refills: 3 | Status: SHIPPED | OUTPATIENT
Start: 2024-02-09 | End: 2025-02-08

## 2024-02-09 RX ORDER — ESCITALOPRAM OXALATE 20 MG/1
20 TABLET ORAL DAILY
Qty: 90 TABLET | Refills: 1 | Status: SHIPPED | OUTPATIENT
Start: 2024-02-09 | End: 2024-08-07

## 2024-02-09 RX ORDER — CYANOCOBALAMIN 1000 UG/ML
1000 INJECTION, SOLUTION INTRAMUSCULAR; SUBCUTANEOUS ONCE
Status: COMPLETED | OUTPATIENT
Start: 2024-02-09 | End: 2024-02-09

## 2024-02-09 RX ADMIN — CYANOCOBALAMIN 1000 MCG: 1000 INJECTION, SOLUTION INTRAMUSCULAR; SUBCUTANEOUS at 11:49

## 2024-02-09 ASSESSMENT — PATIENT HEALTH QUESTIONNAIRE - PHQ9
2. FEELING DOWN, DEPRESSED OR HOPELESS: 2
SUM OF ALL RESPONSES TO PHQ QUESTIONS 1-9: 2
SUM OF ALL RESPONSES TO PHQ QUESTIONS 1-9: 2
10. IF YOU CHECKED OFF ANY PROBLEMS, HOW DIFFICULT HAVE THESE PROBLEMS MADE IT FOR YOU TO DO YOUR WORK, TAKE CARE OF THINGS AT HOME, OR GET ALONG WITH OTHER PEOPLE: 0
7. TROUBLE CONCENTRATING ON THINGS, SUCH AS READING THE NEWSPAPER OR WATCHING TELEVISION: 0
8. MOVING OR SPEAKING SO SLOWLY THAT OTHER PEOPLE COULD HAVE NOTICED. OR THE OPPOSITE, BEING SO FIGETY OR RESTLESS THAT YOU HAVE BEEN MOVING AROUND A LOT MORE THAN USUAL: 0
SUM OF ALL RESPONSES TO PHQ9 QUESTIONS 1 & 2: 2
SUM OF ALL RESPONSES TO PHQ QUESTIONS 1-9: 2
1. LITTLE INTEREST OR PLEASURE IN DOING THINGS: 0
3. TROUBLE FALLING OR STAYING ASLEEP: 0
9. THOUGHTS THAT YOU WOULD BE BETTER OFF DEAD, OR OF HURTING YOURSELF: 0
4. FEELING TIRED OR HAVING LITTLE ENERGY: 0
5. POOR APPETITE OR OVEREATING: 0
SUM OF ALL RESPONSES TO PHQ QUESTIONS 1-9: 2
6. FEELING BAD ABOUT YOURSELF - OR THAT YOU ARE A FAILURE OR HAVE LET YOURSELF OR YOUR FAMILY DOWN: 0

## 2024-02-09 ASSESSMENT — ENCOUNTER SYMPTOMS: RESPIRATORY NEGATIVE: 1

## 2024-02-09 NOTE — PROGRESS NOTES
PROGRESS NOTE    SUBJECTIVE:   Ashley Campbell is a 89 y.o. female seen for a follow up visit regarding   Chief Complaint   Patient presents with    Hypertension     Follow up    Nail Problem     Complains of discoloration in right great toe; having some pain        HPI:  Here today for checkup.  She reports that she has been having some pain in her right great toenail.  She is not aware of trauma.         Reviewed and updated this visit by provider:  Tobacco  Allergies  Meds  Problems  Med Hx  Surg Hx  Fam Hx               .  Fever  Horrible                                    Review of Systems   Respiratory: Negative.     Cardiovascular: Negative.           OBJECTIVE:  Vitals:    02/09/24 1030   BP: 118/84   Site: Left Upper Arm   Cuff Size: Small Adult   Pulse: 70   SpO2: 97%   Weight: 48.7 kg (107 lb 6.4 oz)   Height: 1.575 m (5' 2.01\")        Physical Exam   General: Alert and oriented x3, well-appearing  Neck: No adenopathy, thyromegaly or thyroid nodules  Pulmonary: Normal effort, good airflow, no rales or rhonchi  CVS: Regular rate and rhythm, normal S1, S2, no S3 or S4, no murmurs; no carotid bruits, 2+ pedal pulses  Extremities: Right great toenail demonstrates some subungual ecchymoses, partial separation distally.    Medical problems and test results were reviewed with the patient today.     No results found for this or any previous visit (from the past 672 hour(s)).    No results found for any visits on 02/09/24.     ASSESSMENT and PLAN    1. Avulsion of toenail, initial encounter   -    This needs to be completed, patient will return next week for toenail removal  2. Major depressive disorder, recurrent, mild  -     escitalopram (LEXAPRO) 20 MG tablet; Take 1 tablet by mouth daily, Disp-90 tablet, R-1Normal  3. Primary hypertension, controlled  Overview:  Last Assessment & Plan:   Formatting of this note might be different from the original.  Patient's cardiologist made note at her last office

## 2024-02-10 LAB
ANION GAP SERPL CALC-SCNC: 5 MMOL/L (ref 2–11)
BUN SERPL-MCNC: 12 MG/DL (ref 8–23)
CALCIUM SERPL-MCNC: 9.7 MG/DL (ref 8.3–10.4)
CHLORIDE SERPL-SCNC: 105 MMOL/L (ref 103–113)
CO2 SERPL-SCNC: 28 MMOL/L (ref 21–32)
CREAT SERPL-MCNC: 0.6 MG/DL (ref 0.6–1)
GLUCOSE SERPL-MCNC: 91 MG/DL (ref 65–100)
IRON SERPL-MCNC: 105 UG/DL (ref 35–150)
POTASSIUM SERPL-SCNC: 4 MMOL/L (ref 3.5–5.1)
SODIUM SERPL-SCNC: 138 MMOL/L (ref 136–146)
TIBC SERPL-MCNC: 336 UG/DL (ref 250–450)
VIT B12 SERPL-MCNC: 567 PG/ML (ref 193–986)

## 2024-02-29 ENCOUNTER — PROCEDURE VISIT (OUTPATIENT)
Dept: FAMILY MEDICINE CLINIC | Facility: CLINIC | Age: 89
End: 2024-02-29
Payer: MEDICARE

## 2024-02-29 VITALS
DIASTOLIC BLOOD PRESSURE: 80 MMHG | BODY MASS INDEX: 19.8 KG/M2 | HEART RATE: 72 BPM | OXYGEN SATURATION: 98 % | HEIGHT: 62 IN | SYSTOLIC BLOOD PRESSURE: 132 MMHG | WEIGHT: 107.6 LBS

## 2024-02-29 DIAGNOSIS — I10 PRIMARY HYPERTENSION: ICD-10-CM

## 2024-02-29 DIAGNOSIS — L60.8 ACQUIRED DYSMORPHIC TOENAIL: Primary | ICD-10-CM

## 2024-02-29 DIAGNOSIS — R30.0 DYSURIA: ICD-10-CM

## 2024-02-29 DIAGNOSIS — F33.0 MAJOR DEPRESSIVE DISORDER, RECURRENT, MILD (HCC): ICD-10-CM

## 2024-02-29 PROCEDURE — 11730 AVULSION NAIL PLATE SIMPLE 1: CPT | Performed by: FAMILY MEDICINE

## 2024-02-29 PROCEDURE — G8484 FLU IMMUNIZE NO ADMIN: HCPCS | Performed by: FAMILY MEDICINE

## 2024-02-29 PROCEDURE — 1090F PRES/ABSN URINE INCON ASSESS: CPT | Performed by: FAMILY MEDICINE

## 2024-02-29 PROCEDURE — 99213 OFFICE O/P EST LOW 20 MIN: CPT | Performed by: FAMILY MEDICINE

## 2024-02-29 PROCEDURE — 1036F TOBACCO NON-USER: CPT | Performed by: FAMILY MEDICINE

## 2024-02-29 PROCEDURE — G8427 DOCREV CUR MEDS BY ELIG CLIN: HCPCS | Performed by: FAMILY MEDICINE

## 2024-02-29 PROCEDURE — 1123F ACP DISCUSS/DSCN MKR DOCD: CPT | Performed by: FAMILY MEDICINE

## 2024-02-29 PROCEDURE — G8420 CALC BMI NORM PARAMETERS: HCPCS | Performed by: FAMILY MEDICINE

## 2024-02-29 ASSESSMENT — PATIENT HEALTH QUESTIONNAIRE - PHQ9
SUM OF ALL RESPONSES TO PHQ QUESTIONS 1-9: 0
6. FEELING BAD ABOUT YOURSELF - OR THAT YOU ARE A FAILURE OR HAVE LET YOURSELF OR YOUR FAMILY DOWN: 0
7. TROUBLE CONCENTRATING ON THINGS, SUCH AS READING THE NEWSPAPER OR WATCHING TELEVISION: 0
9. THOUGHTS THAT YOU WOULD BE BETTER OFF DEAD, OR OF HURTING YOURSELF: 0
5. POOR APPETITE OR OVEREATING: 0
SUM OF ALL RESPONSES TO PHQ QUESTIONS 1-9: 0
10. IF YOU CHECKED OFF ANY PROBLEMS, HOW DIFFICULT HAVE THESE PROBLEMS MADE IT FOR YOU TO DO YOUR WORK, TAKE CARE OF THINGS AT HOME, OR GET ALONG WITH OTHER PEOPLE: 0
SUM OF ALL RESPONSES TO PHQ QUESTIONS 1-9: 0
SUM OF ALL RESPONSES TO PHQ9 QUESTIONS 1 & 2: 0
3. TROUBLE FALLING OR STAYING ASLEEP: 0
SUM OF ALL RESPONSES TO PHQ QUESTIONS 1-9: 0
8. MOVING OR SPEAKING SO SLOWLY THAT OTHER PEOPLE COULD HAVE NOTICED. OR THE OPPOSITE, BEING SO FIGETY OR RESTLESS THAT YOU HAVE BEEN MOVING AROUND A LOT MORE THAN USUAL: 0
1. LITTLE INTEREST OR PLEASURE IN DOING THINGS: 0
4. FEELING TIRED OR HAVING LITTLE ENERGY: 0
2. FEELING DOWN, DEPRESSED OR HOPELESS: 0

## 2024-02-29 ASSESSMENT — ENCOUNTER SYMPTOMS: RESPIRATORY NEGATIVE: 1

## 2024-02-29 NOTE — PROGRESS NOTES
PROGRESS NOTE    SUBJECTIVE:   Ashley Campbell is a 89 y.o. female seen for a follow up visit regarding   Chief Complaint   Patient presents with    Procedure     Here to have right great toenail removed        HPI:  Here today for removal of her right great toenail.  It has become dysmorphic and painful.         Reviewed and updated this visit by provider:           Review of Systems   Respiratory: Negative.     Cardiovascular: Negative.           OBJECTIVE:  Vitals:    02/29/24 1605   BP: 132/80   Site: Left Upper Arm   Cuff Size: Small Adult   Pulse: 72   SpO2: 98%   Weight: 48.8 kg (107 lb 9.6 oz)   Height: 1.575 m (5' 2.01\")        Physical Exam   General: Well-appearing, no distress, bright affect, normal mentation  Extremities: The right great toenail is dysmorphic and hypertrophic      Medical problems and test results were reviewed with the patient today.     Recent Results (from the past 672 hour(s))   CBC with Auto Differential    Collection Time: 02/09/24 11:38 AM   Result Value Ref Range    WBC 7.1 4.3 - 11.1 K/uL    RBC 4.17 4.05 - 5.2 M/uL    Hemoglobin 13.0 11.7 - 15.4 g/dL    Hematocrit 40.3 35.8 - 46.3 %    MCV 96.6 82 - 102 FL    MCH 31.2 26.1 - 32.9 PG    MCHC 32.3 31.4 - 35.0 g/dL    RDW 14.1 11.9 - 14.6 %    Platelets 242 150 - 450 K/uL    MPV 8.8 (L) 9.4 - 12.3 FL    nRBC 0.00 0.0 - 0.2 K/uL    Differential Type AUTOMATED      Neutrophils % 69 43 - 78 %    Lymphocytes % 24 13 - 44 %    Monocytes % 7 4.0 - 12.0 %    Eosinophils % 0 (L) 0.5 - 7.8 %    Basophils % 0 0.0 - 2.0 %    Immature Granulocytes 0 0.0 - 5.0 %    Neutrophils Absolute 4.8 1.7 - 8.2 K/UL    Lymphocytes Absolute 1.7 0.5 - 4.6 K/UL    Monocytes Absolute 0.5 0.1 - 1.3 K/UL    Eosinophils Absolute 0.0 0.0 - 0.8 K/UL    Basophils Absolute 0.0 0.0 - 0.2 K/UL    Absolute Immature Granulocyte 0.0 0.0 - 0.5 K/UL   Basic Metabolic Panel    Collection Time: 02/09/24 11:38 AM   Result Value Ref Range    Sodium 138 136 - 146 mmol/L

## 2024-03-06 ENCOUNTER — OFFICE VISIT (OUTPATIENT)
Dept: FAMILY MEDICINE CLINIC | Facility: CLINIC | Age: 89
End: 2024-03-06
Payer: MEDICARE

## 2024-03-06 VITALS
HEIGHT: 62 IN | SYSTOLIC BLOOD PRESSURE: 132 MMHG | HEART RATE: 69 BPM | WEIGHT: 110.8 LBS | OXYGEN SATURATION: 97 % | DIASTOLIC BLOOD PRESSURE: 68 MMHG | BODY MASS INDEX: 20.39 KG/M2

## 2024-03-06 DIAGNOSIS — D50.0 IRON DEFICIENCY ANEMIA DUE TO CHRONIC BLOOD LOSS: ICD-10-CM

## 2024-03-06 DIAGNOSIS — R30.0 DYSURIA: Primary | ICD-10-CM

## 2024-03-06 DIAGNOSIS — I10 PRIMARY HYPERTENSION: ICD-10-CM

## 2024-03-06 LAB
BILIRUBIN, URINE, POC: NEGATIVE
GLUCOSE URINE, POC: NEGATIVE
KETONES, URINE, POC: NEGATIVE
LEUKOCYTE ESTERASE, URINE, POC: NEGATIVE
NITRITE, URINE, POC: NEGATIVE
PH, URINE, POC: 6 (ref 4.6–8)
PROTEIN,URINE, POC: NEGATIVE
SPECIFIC GRAVITY, URINE, POC: 1.01 (ref 1–1.03)
URINALYSIS CLARITY, POC: CLEAR
URINALYSIS COLOR, POC: YELLOW
UROBILINOGEN, POC: NORMAL

## 2024-03-06 PROCEDURE — 1123F ACP DISCUSS/DSCN MKR DOCD: CPT | Performed by: FAMILY MEDICINE

## 2024-03-06 PROCEDURE — 99213 OFFICE O/P EST LOW 20 MIN: CPT | Performed by: FAMILY MEDICINE

## 2024-03-06 PROCEDURE — G8420 CALC BMI NORM PARAMETERS: HCPCS | Performed by: FAMILY MEDICINE

## 2024-03-06 PROCEDURE — 1036F TOBACCO NON-USER: CPT | Performed by: FAMILY MEDICINE

## 2024-03-06 PROCEDURE — G8427 DOCREV CUR MEDS BY ELIG CLIN: HCPCS | Performed by: FAMILY MEDICINE

## 2024-03-06 PROCEDURE — 81003 URINALYSIS AUTO W/O SCOPE: CPT | Performed by: FAMILY MEDICINE

## 2024-03-06 PROCEDURE — G8484 FLU IMMUNIZE NO ADMIN: HCPCS | Performed by: FAMILY MEDICINE

## 2024-03-06 PROCEDURE — 1090F PRES/ABSN URINE INCON ASSESS: CPT | Performed by: FAMILY MEDICINE

## 2024-03-06 SDOH — ECONOMIC STABILITY: INCOME INSECURITY: HOW HARD IS IT FOR YOU TO PAY FOR THE VERY BASICS LIKE FOOD, HOUSING, MEDICAL CARE, AND HEATING?: PATIENT DECLINED

## 2024-03-06 SDOH — ECONOMIC STABILITY: FOOD INSECURITY: WITHIN THE PAST 12 MONTHS, THE FOOD YOU BOUGHT JUST DIDN'T LAST AND YOU DIDN'T HAVE MONEY TO GET MORE.: PATIENT DECLINED

## 2024-03-06 SDOH — ECONOMIC STABILITY: FOOD INSECURITY: WITHIN THE PAST 12 MONTHS, YOU WORRIED THAT YOUR FOOD WOULD RUN OUT BEFORE YOU GOT MONEY TO BUY MORE.: PATIENT DECLINED

## 2024-03-06 SDOH — ECONOMIC STABILITY: TRANSPORTATION INSECURITY
IN THE PAST 12 MONTHS, HAS LACK OF TRANSPORTATION KEPT YOU FROM MEETINGS, WORK, OR FROM GETTING THINGS NEEDED FOR DAILY LIVING?: PATIENT DECLINED

## 2024-03-06 SDOH — ECONOMIC STABILITY: HOUSING INSECURITY
IN THE LAST 12 MONTHS, WAS THERE A TIME WHEN YOU DID NOT HAVE A STEADY PLACE TO SLEEP OR SLEPT IN A SHELTER (INCLUDING NOW)?: PATIENT DECLINED

## 2024-03-06 ASSESSMENT — PATIENT HEALTH QUESTIONNAIRE - PHQ9
5. POOR APPETITE OR OVEREATING: 0
3. TROUBLE FALLING OR STAYING ASLEEP: 0
SUM OF ALL RESPONSES TO PHQ QUESTIONS 1-9: 0
9. THOUGHTS THAT YOU WOULD BE BETTER OFF DEAD, OR OF HURTING YOURSELF: 0
2. FEELING DOWN, DEPRESSED OR HOPELESS: 0
7. TROUBLE CONCENTRATING ON THINGS, SUCH AS READING THE NEWSPAPER OR WATCHING TELEVISION: 0
SUM OF ALL RESPONSES TO PHQ9 QUESTIONS 1 & 2: 0
6. FEELING BAD ABOUT YOURSELF - OR THAT YOU ARE A FAILURE OR HAVE LET YOURSELF OR YOUR FAMILY DOWN: 0
SUM OF ALL RESPONSES TO PHQ QUESTIONS 1-9: 0
10. IF YOU CHECKED OFF ANY PROBLEMS, HOW DIFFICULT HAVE THESE PROBLEMS MADE IT FOR YOU TO DO YOUR WORK, TAKE CARE OF THINGS AT HOME, OR GET ALONG WITH OTHER PEOPLE: 0
4. FEELING TIRED OR HAVING LITTLE ENERGY: 0
SUM OF ALL RESPONSES TO PHQ QUESTIONS 1-9: 0
1. LITTLE INTEREST OR PLEASURE IN DOING THINGS: 0
SUM OF ALL RESPONSES TO PHQ QUESTIONS 1-9: 0
8. MOVING OR SPEAKING SO SLOWLY THAT OTHER PEOPLE COULD HAVE NOTICED. OR THE OPPOSITE, BEING SO FIGETY OR RESTLESS THAT YOU HAVE BEEN MOVING AROUND A LOT MORE THAN USUAL: 0

## 2024-03-06 ASSESSMENT — ENCOUNTER SYMPTOMS: RESPIRATORY NEGATIVE: 1

## 2024-03-06 NOTE — PROGRESS NOTES
Urine, POC 6.0 4.6 - 8.0    Protein, Urine, POC Negative     Urobilinogen, POC 0.2 mg/dL     Nitrite, Urine, POC Negative     Leukocyte Esterase, Urine, POC Negative         ASSESSMENT and PLAN    1. Dysuria  Overview:  Last Assessment & Plan:   Formatting of this note might be different from the original.  Patient's urinalysis showed no signs of UTI.  If patient become symptomatic or fails to improve we can always consider running a urine culture.  Patient does not warrant antibiotics at this time.  Orders:  -     AMB POC URINALYSIS DIP STICK AUTO W/O MICRO  2. Iron deficiency anemia due to chronic blood loss, resolved  3. Primary hypertension, well-controlled    Patient is to continue to keep a BP log.  Patient voiced understanding and compliant with plan.     Medications reviewed today, stop iron therapy.  Patient had a couple duplicate bottles, advised to throw away the older pills, gave them to her daughter today to dispose of.    No follow-ups on file.       Kenneth Barlow MD

## 2024-03-07 ENCOUNTER — PATIENT MESSAGE (OUTPATIENT)
Dept: FAMILY MEDICINE CLINIC | Facility: CLINIC | Age: 89
End: 2024-03-07

## 2024-03-07 DIAGNOSIS — R29.6 FREQUENT FALLS: ICD-10-CM

## 2024-03-07 DIAGNOSIS — R26.81 UNSTEADY GAIT: Primary | ICD-10-CM

## 2024-03-08 NOTE — TELEPHONE ENCOUNTER
From: Ashley Campbell  To: Dr. Kenneth Barlow  Sent: 3/7/2024 12:21 PM EST  Subject: Shuffling feet    Could I get physical therapy on my legs and feet? I don't pick my legs up, I shuffle.    Kind Regards  Ashley Campbell   [phone]

## 2024-03-10 ENCOUNTER — HOME HEALTH ADMISSION (OUTPATIENT)
Dept: HOME HEALTH SERVICES | Facility: HOME HEALTH | Age: 89
End: 2024-03-10

## 2024-03-18 ENCOUNTER — OFFICE VISIT (OUTPATIENT)
Dept: FAMILY MEDICINE CLINIC | Facility: CLINIC | Age: 89
End: 2024-03-18
Payer: MEDICARE

## 2024-03-18 VITALS
HEART RATE: 74 BPM | DIASTOLIC BLOOD PRESSURE: 62 MMHG | HEIGHT: 62 IN | BODY MASS INDEX: 19.88 KG/M2 | WEIGHT: 108 LBS | SYSTOLIC BLOOD PRESSURE: 116 MMHG | OXYGEN SATURATION: 95 %

## 2024-03-18 DIAGNOSIS — H61.23 BILATERAL IMPACTED CERUMEN: Primary | ICD-10-CM

## 2024-03-18 DIAGNOSIS — R63.0 DECREASED APPETITE: ICD-10-CM

## 2024-03-18 DIAGNOSIS — S81.811A SKIN TEAR OF RIGHT LOWER LEG WITHOUT COMPLICATION, INITIAL ENCOUNTER: ICD-10-CM

## 2024-03-18 PROCEDURE — 1090F PRES/ABSN URINE INCON ASSESS: CPT | Performed by: FAMILY MEDICINE

## 2024-03-18 PROCEDURE — 69209 REMOVE IMPACTED EAR WAX UNI: CPT | Performed by: FAMILY MEDICINE

## 2024-03-18 PROCEDURE — G8420 CALC BMI NORM PARAMETERS: HCPCS | Performed by: FAMILY MEDICINE

## 2024-03-18 PROCEDURE — G8427 DOCREV CUR MEDS BY ELIG CLIN: HCPCS | Performed by: FAMILY MEDICINE

## 2024-03-18 PROCEDURE — 99213 OFFICE O/P EST LOW 20 MIN: CPT | Performed by: FAMILY MEDICINE

## 2024-03-18 PROCEDURE — G8484 FLU IMMUNIZE NO ADMIN: HCPCS | Performed by: FAMILY MEDICINE

## 2024-03-18 PROCEDURE — 97597 DBRDMT OPN WND 1ST 20 CM/<: CPT | Performed by: FAMILY MEDICINE

## 2024-03-18 PROCEDURE — 1123F ACP DISCUSS/DSCN MKR DOCD: CPT | Performed by: FAMILY MEDICINE

## 2024-03-18 PROCEDURE — 1036F TOBACCO NON-USER: CPT | Performed by: FAMILY MEDICINE

## 2024-03-18 RX ORDER — MIRTAZAPINE 7.5 MG/1
7.5 TABLET, FILM COATED ORAL NIGHTLY
Qty: 30 TABLET | Refills: 2 | Status: SHIPPED | OUTPATIENT
Start: 2024-03-18 | End: 2024-06-16

## 2024-03-18 NOTE — PROGRESS NOTES
PROGRESS NOTE    SUBJECTIVE:   Ashley Campbell is a 89 y.o. female seen for a follow up visit regarding   Chief Complaint   Patient presents with    Ear Fullness     Left ear    Leg Injury     RLE  hit rocking chair today        HPI:  Patient came in today for ear irrigation.  She reports that she has been having a sensation of stuffiness, particularly in her left ear.  On her way out of the house today she banged her right shin against a rocking chair, has a skin tear.  She also reports that she does not have much of an appetite.  She is lost 2 pounds since her last visit.         Reviewed and updated this visit by provider:           Review of Systems       OBJECTIVE:  Vitals:    03/18/24 1555   BP: 116/62   Site: Left Upper Arm   Position: Sitting   Cuff Size: Medium Adult   Pulse: 74   SpO2: 95%   Weight: 49 kg (108 lb)   Height: 1.575 m (5' 2\")        Physical Exam   General: Frail appearing, alert, oriented, no distress  ENT: Both ear canals demonstrate cerumen impaction, right worse than left  Lungs: Good airflow bilaterally without rales or rhonchi  CVS: Regular rate and rhythm presently, soft systolic murmur at the apex, normal S1, S2, no S3, S4  Extremities: The right lower leg, midportion, demonstrates an oval skin tear, approximately 4 mm x 3 mm, no active bleeding presently      Medical problems and test results were reviewed with the patient today.     Recent Results (from the past 672 hour(s))   AMB POC URINALYSIS DIP STICK AUTO W/O MICRO    Collection Time: 03/06/24  4:00 PM   Result Value Ref Range    Color, Urine, POC Yellow     Clarity, Urine, POC Clear     Glucose, Urine, POC Negative     Bilirubin, Urine, POC Negative     Ketones, Urine, POC Negative     Specific Gravity, Urine, POC 1.015 1.001 - 1.035    Blood, Urine, POC Negative     pH, Urine, POC 6.0 4.6 - 8.0    Protein, Urine, POC Negative     Urobilinogen, POC 0.2 mg/dL     Nitrite, Urine, POC Negative     Leukocyte Esterase, Urine, POC

## 2024-03-27 ENCOUNTER — OFFICE VISIT (OUTPATIENT)
Dept: FAMILY MEDICINE CLINIC | Facility: CLINIC | Age: 89
End: 2024-03-27
Payer: MEDICARE

## 2024-03-27 VITALS
HEIGHT: 62 IN | OXYGEN SATURATION: 98 % | SYSTOLIC BLOOD PRESSURE: 120 MMHG | BODY MASS INDEX: 20.32 KG/M2 | HEART RATE: 67 BPM | DIASTOLIC BLOOD PRESSURE: 68 MMHG | WEIGHT: 110.4 LBS

## 2024-03-27 DIAGNOSIS — S81.811S: Primary | ICD-10-CM

## 2024-03-27 DIAGNOSIS — H92.01 OTALGIA OF RIGHT EAR: ICD-10-CM

## 2024-03-27 DIAGNOSIS — Z86.73 HISTORY OF STROKE: ICD-10-CM

## 2024-03-27 PROBLEM — G45.9 TIA (TRANSIENT ISCHEMIC ATTACK): Status: RESOLVED | Noted: 2017-03-13 | Resolved: 2024-03-27

## 2024-03-27 PROCEDURE — G8484 FLU IMMUNIZE NO ADMIN: HCPCS | Performed by: FAMILY MEDICINE

## 2024-03-27 PROCEDURE — G8420 CALC BMI NORM PARAMETERS: HCPCS | Performed by: FAMILY MEDICINE

## 2024-03-27 PROCEDURE — G8427 DOCREV CUR MEDS BY ELIG CLIN: HCPCS | Performed by: FAMILY MEDICINE

## 2024-03-27 PROCEDURE — 99213 OFFICE O/P EST LOW 20 MIN: CPT | Performed by: FAMILY MEDICINE

## 2024-03-27 PROCEDURE — 1123F ACP DISCUSS/DSCN MKR DOCD: CPT | Performed by: FAMILY MEDICINE

## 2024-03-27 PROCEDURE — 1036F TOBACCO NON-USER: CPT | Performed by: FAMILY MEDICINE

## 2024-03-27 PROCEDURE — 1090F PRES/ABSN URINE INCON ASSESS: CPT | Performed by: FAMILY MEDICINE

## 2024-03-27 ASSESSMENT — ENCOUNTER SYMPTOMS: RESPIRATORY NEGATIVE: 1

## 2024-03-27 NOTE — PROGRESS NOTES
PROGRESS NOTE    SUBJECTIVE:   Ashley Campbell is a 89 y.o. female seen for a follow up visit regarding   Chief Complaint   Patient presents with    Leg Pain     Complains of pain in lower right leg; still has wound. Not healing    Otalgia     Complains of right ear pain since having         HPI:  Here for follow-up, she reports she still having some discomfort in her right ear after an attempted irrigation last week.  Still hears well.  She also wanted to follow-up on her right lower leg skin tear.  She does not feel like it is healing quickly enough.         Reviewed and updated this visit by provider:           Review of Systems   Respiratory: Negative.     Cardiovascular: Negative.           OBJECTIVE:  Vitals:    03/27/24 1127   BP: 120/68   Site: Left Upper Arm   Cuff Size: Medium Adult   Pulse: 67   SpO2: 98%   Weight: 50.1 kg (110 lb 6.4 oz)   Height: 1.575 m (5' 2.01\")        Physical Exam   General: Alert and oriented x3, well-appearing  ENT: Both ear canals demonstrate some nonocclusive cerumen, TMs are normal-appearing   Neck: No adenopathy, thyromegaly or thyroid nodules  Pulmonary: Normal effort, good airflow, no rales or rhonchi  CVS: Regular rate and rhythm, normal S1, S2, no S3 or S4, no murmurs; no carotid bruits, 2+ pedal pulses  Extremities: The right lower leg demonstrates a well-healing skin tear, oval-shaped, without drainage or advancing erythema.      Medical problems and test results were reviewed with the patient today.     Recent Results (from the past 672 hour(s))   AMB POC URINALYSIS DIP STICK AUTO W/O MICRO    Collection Time: 03/06/24  4:00 PM   Result Value Ref Range    Color, Urine, POC Yellow     Clarity, Urine, POC Clear     Glucose, Urine, POC Negative     Bilirubin, Urine, POC Negative     Ketones, Urine, POC Negative     Specific Gravity, Urine, POC 1.015 1.001 - 1.035    Blood, Urine, POC Negative     pH, Urine, POC 6.0 4.6 - 8.0    Protein, Urine, POC Negative

## 2024-03-29 ENCOUNTER — PATIENT MESSAGE (OUTPATIENT)
Dept: FAMILY MEDICINE CLINIC | Facility: CLINIC | Age: 89
End: 2024-03-29

## 2024-04-02 NOTE — TELEPHONE ENCOUNTER
From: Ashley Campbell  To: Dr. Kenneth Barlow  Sent: 3/29/2024 10:03 PM EDT  Subject: Ashley Campbell     Good Evening Dr Barlow,    I fell this morning going down my steps to get into the car. I am very sore and very bruised.    What do u advise? I do have dizzy spells.    Kind Regards  Ashley Campbell   [phone]

## 2024-04-15 ENCOUNTER — PATIENT MESSAGE (OUTPATIENT)
Dept: FAMILY MEDICINE CLINIC | Facility: CLINIC | Age: 89
End: 2024-04-15

## 2024-04-16 NOTE — TELEPHONE ENCOUNTER
From: Ashley Campbell  To: Dr. Kenneth Barlow  Sent: 4/15/2024 11:48 AM EDT  Subject: Ashley Campbell    Good morning     I am having trouble holding and food down.    I even regurgitate Ensure and Pedialyte.    I am so very nauseous. I can't get the nausea to stop.    Pls call me at      Kind Regards   Ashley Campbell

## 2024-04-16 NOTE — TELEPHONE ENCOUNTER
Patient contacted and scheduled appointment for this Thursday at 11:20 AM. Patient voiced thanks.

## 2024-04-17 ENCOUNTER — PATIENT MESSAGE (OUTPATIENT)
Dept: FAMILY MEDICINE CLINIC | Facility: CLINIC | Age: 89
End: 2024-04-17

## 2024-04-17 NOTE — TELEPHONE ENCOUNTER
Patient contacted due to MyChart message received. Patient was advised to go to ER since she felt as though she was dying and no available appointments with provider today. Patient stated she did not feel as though she was dying and could wait until her appointment with PCP tomorrow at 11:20. Advised UC or ER if symptoms get worse. Patient voiced understanding and thanks.

## 2024-04-17 NOTE — TELEPHONE ENCOUNTER
From: Ashley Campbell  To: Dr. Kenneth Barlow  Sent: 4/17/2024 9:28 AM EDT  Subject: Ashley Campbell Dying     Dr Barlow,    I think I am dying.    My body doesn't want any food and my kidneys are not working.    Can u please see me today?    Pls call me      Kind Regards   Ashley Campbell

## 2024-04-18 ENCOUNTER — OFFICE VISIT (OUTPATIENT)
Dept: FAMILY MEDICINE CLINIC | Facility: CLINIC | Age: 89
End: 2024-04-18

## 2024-04-18 VITALS
OXYGEN SATURATION: 98 % | DIASTOLIC BLOOD PRESSURE: 84 MMHG | SYSTOLIC BLOOD PRESSURE: 126 MMHG | HEIGHT: 62 IN | BODY MASS INDEX: 19.84 KG/M2 | WEIGHT: 107.8 LBS | HEART RATE: 70 BPM

## 2024-04-18 DIAGNOSIS — K52.9 ACUTE GASTROENTERITIS: Primary | ICD-10-CM

## 2024-04-18 ASSESSMENT — ENCOUNTER SYMPTOMS
RESPIRATORY NEGATIVE: 1
BLOOD IN STOOL: 0
ABDOMINAL DISTENTION: 0
ABDOMINAL PAIN: 0

## 2024-04-18 NOTE — PROGRESS NOTES
PROGRESS NOTE    SUBJECTIVE:   Ashley Campbell is a 89 y.o. female seen for a follow up visit regarding   Chief Complaint   Patient presents with    Nausea     Complains of nausea and vomiting since Saturday; reports unable to keep liquid and food down except crackers and ginger ale        HPI:  Patient reports that she was at a [employer] social this past weekend, had a nail there that was catered, she thinks she required food poisoning that she became sick the next day.  She had some vomiting, and some loose stools but no significant diarrhea.  She had some Zofran at home that she used which she reports helped quite a bit.  Presently she feels she is doing better, her appetite is coming back.         Reviewed and updated this visit by provider:           Review of Systems   Respiratory: Negative.     Cardiovascular: Negative.    Gastrointestinal:  Negative for abdominal distention, abdominal pain and blood in stool.          OBJECTIVE:  Vitals:    04/18/24 1134   BP: 126/84   Site: Left Upper Arm   Cuff Size: Small Adult   Pulse: 70   SpO2: 98%   Weight: 48.9 kg (107 lb 12.8 oz)   Height: 1.575 m (5' 2.01\")        Physical Exam   General: Alert and oriented x3, frail appearing, does not appear ill  Neck: No adenopathy, thyromegaly or thyroid nodules  Pulmonary: Normal effort, good airflow, no rales or rhonchi  CVS: Regular rate and rhythm, normal S1, S2, no S3 or S4, no murmurs; no carotid bruits, 2+ pedal pulses  Abdomen: Nondistended with normal bowel sounds, no mass or tenderness    Medical problems and test results were reviewed with the patient today.     No results found for this or any previous visit (from the past 672 hour(s)).    No results found for any visits on 04/18/24.     ASSESSMENT and PLAN    1. Acute gastroenteritis, possible foodborne illness.  Today she is much improved, appetite returning    No follow-ups on file.       Kenneth Barlow MD

## 2024-04-21 ENCOUNTER — PATIENT MESSAGE (OUTPATIENT)
Dept: FAMILY MEDICINE CLINIC | Facility: CLINIC | Age: 89
End: 2024-04-21

## 2024-04-23 NOTE — TELEPHONE ENCOUNTER
Patient contacted. Reports feeling better and has had bowel movement. Does not feel as though she needs to go to ER at this time. Advised if symptoms persistent or return to go to ER to be evaluated. Voiced understanding.

## 2024-04-23 NOTE — TELEPHONE ENCOUNTER
From: Ashley Campbell  To: Dr. Kenneth Barlow  Sent: 4/21/2024 5:33 PM EDT  Subject: Help needed    Dear Dr Barlow    I cannot hold any food down. My kidneys and my bowels are not moving at all.    I hurt so much in my colon and stomach.    Pls call me at      Kind Regards   Ashley Campbell

## 2024-05-06 ENCOUNTER — PATIENT MESSAGE (OUTPATIENT)
Dept: FAMILY MEDICINE CLINIC | Facility: CLINIC | Age: 89
End: 2024-05-06

## 2024-05-07 ENCOUNTER — TELEPHONE (OUTPATIENT)
Dept: FAMILY MEDICINE CLINIC | Facility: CLINIC | Age: 89
End: 2024-05-07

## 2024-05-07 NOTE — TELEPHONE ENCOUNTER
Patient contacted. Advised no available appointments today. Scheduled patient appointment for tomorrow at 10 AM. Patient voiced understanding and thanks.

## 2024-05-07 NOTE — TELEPHONE ENCOUNTER
From: Ashley Chen Campbell  To: Dr. Kenneth Barlow  Sent: 5/6/2024 10:03 PM EDT  Subject: Ashley Campbell- fell at [facility] 05/06/2024    Good evening,    I, Ashley Campbell, had a Terrible fall today at [facility] accompanying my Niece to have her Bloodwork today, 05/06/2024. I thought there was a chair behind me, but there wasn't. I hit my head on the wooden chair as I fell down. I have a hole in the back of my head that has 10 staples in it.    My head is in horrible pain. I lost a great deal of blood. The ER staff completed several tests on me.    Is there any way I could come into your office to see you tomorrow, Tuesday, May 7, 2024 around 3 pm? I would certainly appreciate it.    Kind Regards,  Ashley Campbell   [phone]       Pls pray. Candie Owen

## 2024-05-07 NOTE — TELEPHONE ENCOUNTER
Per Dr. Barlow; no available appointments to see patient sooner. Advised patient to go to ER due to horrible head pain from fall.      Patient contacted and advised ER per providers recommendation. Report benny wrote MyChart message and she is in fact not experiencing horrible pain. Was advised to follow up with PCP within 5-7 days for recheck on wound and abnormal lab results. States she was told by ER staff that she did not need to return there for this problem that she needed to follow up with her PCP. Explained to patient that there are no available appointments until June. Patient reports not being able to wait this long especially since she is going to need to have these staples removed. Advised to return to ER or UC to have staples removed when time to do so. Patient again explained that she was told by ER staff to not return to the hospital for this problem that she needed to see her PCP for this. Voiced sorry to patient that no appointments are available to see her sooner due to provider going to be out of the office for about 2 weeks starting next week.

## 2024-05-07 NOTE — TELEPHONE ENCOUNTER
Rescheduled pt appt for tomorrow due to Provider being out of office. She does not want to wait til June. Requesting to be seen later this week if possible.

## 2024-05-07 NOTE — TELEPHONE ENCOUNTER
Patient contacted due to having cancellation for tomorrow at 1:20. Patient would like to schedule that appointment. Scheduled.

## 2024-05-08 ENCOUNTER — OFFICE VISIT (OUTPATIENT)
Dept: FAMILY MEDICINE CLINIC | Facility: CLINIC | Age: 89
End: 2024-05-08
Payer: MEDICARE

## 2024-05-08 VITALS
OXYGEN SATURATION: 97 % | SYSTOLIC BLOOD PRESSURE: 120 MMHG | HEIGHT: 62 IN | WEIGHT: 105.2 LBS | HEART RATE: 71 BPM | BODY MASS INDEX: 19.36 KG/M2 | DIASTOLIC BLOOD PRESSURE: 68 MMHG

## 2024-05-08 DIAGNOSIS — S01.01XD LACERATION OF SCALP, SUBSEQUENT ENCOUNTER: Primary | ICD-10-CM

## 2024-05-08 PROCEDURE — G8420 CALC BMI NORM PARAMETERS: HCPCS | Performed by: FAMILY MEDICINE

## 2024-05-08 PROCEDURE — 1090F PRES/ABSN URINE INCON ASSESS: CPT | Performed by: FAMILY MEDICINE

## 2024-05-08 PROCEDURE — G8427 DOCREV CUR MEDS BY ELIG CLIN: HCPCS | Performed by: FAMILY MEDICINE

## 2024-05-08 PROCEDURE — 99213 OFFICE O/P EST LOW 20 MIN: CPT | Performed by: FAMILY MEDICINE

## 2024-05-08 PROCEDURE — 1036F TOBACCO NON-USER: CPT | Performed by: FAMILY MEDICINE

## 2024-05-08 PROCEDURE — 1123F ACP DISCUSS/DSCN MKR DOCD: CPT | Performed by: FAMILY MEDICINE

## 2024-05-08 ASSESSMENT — ENCOUNTER SYMPTOMS: RESPIRATORY NEGATIVE: 1

## 2024-05-08 NOTE — PROGRESS NOTES
PROGRESS NOTE    SUBJECTIVE:   Ashley Campbell is a 89 y.o. female seen for a follow up visit regarding   Chief Complaint   Patient presents with    Follow-up     Follow up from ER visit on 5/6/24 for fall        HPI:  Here for follow-up after having a fall and suffering a laceration to the scalp.  This actually happened in the hospital setting where she was attending with her daughter, her daughter had gotten up and some items were following from her purse and the patient lunged from her seat to try and catch items, lost her balance and hit her head on the arm of a chair.  She was taken care of to there at the hospital, had CNS imaging which showed no skull fracture or intracranial injury.  Since that time she has been doing well no complaints, no issues with her scalp laceration.         Reviewed and updated this visit by provider:           Review of Systems   Respiratory: Negative.     Cardiovascular: Negative.    Neurological:  Negative for dizziness, light-headedness and headaches.          OBJECTIVE:  Vitals:    05/08/24 1326   BP: 120/68   Site: Left Upper Arm   Cuff Size: Small Adult   Pulse: 71   SpO2: 97%   Weight: 47.7 kg (105 lb 3.2 oz)   Height: 1.575 m (5' 2.01\")        Physical Exam   General: Alert and oriented x3, well-appearing  Neck: No adenopathy, thyromegaly or thyroid nodules  Pulmonary: Normal effort, good airflow, no rales or rhonchi  CVS: Regular rate and rhythm, normal S1, S2, no S3 or S4, no murmurs; no carotid bruits, 2+ pedal pulses  Neuro: Alert and oriented, level affect, appropriate mentation, cranial nerves III through XII grossly intact, no focal motor or cerebellar deficits.  Skin: Laceration on the right parietal/occipital scalp, staples in place, no drainage, no advancing erythema.    Medical problems and test results were reviewed with the patient today.     No results found for this or any previous visit (from the past 672 hour(s)).    No results found for any visits on

## 2024-05-14 ENCOUNTER — OFFICE VISIT (OUTPATIENT)
Dept: FAMILY MEDICINE CLINIC | Facility: CLINIC | Age: 89
End: 2024-05-14
Payer: MEDICARE

## 2024-05-14 VITALS
HEIGHT: 62 IN | BODY MASS INDEX: 19.51 KG/M2 | SYSTOLIC BLOOD PRESSURE: 112 MMHG | DIASTOLIC BLOOD PRESSURE: 72 MMHG | WEIGHT: 106 LBS | OXYGEN SATURATION: 98 %

## 2024-05-14 DIAGNOSIS — S01.01XD LACERATION OF SCALP, SUBSEQUENT ENCOUNTER: Primary | ICD-10-CM

## 2024-05-14 PROCEDURE — 1036F TOBACCO NON-USER: CPT | Performed by: FAMILY MEDICINE

## 2024-05-14 PROCEDURE — 99213 OFFICE O/P EST LOW 20 MIN: CPT | Performed by: FAMILY MEDICINE

## 2024-05-14 PROCEDURE — 1090F PRES/ABSN URINE INCON ASSESS: CPT | Performed by: FAMILY MEDICINE

## 2024-05-14 PROCEDURE — 1123F ACP DISCUSS/DSCN MKR DOCD: CPT | Performed by: FAMILY MEDICINE

## 2024-05-14 PROCEDURE — G8427 DOCREV CUR MEDS BY ELIG CLIN: HCPCS | Performed by: FAMILY MEDICINE

## 2024-05-14 PROCEDURE — G8420 CALC BMI NORM PARAMETERS: HCPCS | Performed by: FAMILY MEDICINE

## 2024-05-14 NOTE — PROGRESS NOTES
PROGRESS NOTE    SUBJECTIVE:   Ashley Campbell is a 89 y.o. female seen for a follow up visit regarding   Chief Complaint   Patient presents with    Staple Removal     Here for staple removal        HPI:  Here today for staple removal.         Reviewed and updated this visit by provider:           Review of Systems       OBJECTIVE:  Vitals:    05/14/24 1144   BP: 112/72   Site: Left Upper Arm   Cuff Size: Small Adult   SpO2: 98%   Weight: 48.1 kg (106 lb)   Height: 1.575 m (5' 2.01\")        Physical Exam   General: Alert, oriented, level affect, appropriate mentation  Skin: Well-healed parietal occipital laceration, no drainage, no erythema      Medical problems and test results were reviewed with the patient today.     No results found for this or any previous visit (from the past 672 hour(s)).    No results found for any visits on 05/14/24.     ASSESSMENT and PLAN    1. Laceration of scalp, subsequent encounter    -     Staples removed without complication      No follow-ups on file.       Kenneth Barlow MD

## 2024-06-19 ENCOUNTER — OFFICE VISIT (OUTPATIENT)
Dept: FAMILY MEDICINE CLINIC | Facility: CLINIC | Age: 89
End: 2024-06-19
Payer: MEDICARE

## 2024-06-19 VITALS
DIASTOLIC BLOOD PRESSURE: 74 MMHG | BODY MASS INDEX: 19.58 KG/M2 | OXYGEN SATURATION: 96 % | SYSTOLIC BLOOD PRESSURE: 110 MMHG | WEIGHT: 106.4 LBS | HEIGHT: 62 IN | HEART RATE: 67 BPM

## 2024-06-19 DIAGNOSIS — R53.82 CHRONIC FATIGUE: Primary | ICD-10-CM

## 2024-06-19 DIAGNOSIS — R34 DECREASED URINE OUTPUT: ICD-10-CM

## 2024-06-19 DIAGNOSIS — D50.9 IRON DEFICIENCY ANEMIA, UNSPECIFIED IRON DEFICIENCY ANEMIA TYPE: ICD-10-CM

## 2024-06-19 LAB
ALBUMIN SERPL-MCNC: 3.8 G/DL (ref 3.2–4.6)
ALBUMIN/GLOB SERPL: 1.2 (ref 1–1.9)
ALP SERPL-CCNC: 77 U/L (ref 35–104)
ALT SERPL-CCNC: 13 U/L (ref 12–65)
ANION GAP SERPL CALC-SCNC: 10 MMOL/L (ref 9–18)
AST SERPL-CCNC: 23 U/L (ref 15–37)
BASOPHILS # BLD: 0 K/UL (ref 0–0.2)
BASOPHILS NFR BLD: 0 % (ref 0–2)
BILIRUB SERPL-MCNC: 0.5 MG/DL (ref 0–1.2)
BUN SERPL-MCNC: 9 MG/DL (ref 8–23)
CALCIUM SERPL-MCNC: 9.2 MG/DL (ref 8.8–10.2)
CHLORIDE SERPL-SCNC: 101 MMOL/L (ref 98–107)
CO2 SERPL-SCNC: 25 MMOL/L (ref 20–28)
CREAT SERPL-MCNC: 0.68 MG/DL (ref 0.6–1.1)
DIFFERENTIAL METHOD BLD: ABNORMAL
EOSINOPHIL # BLD: 0.1 K/UL (ref 0–0.8)
EOSINOPHIL NFR BLD: 1 % (ref 0.5–7.8)
ERYTHROCYTE [DISTWIDTH] IN BLOOD BY AUTOMATED COUNT: 13.6 % (ref 11.9–14.6)
GLOBULIN SER CALC-MCNC: 3.1 G/DL (ref 2.3–3.5)
GLUCOSE SERPL-MCNC: 92 MG/DL (ref 70–99)
HCT VFR BLD AUTO: 39.2 % (ref 35.8–46.3)
HGB BLD-MCNC: 12.7 G/DL (ref 11.7–15.4)
IMM GRANULOCYTES # BLD AUTO: 0 K/UL (ref 0–0.5)
IMM GRANULOCYTES NFR BLD AUTO: 0 % (ref 0–5)
LYMPHOCYTES # BLD: 1.1 K/UL (ref 0.5–4.6)
LYMPHOCYTES NFR BLD: 27 % (ref 13–44)
MCH RBC QN AUTO: 31.1 PG (ref 26.1–32.9)
MCHC RBC AUTO-ENTMCNC: 32.4 G/DL (ref 31.4–35)
MCV RBC AUTO: 96.1 FL (ref 82–102)
MONOCYTES # BLD: 0.3 K/UL (ref 0.1–1.3)
MONOCYTES NFR BLD: 8 % (ref 4–12)
NEUTS SEG # BLD: 2.6 K/UL (ref 1.7–8.2)
NEUTS SEG NFR BLD: 64 % (ref 43–78)
NRBC # BLD: 0 K/UL (ref 0–0.2)
PLATELET # BLD AUTO: 220 K/UL (ref 150–450)
PMV BLD AUTO: 8.9 FL (ref 9.4–12.3)
POTASSIUM SERPL-SCNC: 4.3 MMOL/L (ref 3.5–5.1)
PROT SERPL-MCNC: 6.9 G/DL (ref 6.3–8.2)
RBC # BLD AUTO: 4.08 M/UL (ref 4.05–5.2)
SODIUM SERPL-SCNC: 137 MMOL/L (ref 136–145)
WBC # BLD AUTO: 4.1 K/UL (ref 4.3–11.1)

## 2024-06-19 PROCEDURE — G8420 CALC BMI NORM PARAMETERS: HCPCS | Performed by: FAMILY MEDICINE

## 2024-06-19 PROCEDURE — 99213 OFFICE O/P EST LOW 20 MIN: CPT | Performed by: FAMILY MEDICINE

## 2024-06-19 PROCEDURE — 1090F PRES/ABSN URINE INCON ASSESS: CPT | Performed by: FAMILY MEDICINE

## 2024-06-19 PROCEDURE — G8427 DOCREV CUR MEDS BY ELIG CLIN: HCPCS | Performed by: FAMILY MEDICINE

## 2024-06-19 PROCEDURE — 1036F TOBACCO NON-USER: CPT | Performed by: FAMILY MEDICINE

## 2024-06-19 PROCEDURE — G0439 PPPS, SUBSEQ VISIT: HCPCS | Performed by: FAMILY MEDICINE

## 2024-06-19 PROCEDURE — 1123F ACP DISCUSS/DSCN MKR DOCD: CPT | Performed by: FAMILY MEDICINE

## 2024-06-19 RX ORDER — VITAMIN E (DL,TOCOPHERYL ACET) 180 MG
CAPSULE ORAL
COMMUNITY

## 2024-06-19 ASSESSMENT — PATIENT HEALTH QUESTIONNAIRE - PHQ9
SUM OF ALL RESPONSES TO PHQ QUESTIONS 1-9: 2
7. TROUBLE CONCENTRATING ON THINGS, SUCH AS READING THE NEWSPAPER OR WATCHING TELEVISION: NOT AT ALL
6. FEELING BAD ABOUT YOURSELF - OR THAT YOU ARE A FAILURE OR HAVE LET YOURSELF OR YOUR FAMILY DOWN: NOT AT ALL
8. MOVING OR SPEAKING SO SLOWLY THAT OTHER PEOPLE COULD HAVE NOTICED. OR THE OPPOSITE, BEING SO FIGETY OR RESTLESS THAT YOU HAVE BEEN MOVING AROUND A LOT MORE THAN USUAL: NOT AT ALL
3. TROUBLE FALLING OR STAYING ASLEEP: NOT AT ALL
10. IF YOU CHECKED OFF ANY PROBLEMS, HOW DIFFICULT HAVE THESE PROBLEMS MADE IT FOR YOU TO DO YOUR WORK, TAKE CARE OF THINGS AT HOME, OR GET ALONG WITH OTHER PEOPLE: NOT DIFFICULT AT ALL
2. FEELING DOWN, DEPRESSED OR HOPELESS: MORE THAN HALF THE DAYS
9. THOUGHTS THAT YOU WOULD BE BETTER OFF DEAD, OR OF HURTING YOURSELF: NOT AT ALL
5. POOR APPETITE OR OVEREATING: NOT AT ALL
SUM OF ALL RESPONSES TO PHQ QUESTIONS 1-9: 2
4. FEELING TIRED OR HAVING LITTLE ENERGY: NOT AT ALL
1. LITTLE INTEREST OR PLEASURE IN DOING THINGS: NOT AT ALL
SUM OF ALL RESPONSES TO PHQ QUESTIONS 1-9: 2
SUM OF ALL RESPONSES TO PHQ QUESTIONS 1-9: 2
SUM OF ALL RESPONSES TO PHQ9 QUESTIONS 1 & 2: 2

## 2024-06-19 ASSESSMENT — ENCOUNTER SYMPTOMS: RESPIRATORY NEGATIVE: 1

## 2024-06-19 ASSESSMENT — LIFESTYLE VARIABLES
HOW OFTEN DO YOU HAVE A DRINK CONTAINING ALCOHOL: NEVER
HOW MANY STANDARD DRINKS CONTAINING ALCOHOL DO YOU HAVE ON A TYPICAL DAY: PATIENT DOES NOT DRINK

## 2024-06-24 ENCOUNTER — PATIENT MESSAGE (OUTPATIENT)
Dept: FAMILY MEDICINE CLINIC | Facility: CLINIC | Age: 89
End: 2024-06-24

## 2024-06-24 NOTE — TELEPHONE ENCOUNTER
From: Ashley Campbell  To: Dr. Kenneth Barlow  Sent: 6/24/2024 11:48 AM EDT  Subject: Ashley Campbell    Could you please call me at ?

## 2024-06-24 NOTE — TELEPHONE ENCOUNTER
Contacted patient. Patient is requesting lab results. Reports still feeling very weak and fatigued. Please advise.

## 2024-07-01 ENCOUNTER — PATIENT MESSAGE (OUTPATIENT)
Dept: FAMILY MEDICINE CLINIC | Facility: CLINIC | Age: 89
End: 2024-07-01

## 2024-07-01 ENCOUNTER — TELEPHONE (OUTPATIENT)
Dept: FAMILY MEDICINE CLINIC | Facility: CLINIC | Age: 89
End: 2024-07-01

## 2024-07-01 NOTE — TELEPHONE ENCOUNTER
Duplicate message. Patient sent Last Guidehart message regarding symptoms. Will document under Last Guidehart message.

## 2024-07-01 NOTE — TELEPHONE ENCOUNTER
From: Ashley Campbell  To: Dr. Kenneth Barlow  Sent: 7/1/2024 11:12 AM EDT  Subject: Ashley Campbell      Good morning     I am having a terrible time. I am weak, dizzy, and I have almost fell several times.    Please call me so I can discuss further.    [phone]     Kind Regards  Ashley Campbell

## 2024-07-03 ENCOUNTER — OFFICE VISIT (OUTPATIENT)
Dept: FAMILY MEDICINE CLINIC | Facility: CLINIC | Age: 89
End: 2024-07-03
Payer: MEDICARE

## 2024-07-03 VITALS
DIASTOLIC BLOOD PRESSURE: 86 MMHG | BODY MASS INDEX: 19.65 KG/M2 | SYSTOLIC BLOOD PRESSURE: 154 MMHG | OXYGEN SATURATION: 97 % | HEIGHT: 62 IN | HEART RATE: 73 BPM | WEIGHT: 106.8 LBS
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
## 2024-07-03 LAB
ALBUMIN SERPL-MCNC: 3.8 G/DL (ref 3.2–4.6)
ALBUMIN/GLOB SERPL: 1.2 (ref 1–1.9)
ALP SERPL-CCNC: 75 U/L (ref 35–104)
ALT SERPL-CCNC: 11 U/L (ref 12–65)
ANION GAP SERPL CALC-SCNC: 10 MMOL/L (ref 9–18)
AST SERPL-CCNC: 22 U/L (ref 15–37)
BASOPHILS # BLD: 0 K/UL (ref 0–0.2)
BASOPHILS NFR BLD: 0 % (ref 0–2)
BILIRUB SERPL-MCNC: 0.3 MG/DL (ref 0–1.2)
BUN SERPL-MCNC: 9 MG/DL (ref 8–23)
CALCIUM SERPL-MCNC: 8.9 MG/DL (ref 8.8–10.2)
CHLORIDE SERPL-SCNC: 103 MMOL/L (ref 98–107)
CO2 SERPL-SCNC: 24 MMOL/L (ref 20–28)
CREAT SERPL-MCNC: 0.6 MG/DL (ref 0.6–1.1)
DIFFERENTIAL METHOD BLD: ABNORMAL
EOSINOPHIL # BLD: 0.1 K/UL (ref 0–0.8)
EOSINOPHIL NFR BLD: 2 % (ref 0.5–7.8)
ERYTHROCYTE [DISTWIDTH] IN BLOOD BY AUTOMATED COUNT: 13.7 % (ref 11.9–14.6)
GLOBULIN SER CALC-MCNC: 3.2 G/DL (ref 2.3–3.5)
GLUCOSE SERPL-MCNC: 95 MG/DL (ref 70–99)
HCT VFR BLD AUTO: 39.9 % (ref 35.8–46.3)
HGB BLD-MCNC: 12.8 G/DL (ref 11.7–15.4)
IMM GRANULOCYTES # BLD AUTO: 0 K/UL (ref 0–0.5)
IMM GRANULOCYTES NFR BLD AUTO: 0 % (ref 0–5)
LYMPHOCYTES # BLD: 1.3 K/UL (ref 0.5–4.6)
LYMPHOCYTES NFR BLD: 32 % (ref 13–44)
MCH RBC QN AUTO: 31.1 PG (ref 26.1–32.9)
MCHC RBC AUTO-ENTMCNC: 32.1 G/DL (ref 31.4–35)
MCV RBC AUTO: 96.8 FL (ref 82–102)
MONOCYTES # BLD: 0.5 K/UL (ref 0.1–1.3)
MONOCYTES NFR BLD: 12 % (ref 4–12)
NEUTS SEG # BLD: 2.2 K/UL (ref 1.7–8.2)
NEUTS SEG NFR BLD: 54 % (ref 43–78)
NRBC # BLD: 0 K/UL (ref 0–0.2)
PLATELET # BLD AUTO: 221 K/UL (ref 150–450)
PMV BLD AUTO: 8.7 FL (ref 9.4–12.3)
POTASSIUM SERPL-SCNC: 3.8 MMOL/L (ref 3.5–5.1)
PROT SERPL-MCNC: 7 G/DL (ref 6.3–8.2)
RBC # BLD AUTO: 4.12 M/UL (ref 4.05–5.2)
SODIUM SERPL-SCNC: 137 MMOL/L (ref 136–145)
WBC # BLD AUTO: 4.1 K/UL (ref 4.3–11.1)

## 2024-07-03 PROCEDURE — 1123F ACP DISCUSS/DSCN MKR DOCD: CPT | Performed by: FAMILY MEDICINE

## 2024-07-03 PROCEDURE — G8427 DOCREV CUR MEDS BY ELIG CLIN: HCPCS | Performed by: FAMILY MEDICINE

## 2024-07-03 PROCEDURE — G8420 CALC BMI NORM PARAMETERS: HCPCS | Performed by: FAMILY MEDICINE

## 2024-07-03 PROCEDURE — 99213 OFFICE O/P EST LOW 20 MIN: CPT | Performed by: FAMILY MEDICINE

## 2024-07-03 PROCEDURE — 1090F PRES/ABSN URINE INCON ASSESS: CPT | Performed by: FAMILY MEDICINE

## 2024-07-03 PROCEDURE — 1036F TOBACCO NON-USER: CPT | Performed by: FAMILY MEDICINE

## 2024-07-03 RX ORDER — DOXYCYCLINE HYCLATE 100 MG
100 TABLET ORAL 2 TIMES DAILY
Qty: 20 TABLET | Refills: 0 | Status: SHIPPED | OUTPATIENT
Start: 2024-07-03 | End: 2024-07-13

## 2024-07-03 RX ORDER — MIRTAZAPINE 7.5 MG/1
7.5 TABLET, FILM COATED ORAL NIGHTLY
COMMUNITY

## 2024-07-03 ASSESSMENT — ENCOUNTER SYMPTOMS: RESPIRATORY NEGATIVE: 1

## 2024-07-04 NOTE — PROGRESS NOTES
PROGRESS NOTE    SUBJECTIVE:   Ashley Campbell is a 89 y.o. female seen for a follow up visit regarding   Chief Complaint   Patient presents with    Dizziness     Still complaining of dizziness    Pruritis     Complains of itching on right breast and redness since Friday.         HPI:  Patient was asked to come in earlier this week because she was continued to complain of fatigue.  When she got here she reported that she has been having some pain and tenderness of her right breast.         Reviewed and updated this visit by provider:           Review of Systems   Respiratory: Negative.     Cardiovascular: Negative.           OBJECTIVE:  Vitals:    07/03/24 1127   BP: (!) 154/86   Site: Left Upper Arm   Cuff Size: Small Adult   Pulse: 73   SpO2: 97%   Weight: 48.4 kg (106 lb 12.8 oz)   Height: 1.575 m (5' 2.01\")        Physical Exam   General: Alert, no distress, frail appearing  Neck: No adenopathy, no JVD, no thyromegaly or nodularity  Chest: Good airflow, no rales or rhonchi  Breast: The right breast nipple and areolar region appears erythematous, there is a tic attached to the inferior aspect of the nipple.  This was removed with some difficulty, tic complete, identified as a [place] tick  CVS: Regular rate and rhythm presently, normal murmur appreciated    Medical problems and test results were reviewed with the patient today.     Recent Results (from the past 672 hour(s))   Comprehensive Metabolic Panel    Collection Time: 06/19/24 11:21 AM   Result Value Ref Range    Sodium 137 136 - 145 mmol/L    Potassium 4.3 3.5 - 5.1 mmol/L    Chloride 101 98 - 107 mmol/L    CO2 25 20 - 28 mmol/L    Anion Gap 10 9 - 18 mmol/L    Glucose 92 70 - 99 mg/dL    BUN 9 8 - 23 MG/DL    Creatinine 0.68 0.60 - 1.10 MG/DL    Est, Glom Filt Rate 82 >60 ml/min/1.73m2    Calcium 9.2 8.8 - 10.2 MG/DL    Total Bilirubin 0.5 0.0 - 1.2 MG/DL    ALT 13 12 - 65 U/L    AST 23 15 - 37 U/L    Alk Phosphatase 77 35 - 104 U/L    Total Protein

## 2024-07-05 LAB
COMMENT: NORMAL
COMMENT:: NORMAL
CRP SERPL-MCNC: <1 MG/L (ref 0–10)

## 2024-07-08 LAB
A PHAGOCYTOPH IGG TITR SER IF: NEGATIVE
A PHAGOCYTOPH IGM TITR SER IF: NEGATIVE
COMMENT: NORMAL
E CHAFFEENSIS IGG TITR SER IF: NEGATIVE
E CHAFFEENSIS IGM TITR SER IF: NEGATIVE

## 2024-07-15 LAB
COMMENT:: NORMAL
RICK SF IGG TITR SER IF: NORMAL {TITER}
RICK SF IGM TITR SER IF: NORMAL {TITER}

## 2024-07-17 ENCOUNTER — PATIENT MESSAGE (OUTPATIENT)
Age: 89
End: 2024-07-17

## 2024-07-17 ENCOUNTER — OFFICE VISIT (OUTPATIENT)
Age: 89
End: 2024-07-17
Payer: MEDICARE

## 2024-07-17 ENCOUNTER — TELEPHONE (OUTPATIENT)
Age: 89
End: 2024-07-17

## 2024-07-17 VITALS
HEIGHT: 62 IN | DIASTOLIC BLOOD PRESSURE: 54 MMHG | SYSTOLIC BLOOD PRESSURE: 102 MMHG | BODY MASS INDEX: 19.69 KG/M2 | HEART RATE: 76 BPM | WEIGHT: 107 LBS

## 2024-07-17 DIAGNOSIS — E78.00 PURE HYPERCHOLESTEROLEMIA: ICD-10-CM

## 2024-07-17 DIAGNOSIS — I10 ESSENTIAL HYPERTENSION, BENIGN: ICD-10-CM

## 2024-07-17 DIAGNOSIS — I10 PRIMARY HYPERTENSION: ICD-10-CM

## 2024-07-17 DIAGNOSIS — I25.10 ATHEROSCLEROSIS OF NATIVE CORONARY ARTERY OF NATIVE HEART WITHOUT ANGINA PECTORIS: Primary | ICD-10-CM

## 2024-07-17 PROCEDURE — 1090F PRES/ABSN URINE INCON ASSESS: CPT | Performed by: INTERNAL MEDICINE

## 2024-07-17 PROCEDURE — 99213 OFFICE O/P EST LOW 20 MIN: CPT | Performed by: INTERNAL MEDICINE

## 2024-07-17 PROCEDURE — 1123F ACP DISCUSS/DSCN MKR DOCD: CPT | Performed by: INTERNAL MEDICINE

## 2024-07-17 PROCEDURE — G8420 CALC BMI NORM PARAMETERS: HCPCS | Performed by: INTERNAL MEDICINE

## 2024-07-17 PROCEDURE — 1036F TOBACCO NON-USER: CPT | Performed by: INTERNAL MEDICINE

## 2024-07-17 PROCEDURE — G8428 CUR MEDS NOT DOCUMENT: HCPCS | Performed by: INTERNAL MEDICINE

## 2024-07-17 NOTE — PROGRESS NOTES
distress  HEENT:   normocephalic, atraumatic, moist mucous membranes  Neck:   No JVD or carotid bruits bilaterally  Cardiovascular:   regular rate and rhythm, no murmur/rub/gallop appreciated  Pulmonary:   clear to auscultation bilaterally, no respiratory distress  Abdomen:   soft, non-tender, non-distended  Ext:   No sig LE edema bilaterally  Skin:  warm and dry, no obvious rashes seen  Neuro:   no obvious sensory or motor deficits  Psychiatric:   normal mood and affect      Lab Results   Component Value Date/Time     07/03/2024 12:10 PM    K 3.8 07/03/2024 12:10 PM     07/03/2024 12:10 PM    CO2 24 07/03/2024 12:10 PM    BUN 9 07/03/2024 12:10 PM    CREATININE 0.60 07/03/2024 12:10 PM    GLUCOSE 95 07/03/2024 12:10 PM    CALCIUM 8.9 07/03/2024 12:10 PM        Lab Results   Component Value Date    WBC 4.1 (L) 07/03/2024    HGB 12.8 07/03/2024    HCT 39.9 07/03/2024    MCV 96.8 07/03/2024     07/03/2024       Lab Results   Component Value Date    TSH 1.520 02/27/2023       No results found for: \"LABA1C\"  No results found for: \"EAG\"    Lab Results   Component Value Date    CHOL 185 02/27/2023     Lab Results   Component Value Date    TRIG 101 02/27/2023     Lab Results   Component Value Date    HDL 73 (H) 02/27/2023     No components found for: \"LDLCHOLESTEROL\", \"LDLCALC\"  Lab Results   Component Value Date    VLDL 20.2 02/27/2023     Lab Results   Component Value Date    CHOLHDLRATIO 2.5 02/27/2023     Lab Results   Component Value Date    LDL 91.8 02/27/2023 03/16/23    TRANSTHORACIC ECHOCARDIOGRAM (TTE) COMPLETE (CONTRAST/BUBBLE/3D PRN) 03/17/2023  7:43 AM, 03/17/2023 12:00 AM (Final)    Interpretation Summary    Left Ventricle: Normal left ventricular systolic function with a visually estimated EF of 60 - 65%. Left ventricle size is normal. Mildly increased wall thickness. Normal wall motion. Abnormal diastolic function.    Left Atrium: Left atrium is moderately dilated. LA Vol Index

## 2024-07-17 NOTE — TELEPHONE ENCOUNTER
----- Message from Vivi Ojeda MA sent at 7/17/2024  8:10 AM EDT -----  Regarding: FW: Ashley Campbell  Contact: [phone]    ----- Message -----  From: Ashley Campbellan  Sent: 7/17/2024   4:09 AM EDT  To: [facility] Clinical Staff  Subject: Ashley Ham,    I have very little energy. I have no desire to eat.    Often I am weak and stumble around. I have to grab on to furniture or someone to keep from falling down.    I sometimes feel like I am dying.    I have to wear adult pads to keep from urinating on my clothes. My niece continues to try to get me to wear adult pull-ups. Would they be able to contain my urine?    I had Bloodwork the other week and Dr Barlow pulled a tick out of my right nipple. I haven't had a Mammogram in years. Dr Barlow has not yet reviewed my Bloodwork results with me. My right breast continues to itch.    I only take my blood pressure meds when I think I need them. I do not follow the directions and take them every day.    Kind Regards   Ashley Campbell

## 2024-07-18 ENCOUNTER — TELEPHONE (OUTPATIENT)
Age: 89
End: 2024-07-18

## 2024-07-18 ENCOUNTER — OFFICE VISIT (OUTPATIENT)
Dept: FAMILY MEDICINE CLINIC | Facility: CLINIC | Age: 89
End: 2024-07-18
Payer: MEDICARE

## 2024-07-18 VITALS
OXYGEN SATURATION: 95 % | SYSTOLIC BLOOD PRESSURE: 110 MMHG | HEIGHT: 62 IN | DIASTOLIC BLOOD PRESSURE: 60 MMHG | HEART RATE: 70 BPM | BODY MASS INDEX: 19.8 KG/M2 | WEIGHT: 107.6 LBS

## 2024-07-18 DIAGNOSIS — R30.0 DYSURIA: ICD-10-CM

## 2024-07-18 DIAGNOSIS — I10 PRIMARY HYPERTENSION: ICD-10-CM

## 2024-07-18 DIAGNOSIS — R30.0 DYSURIA: Primary | ICD-10-CM

## 2024-07-18 DIAGNOSIS — F33.1 MAJOR DEPRESSIVE DISORDER, RECURRENT, MODERATE (HCC): ICD-10-CM

## 2024-07-18 DIAGNOSIS — R53.83 MALAISE AND FATIGUE: ICD-10-CM

## 2024-07-18 DIAGNOSIS — R53.81 MALAISE AND FATIGUE: ICD-10-CM

## 2024-07-18 PROBLEM — F33.9 MAJOR DEPRESSIVE DISORDER, RECURRENT, UNSPECIFIED (HCC): Status: RESOLVED | Noted: 2023-09-25 | Resolved: 2024-07-18

## 2024-07-18 PROBLEM — F33.0 MAJOR DEPRESSIVE DISORDER, RECURRENT, MILD (HCC): Status: RESOLVED | Noted: 2023-09-25 | Resolved: 2024-07-18

## 2024-07-18 LAB
BACTERIA URNS QL MICRO: ABNORMAL /HPF
BILIRUBIN, URINE, POC: NEGATIVE
BLOOD URINE, POC: ABNORMAL
CASTS URNS QL MICRO: 0 /LPF
CRYSTALS URNS QL MICRO: 0 /LPF
EPI CELLS #/AREA URNS HPF: ABNORMAL /HPF
GLUCOSE URINE, POC: NEGATIVE
KETONES, URINE, POC: NEGATIVE
LEUKOCYTE ESTERASE, URINE, POC: ABNORMAL
MUCOUS THREADS URNS QL MICRO: 0 /LPF
NITRITE, URINE, POC: NEGATIVE
OTHER OBSERVATIONS: ABNORMAL
PH, URINE, POC: 6 (ref 4.6–8)
PROTEIN,URINE, POC: NEGATIVE
RBC #/AREA URNS HPF: ABNORMAL /HPF
SPECIFIC GRAVITY, URINE, POC: <=1.005 (ref 1–1.03)
URINALYSIS CLARITY, POC: CLEAR
URINALYSIS COLOR, POC: YELLOW
UROBILINOGEN, POC: ABNORMAL
WBC URNS QL MICRO: ABNORMAL /HPF

## 2024-07-18 PROCEDURE — G8420 CALC BMI NORM PARAMETERS: HCPCS | Performed by: FAMILY MEDICINE

## 2024-07-18 PROCEDURE — 1123F ACP DISCUSS/DSCN MKR DOCD: CPT | Performed by: FAMILY MEDICINE

## 2024-07-18 PROCEDURE — 1090F PRES/ABSN URINE INCON ASSESS: CPT | Performed by: FAMILY MEDICINE

## 2024-07-18 PROCEDURE — 99214 OFFICE O/P EST MOD 30 MIN: CPT | Performed by: FAMILY MEDICINE

## 2024-07-18 PROCEDURE — 81003 URINALYSIS AUTO W/O SCOPE: CPT | Performed by: FAMILY MEDICINE

## 2024-07-18 PROCEDURE — G8427 DOCREV CUR MEDS BY ELIG CLIN: HCPCS | Performed by: FAMILY MEDICINE

## 2024-07-18 PROCEDURE — 1036F TOBACCO NON-USER: CPT | Performed by: FAMILY MEDICINE

## 2024-07-18 ASSESSMENT — ENCOUNTER SYMPTOMS: RESPIRATORY NEGATIVE: 1

## 2024-07-18 NOTE — TELEPHONE ENCOUNTER
----- Message from Molina Ham DO sent at 7/17/2024  1:19 PM EDT -----  Needs referral for HH, home nurse, home PT and OT.   Thanks

## 2024-07-18 NOTE — PROGRESS NOTES
PROGRESS NOTE    SUBJECTIVE:   Ashley Campbell is a 89 y.o. female seen for a follow up visit regarding   Chief Complaint   Patient presents with    Fatigue     Labs done 7/3; would like to discuss results  Seen Cardiology yesterday; ordering PT for patient    Dysuria     Complains of pain when urinating, increased frequency, urine incontinence.         HPI:  Here for follow-up after having a cellulitis on the right breast.  She was found to have a tick attached to her nipple, she had surrounding erythema.  Tick was not engorged.  Serology for HGE, HGM, RMSF were negative.  She was treated with doxycycline, breast has completely cleared.  She feels well other than her usual chronic fatigue.  She saw cardiology yesterday, that note is reviewed today.  Today she reports having some dysuria and increased frequency.  No fever or hematuria.         Reviewed and updated this visit by provider:  Tobacco  Allergies  Meds  Problems  Med Hx  Surg Hx  Fam Hx           Review of Systems   Respiratory: Negative.     Cardiovascular: Negative.           OBJECTIVE:  Vitals:    07/18/24 1022   BP: 110/60   Site: Left Upper Arm   Cuff Size: Small Adult   Pulse: 70   SpO2: 95%   Weight: 48.8 kg (107 lb 9.6 oz)   Height: 1.575 m (5' 2.01\")        Physical Exam   General: Alert and oriented x3, well-appearing  Neck: No adenopathy, thyromegaly or thyroid nodules  Breast: The erythema and induration of the right breast has completely resolved, no residual erythema or tenderness.  Pulmonary: Normal effort, good airflow, no rales or rhonchi  CVS: Regular rate and rhythm, normal S1, S2, no S3 or S4, no murmurs; no carotid bruits, 2+ pedal pulses      Medical problems and test results were reviewed with the patient today.     Recent Results (from the past 672 hour(s))   C-Reactive Protein    Collection Time: 07/03/24 12:10 PM   Result Value Ref Range    CRP <1 0 - 10 mg/L   Comprehensive Metabolic Panel    Collection Time: 07/03/24

## 2024-07-19 RX ORDER — SULFAMETHOXAZOLE AND TRIMETHOPRIM 400; 80 MG/1; MG/1
1 TABLET ORAL 2 TIMES DAILY
Qty: 20 TABLET | Refills: 0 | Status: SHIPPED | OUTPATIENT
Start: 2024-07-19 | End: 2024-07-29

## 2024-07-20 LAB
BACTERIA SPEC CULT: ABNORMAL
SERVICE CMNT-IMP: ABNORMAL

## 2024-07-23 ENCOUNTER — TELEPHONE (OUTPATIENT)
Dept: FAMILY MEDICINE CLINIC | Facility: CLINIC | Age: 89
End: 2024-07-23

## 2024-07-23 ENCOUNTER — PATIENT MESSAGE (OUTPATIENT)
Dept: FAMILY MEDICINE CLINIC | Facility: CLINIC | Age: 89
End: 2024-07-23

## 2024-07-23 NOTE — TELEPHONE ENCOUNTER
Patient called and stated the antibiotic she is on is making her sick wanting to know if she can stop taking or be put on something different

## 2024-07-24 DIAGNOSIS — N30.00 ACUTE CYSTITIS WITHOUT HEMATURIA: Primary | ICD-10-CM

## 2024-07-24 RX ORDER — CIPROFLOXACIN 250 MG/1
250 TABLET, FILM COATED ORAL 2 TIMES DAILY
Qty: 14 TABLET | Refills: 0 | Status: SHIPPED | OUTPATIENT
Start: 2024-07-24 | End: 2024-07-31

## 2024-07-24 NOTE — TELEPHONE ENCOUNTER
Spoke with patient regarding antibiotics. Dr. Barlow has had her stop the bactrim, and is sending over Cipro today.

## 2024-07-26 ENCOUNTER — PATIENT MESSAGE (OUTPATIENT)
Dept: FAMILY MEDICINE CLINIC | Facility: CLINIC | Age: 89
End: 2024-07-26

## 2024-07-26 RX ORDER — ONDANSETRON 4 MG/1
4 TABLET, ORALLY DISINTEGRATING ORAL 3 TIMES DAILY PRN
Qty: 21 TABLET | Refills: 0 | Status: SHIPPED | OUTPATIENT
Start: 2024-07-26

## 2024-07-26 NOTE — TELEPHONE ENCOUNTER
Shalini Martinez MA 7/26/2024 9:05 AM EDT      ----- Message -----  From: Ashley Campbell  Sent: 7/26/2024 8:55 AM EDT  To: [facility] Clinical Staff  Subject: Ashley Campbell- still sick with new antibi*    Good morning     Seems new antibiotics are making me sick.    I am miserable. I can't keep any good down     Pls call me at  or  today    Kind Regards   Ashley Campbell

## 2024-07-26 NOTE — TELEPHONE ENCOUNTER
Pt notified zofran will be sent to her pharmacy for her to  today. She states she is trying to eat, but it is just making her sick.

## 2024-08-19 ENCOUNTER — OFFICE VISIT (OUTPATIENT)
Dept: FAMILY MEDICINE CLINIC | Facility: CLINIC | Age: 89
End: 2024-08-19
Payer: MEDICARE

## 2024-08-19 VITALS
BODY MASS INDEX: 19.17 KG/M2 | HEIGHT: 62 IN | SYSTOLIC BLOOD PRESSURE: 150 MMHG | DIASTOLIC BLOOD PRESSURE: 82 MMHG | OXYGEN SATURATION: 96 % | WEIGHT: 104.2 LBS | HEART RATE: 67 BPM

## 2024-08-19 DIAGNOSIS — I10 PRIMARY HYPERTENSION: ICD-10-CM

## 2024-08-19 DIAGNOSIS — R53.1 WEAKNESS: ICD-10-CM

## 2024-08-19 DIAGNOSIS — R29.6 REPEATED FALLS: ICD-10-CM
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
## 2024-08-19 DIAGNOSIS — E55.9 VITAMIN D DEFICIENCY: ICD-10-CM

## 2024-08-19 DIAGNOSIS — F33.0 MAJOR DEPRESSIVE DISORDER, RECURRENT, MILD (HCC): ICD-10-CM

## 2024-08-19 LAB
25(OH)D3 SERPL-MCNC: 31.5 NG/ML (ref 30–100)
ALBUMIN SERPL-MCNC: 3.7 G/DL (ref 3.2–4.6)
ALBUMIN/GLOB SERPL: 1.1 (ref 1–1.9)
ALP SERPL-CCNC: 78 U/L (ref 35–104)
ALT SERPL-CCNC: 16 U/L (ref 12–65)
ANION GAP SERPL CALC-SCNC: 10 MMOL/L (ref 9–18)
AST SERPL-CCNC: 23 U/L (ref 15–37)
BASOPHILS # BLD: 0 K/UL (ref 0–0.2)
BASOPHILS NFR BLD: 0 % (ref 0–2)
BILIRUB SERPL-MCNC: 0.3 MG/DL (ref 0–1.2)
BUN SERPL-MCNC: 8 MG/DL (ref 8–23)
CALCIUM SERPL-MCNC: 9.3 MG/DL (ref 8.8–10.2)
CHLORIDE SERPL-SCNC: 96 MMOL/L (ref 98–107)
CO2 SERPL-SCNC: 26 MMOL/L (ref 20–28)
CREAT SERPL-MCNC: 0.62 MG/DL (ref 0.6–1.1)
DIFFERENTIAL METHOD BLD: ABNORMAL
EOSINOPHIL # BLD: 0 K/UL (ref 0–0.8)
EOSINOPHIL NFR BLD: 1 % (ref 0.5–7.8)
ERYTHROCYTE [DISTWIDTH] IN BLOOD BY AUTOMATED COUNT: 13.5 % (ref 11.9–14.6)
GLOBULIN SER CALC-MCNC: 3.4 G/DL (ref 2.3–3.5)
GLUCOSE SERPL-MCNC: 88 MG/DL (ref 70–99)
HCT VFR BLD AUTO: 40.4 % (ref 35.8–46.3)
HGB BLD-MCNC: 12.7 G/DL (ref 11.7–15.4)
IMM GRANULOCYTES # BLD AUTO: 0 K/UL (ref 0–0.5)
IMM GRANULOCYTES NFR BLD AUTO: 0 % (ref 0–5)
LYMPHOCYTES # BLD: 1.3 K/UL (ref 0.5–4.6)
LYMPHOCYTES NFR BLD: 30 % (ref 13–44)
MCH RBC QN AUTO: 30.4 PG (ref 26.1–32.9)
MCHC RBC AUTO-ENTMCNC: 31.4 G/DL (ref 31.4–35)
MCV RBC AUTO: 96.7 FL (ref 82–102)
MONOCYTES # BLD: 0.4 K/UL (ref 0.1–1.3)
MONOCYTES NFR BLD: 10 % (ref 4–12)
NEUTS SEG # BLD: 2.6 K/UL (ref 1.7–8.2)
NEUTS SEG NFR BLD: 59 % (ref 43–78)
NRBC # BLD: 0 K/UL (ref 0–0.2)
PLATELET # BLD AUTO: 251 K/UL (ref 150–450)
PMV BLD AUTO: 9 FL (ref 9.4–12.3)
POTASSIUM SERPL-SCNC: 4.1 MMOL/L (ref 3.5–5.1)
PROT SERPL-MCNC: 7.1 G/DL (ref 6.3–8.2)
RBC # BLD AUTO: 4.18 M/UL (ref 4.05–5.2)
SODIUM SERPL-SCNC: 132 MMOL/L (ref 136–145)
TSH, 3RD GENERATION: 1.39 UIU/ML (ref 0.27–4.2)
VIT B12 SERPL-MCNC: 695 PG/ML (ref 193–986)
WBC # BLD AUTO: 4.3 K/UL (ref 4.3–11.1)

## 2024-08-19 PROCEDURE — 1123F ACP DISCUSS/DSCN MKR DOCD: CPT | Performed by: FAMILY MEDICINE

## 2024-08-19 PROCEDURE — G8420 CALC BMI NORM PARAMETERS: HCPCS | Performed by: FAMILY MEDICINE

## 2024-08-19 PROCEDURE — G8427 DOCREV CUR MEDS BY ELIG CLIN: HCPCS | Performed by: FAMILY MEDICINE

## 2024-08-19 PROCEDURE — 1090F PRES/ABSN URINE INCON ASSESS: CPT | Performed by: FAMILY MEDICINE

## 2024-08-19 PROCEDURE — 99214 OFFICE O/P EST MOD 30 MIN: CPT | Performed by: FAMILY MEDICINE

## 2024-08-19 PROCEDURE — 1036F TOBACCO NON-USER: CPT | Performed by: FAMILY MEDICINE

## 2024-08-19 RX ORDER — ESCITALOPRAM OXALATE 20 MG/1
20 TABLET ORAL DAILY
Qty: 90 TABLET | Refills: 3 | Status: SHIPPED | OUTPATIENT
Start: 2024-08-19 | End: 2025-08-19

## 2024-08-19 RX ORDER — ENALAPRIL MALEATE AND HYDROCHLOROTHIAZIDE 10; 25 MG/1; MG/1
0.5 TABLET ORAL DAILY
COMMUNITY

## 2024-08-19 RX ORDER — HYDROCHLOROTHIAZIDE 12.5 MG/1
12.5 CAPSULE, GELATIN COATED ORAL EVERY MORNING
Qty: 90 CAPSULE | Refills: 3 | Status: SHIPPED | OUTPATIENT
Start: 2024-08-19 | End: 2025-08-19

## 2024-08-19 RX ORDER — TRAMADOL HYDROCHLORIDE 50 MG/1
50 TABLET ORAL EVERY 6 HOURS PRN
COMMUNITY

## 2024-08-19 ASSESSMENT — ENCOUNTER SYMPTOMS: RESPIRATORY NEGATIVE: 1

## 2024-08-19 NOTE — PROGRESS NOTES
PROGRESS NOTE    SUBJECTIVE:   Ashley Campbell is a 89 y.o. female seen for a follow up visit regarding   Chief Complaint   Patient presents with    Fatigue     Complains of fatigue, weakness, had fall on Friday        HPI:  89-year-old female patient with chronic weakness and fatigue, reports that she had a fall last Friday, no warning symptoms of near syncope or lightheadedness, reports that she just got weak and all of a sudden fell.  She did not lose consciousness, no apparent fractures based on her ability to continue ambulation.  She walked in today without difficulty, feels well presently, has has a little stiffness in her neck.         Reviewed and updated this visit by provider:           Review of Systems   Respiratory: Negative.     Cardiovascular: Negative.           OBJECTIVE:  Vitals:    08/19/24 1128   BP: (!) 150/82   Site: Left Upper Arm   Cuff Size: Small Adult   Pulse: 67   SpO2: 96%   Weight: 47.3 kg (104 lb 3.2 oz)   Height: 1.575 m (5' 2.01\")        Physical Exam   General: Alert and oriented x3, well-appearing  Neck: No adenopathy, thyromegaly or thyroid nodules  Pulmonary: Normal effort, good airflow, no rales or rhonchi  CVS: Regular rate and rhythm, normal S1, S2, no S3 or S4, no murmurs; no carotid bruits, 2+ pedal pulses  Musculoskeletal: Good range of motion of the cervical spine without difficulty, C5-C8 motor exam shows no focal deficits although she is generally weak, not new.  Of the hips and knees without pain.  She has good range of motion    Medical problems and test results were reviewed with the patient today.     No results found for this or any previous visit (from the past 672 hour(s)).    No results found for any visits on 08/19/24.     ASSESSMENT and PLAN    1. Fall, initial encounter, no injury   -     Will do metabolic workup, CBC to rule out anemia, will check her carotids.  Normal her exam today, no murmurs, recent echo shows no AS.  2. Major depressive disorder,

## 2024-08-21 ENCOUNTER — HOSPITAL ENCOUNTER (OUTPATIENT)
Dept: ULTRASOUND IMAGING | Age: 89
Discharge: HOME | End: 2024-08-24
Attending: FAMILY MEDICINE
Payer: MEDICARE

## 2024-08-21 DIAGNOSIS — R29.6 REPEATED FALLS: ICD-10-CM

## 2024-08-21 PROCEDURE — 93880 EXTRACRANIAL BILAT STUDY: CPT | Performed by: RADIOLOGY

## 2024-08-21 PROCEDURE — 93880 EXTRACRANIAL BILAT STUDY: CPT

## 2024-09-22 ENCOUNTER — PATIENT MESSAGE (OUTPATIENT)
Dept: FAMILY MEDICINE CLINIC | Facility: CLINIC | Age: 89
End: 2024-09-22

## 2024-09-24 ENCOUNTER — OFFICE VISIT (OUTPATIENT)
Dept: FAMILY MEDICINE CLINIC | Facility: CLINIC | Age: 89
End: 2024-09-24
Payer: MEDICARE

## 2024-09-24 VITALS
BODY MASS INDEX: 18.92 KG/M2 | DIASTOLIC BLOOD PRESSURE: 60 MMHG | HEART RATE: 75 BPM | WEIGHT: 102.8 LBS | SYSTOLIC BLOOD PRESSURE: 146 MMHG | OXYGEN SATURATION: 96 % | HEIGHT: 62 IN

## 2024-09-24 DIAGNOSIS — I10 PRIMARY HYPERTENSION: ICD-10-CM

## 2024-09-24 DIAGNOSIS — R54 FRAILTY: ICD-10-CM

## 2024-09-24 DIAGNOSIS — E87.1 HYPONATREMIA: Primary | ICD-10-CM

## 2024-09-24 LAB
ANION GAP SERPL CALC-SCNC: 12 MMOL/L (ref 9–18)
BUN SERPL-MCNC: 10 MG/DL (ref 8–23)
CALCIUM SERPL-MCNC: 9.4 MG/DL (ref 8.8–10.2)
CHLORIDE SERPL-SCNC: 100 MMOL/L (ref 98–107)
CO2 SERPL-SCNC: 24 MMOL/L (ref 20–28)
CREAT SERPL-MCNC: 0.59 MG/DL (ref 0.6–1.1)
GLUCOSE SERPL-MCNC: 125 MG/DL (ref 70–99)
POTASSIUM SERPL-SCNC: 3.5 MMOL/L (ref 3.5–5.1)
SODIUM SERPL-SCNC: 135 MMOL/L (ref 136–145)

## 2024-09-24 PROCEDURE — G8420 CALC BMI NORM PARAMETERS: HCPCS | Performed by: FAMILY MEDICINE

## 2024-09-24 PROCEDURE — 1123F ACP DISCUSS/DSCN MKR DOCD: CPT | Performed by: FAMILY MEDICINE

## 2024-09-24 PROCEDURE — 1090F PRES/ABSN URINE INCON ASSESS: CPT | Performed by: FAMILY MEDICINE

## 2024-09-24 PROCEDURE — 99214 OFFICE O/P EST MOD 30 MIN: CPT | Performed by: FAMILY MEDICINE

## 2024-09-24 PROCEDURE — 1036F TOBACCO NON-USER: CPT | Performed by: FAMILY MEDICINE

## 2024-09-24 PROCEDURE — G8427 DOCREV CUR MEDS BY ELIG CLIN: HCPCS | Performed by: FAMILY MEDICINE

## 2024-09-24 RX ORDER — LISINOPRIL 10 MG/1
10 TABLET ORAL DAILY
Qty: 90 TABLET | Refills: 1 | Status: SHIPPED | OUTPATIENT
Start: 2024-09-24 | End: 2024-09-26 | Stop reason: SDUPTHER

## 2024-09-24 ASSESSMENT — ENCOUNTER SYMPTOMS: RESPIRATORY NEGATIVE: 1

## 2024-09-26 DIAGNOSIS — I10 PRIMARY HYPERTENSION: ICD-10-CM

## 2024-09-26 RX ORDER — LISINOPRIL 10 MG/1
10 TABLET ORAL DAILY
Qty: 90 TABLET | Refills: 1 | Status: SHIPPED | OUTPATIENT
Start: 2024-09-26

## 2024-10-13 ENCOUNTER — PATIENT MESSAGE (OUTPATIENT)
Dept: FAMILY MEDICINE CLINIC | Facility: CLINIC | Age: 89
End: 2024-10-13

## 2024-10-14 NOTE — TELEPHONE ENCOUNTER
Patient contacted and advised provider is out of the office all week. Advised to return to UC due to high BP readings. Reports going to go to [facility] due to not being pleased with UC in [place].

## 2024-10-17 ENCOUNTER — TELEPHONE (OUTPATIENT)
Age: 89
End: 2024-10-17

## 2024-10-17 DIAGNOSIS — I10 PRIMARY HYPERTENSION: ICD-10-CM

## 2024-10-17 RX ORDER — LISINOPRIL 20 MG/1
20 TABLET ORAL 2 TIMES DAILY
Qty: 60 TABLET | Refills: 1 | Status: SHIPPED | OUTPATIENT
Start: 2024-10-17

## 2024-10-17 NOTE — TELEPHONE ENCOUNTER
Pt.has an appt.to see  11/18 but her BP has been high.Seen in ER yesterday for /90.She is currently taking Lisinopril 10mg qday ER told her to take 2 tabs daily until she can get in touch w/.Please advise..

## 2024-10-17 NOTE — TELEPHONE ENCOUNTER
I called and informed pt.of MD response and she v/u.Appt.made for tomorrow for BP check.Med escribed as below:  Requested Prescriptions     Signed Prescriptions Disp Refills    lisinopril (PRINIVIL;ZESTRIL) 20 MG tablet 60 tablet 1     Sig: Take 1 tablet by mouth in the morning and at bedtime     Authorizing Provider: BETO WELSH     Ordering User: JULIANNE PADILLA

## 2024-10-17 NOTE — TELEPHONE ENCOUNTER
Increase Ace to 20 BID.  Come in tomorrow for nurse office BP check.   Low salt diet needed.    Thanks

## 2024-10-17 NOTE — TELEPHONE ENCOUNTER
Patient called stating she has the following issues :    PC=950/89 yesterday  ER Visit  Meds changed by PCP  BP remains high  Offered earlier appt for 10/22 and declined   Not feeling well this AM

## 2024-10-18 ENCOUNTER — NURSE ONLY (OUTPATIENT)
Age: 89
End: 2024-10-18

## 2024-10-18 VITALS — DIASTOLIC BLOOD PRESSURE: 64 MMHG | SYSTOLIC BLOOD PRESSURE: 118 MMHG

## 2024-10-18 NOTE — PROGRESS NOTES
Patients blood pressure was good today, advised to recheck BP over the weekend in case it gets too low. Patient advised on Lisinopril 20 mg 2 tabs BID

## 2024-11-05 ENCOUNTER — OFFICE VISIT (OUTPATIENT)
Age: 89
End: 2024-11-05

## 2024-11-05 VITALS
SYSTOLIC BLOOD PRESSURE: 134 MMHG | BODY MASS INDEX: 18.95 KG/M2 | HEART RATE: 74 BPM | DIASTOLIC BLOOD PRESSURE: 80 MMHG | HEIGHT: 62 IN | WEIGHT: 103 LBS

## 2024-11-05 DIAGNOSIS — E78.00 PURE HYPERCHOLESTEROLEMIA: ICD-10-CM

## 2024-11-05 DIAGNOSIS — I10 ESSENTIAL HYPERTENSION, BENIGN: Primary | ICD-10-CM

## 2024-11-05 DIAGNOSIS — I10 PRIMARY HYPERTENSION: ICD-10-CM

## 2024-11-05 DIAGNOSIS — I25.10 ATHEROSCLEROSIS OF NATIVE CORONARY ARTERY OF NATIVE HEART WITHOUT ANGINA PECTORIS: ICD-10-CM

## 2024-11-05 DIAGNOSIS — R00.2 PALPITATIONS: ICD-10-CM

## 2024-11-05 NOTE — PROGRESS NOTES
3/17/2023  7:43 AM, Signed by: Unknown Provider Result on 3/17/2023 12:00 AM        I have Independently reviewed prior care notes, any ER records available, cardiac testing, labs and results with the patient and before seeing the patient today.  Also independently reviewed outside records when available.       ASSESSMENT:    Ashley was seen today for coronary artery disease and follow-up.    Diagnoses and all orders for this visit:    Essential hypertension, benign    Atherosclerosis of native coronary artery of native heart without angina pectoris    Primary hypertension    Palpitations    Pure hypercholesterolemia          PLAN:     1. HTN: white coat HTN.  On Ace, 20 BID now.  BP better now.      Stress and anxiety mgmt is key for her.        2. CAD: EF normal.   Wants on less meds.      The patient has been instructed to call with any angina or equivalent as reviewed today. All questions were answered with the patient voicing complete understanding.          3. Fe Def anemia: better now     4. HPL:  off crestor.            Patient has been instructed and agrees to call our office with any issues or other concerns related to their cardiac condition(s) and/or complaint(s).        Return in about 6 months (around 5/5/2025).       Molina Ham, DO  11/5/2024

## 2024-11-08 ENCOUNTER — PATIENT MESSAGE (OUTPATIENT)
Dept: FAMILY MEDICINE CLINIC | Facility: CLINIC | Age: 89
End: 2024-11-08

## 2024-11-08 ENCOUNTER — HOSPITAL ENCOUNTER (EMERGENCY)
Age: 89
Discharge: HOME | End: 2024-11-08
Attending: EMERGENCY MEDICINE
Payer: MEDICARE

## 2024-11-08 ENCOUNTER — APPOINTMENT (OUTPATIENT)
Dept: CT IMAGING | Age: 89
End: 2024-11-08
Payer: MEDICARE

## 2024-11-08 ENCOUNTER — TELEPHONE (OUTPATIENT)
Dept: ORTHOPEDIC SURGERY | Age: 89
End: 2024-11-08

## 2024-11-08 ENCOUNTER — APPOINTMENT (OUTPATIENT)
Dept: GENERAL RADIOLOGY | Age: 89
End: 2024-11-08
Payer: MEDICARE

## 2024-11-08 VITALS
TEMPERATURE: 97.8 F | DIASTOLIC BLOOD PRESSURE: 64 MMHG | WEIGHT: 103 LBS | RESPIRATION RATE: 16 BRPM | BODY MASS INDEX: 18.95 KG/M2 | SYSTOLIC BLOOD PRESSURE: 154 MMHG | OXYGEN SATURATION: 97 % | HEART RATE: 67 BPM | HEIGHT: 62 IN

## 2024-11-08 DIAGNOSIS — S42.92XA CLOSED FRACTURE OF LEFT SHOULDER, INITIAL ENCOUNTER: Primary | ICD-10-CM

## 2024-11-08 PROCEDURE — 73030 X-RAY EXAM OF SHOULDER: CPT

## 2024-11-08 PROCEDURE — 99284 EMERGENCY DEPT VISIT MOD MDM: CPT

## 2024-11-08 PROCEDURE — 70450 CT HEAD/BRAIN W/O DYE: CPT

## 2024-11-08 PROCEDURE — 72125 CT NECK SPINE W/O DYE: CPT

## 2024-11-08 ASSESSMENT — PAIN - FUNCTIONAL ASSESSMENT: PAIN_FUNCTIONAL_ASSESSMENT: 0-10

## 2024-11-08 ASSESSMENT — PAIN SCALES - GENERAL: PAINLEVEL_OUTOF10: 3

## 2024-11-08 NOTE — ED PROVIDER NOTES
Emergency Department Provider Note       PCP: Kenneth Barlow MD   Age: 89 y.o.   Sex: female     DISPOSITION Decision To Discharge 11/08/2024 01:36:23 PM            ICD-10-CM    1. Closed fracture of left shoulder, initial encounter  S42.92XA [facility]          Medical Decision Making     All imaging reviewed by me.  Head CT and neck CT showed no acute process.  X-ray of the left shoulder shows a proximal humerus femoral neck fracture.  Nondisplaced.  These images were reviewed by orthopedic team on-call who stated this to be nonoperative management.  This was a relief for the patient who did not want any surgical intervention anyway.  A sling and swath will be applied and patient will be instructed to follow-up with the orthopedist in the coming days.  Patient was involved in decision making and comfortable with the plan.     1 acute illness with systemic symptoms.  Patient was discharged risks and benefits of hospitalization were considered.  Discussion with external consultants.  Shared medical decision making was utilized in creating the patients health plan today.    I independently ordered and reviewed each unique test.                     History     Patient presents to the emergency department the chief complaint of fall.  Main complaint is left shoulder pain.  States she was walking through her house yesterday when she tripped over her dog.  Denies striking her head.  There was no loss of consciousness.  After laying on the floor for a few moments she was able to stand.  She has been ambulatory today with her normal steady gait.  States a neighbor came to check on her and when she told the neighbor what happened 911 was subsequently called.  Upon arrival to the emergency department patient denies any headache.  Continued to endorse left shoulder pain.  No hip pain.  Some mild neck discomfort.  Denies any chest pain or shortness of breath.    The history is provided by the  Patient Declined (3/6/2024)    Hunger Vital Sign     Worried About Running Out of Food in the Last Year: Patient declined     Ran Out of Food in the Last Year: Patient declined   Transportation Needs: Unknown (3/6/2024)    PRAPARE - Transportation     Lack of Transportation (Non-Medical): Patient declined   Physical Activity: Inactive (6/19/2024)    Exercise Vital Sign     Days of Exercise per Week: 0 days     Minutes of Exercise per Session: 0 min   Social Connections: Feeling Somewhat Isolated (1/9/2024)    OASIS : Social Isolation     Frequency of experiencing loneliness or isolation: Sometimes   Intimate Partner Violence: Unknown (3/19/2021)    [facility]    Intimate Partner Violence     Fear of Current or Ex-Partner: Not asked     Emotionally Abused: Not asked     Physically Abused: Not asked     Sexually Abused: Not asked   Housing Stability: Unknown (3/6/2024)    Housing Stability Vital Sign     Unstable Housing in the Last Year: Patient declined        Previous Medications    ASCORBIC ACID (VITAMIN C) 1000 MG TABLET    Take 1 tablet by mouth daily    CHOLECALCIFEROL (VITAMIN D3) 125 MCG (5000 UT) CAPS    Take 1 capsule by mouth daily    COLESEVELAM (WELCHOL) 625 MG TABLET    Take 1 tablet by mouth 3 times daily as needed (diarrhea)    DICYCLOMINE (BENTYL) 10 MG CAPSULE    Take 1 capsule by mouth 3 times daily    ESCITALOPRAM (LEXAPRO) 20 MG TABLET    Take 1 tablet by mouth daily    LISINOPRIL (PRINIVIL;ZESTRIL) 20 MG TABLET    Take 1 tablet by mouth in the morning and at bedtime    MELATONIN 10 MG TABS    Take 1 tablet by mouth nightly as needed (sleep)    MIRABEGRON (MYRBETRIQ) 50 MG TB24    Take 50 mg by mouth daily    MULTIPLE VITAMINS-MINERALS (CENTRUM SILVER ADULT 50+) TABS    Take 1 tablet by mouth daily    NITROGLYCERIN (NITROSTAT) 0.4 MG SL TABLET    Place 1 tablet under the tongue every 5 minutes as needed for Chest pain place 0.4mg under the tongue every 5

## 2024-11-08 NOTE — ED TRIAGE NOTES
Patient arrives via EMS from home alone c/o unwitnessed fall last night. Reports she tripped over her dog and fell on L side. Denies hitting head, LOC, or taking blood thinners.  C/O L shoulder pain and limited ROM.    VSS en route. Patient declined pain medication en route.

## 2024-11-08 NOTE — ED NOTES
Bedside report given to Arabella RN to assume care at this time.     Arabella Grimes, ANGELA  11/08/24 0117

## 2024-11-11 NOTE — TELEPHONE ENCOUNTER
Patient contacted. Advised that appointment would be needed prior to being able to order HH due to insurance will require face-to-face visit for services. Patient is already scheduled for tomorrow at 3:20 PM. Patient is unsure if she will be able to keep this appointment due to her arm being in a sling and having difficulty getting in and out of the car. Advised to call office tomorrow if unable to keep appointment. Patient voiced understanding.

## 2024-11-12 ENCOUNTER — OFFICE VISIT (OUTPATIENT)
Dept: FAMILY MEDICINE CLINIC | Facility: CLINIC | Age: 89
End: 2024-11-12

## 2024-11-12 VITALS
HEART RATE: 82 BPM | OXYGEN SATURATION: 97 % | DIASTOLIC BLOOD PRESSURE: 80 MMHG | BODY MASS INDEX: 18.83 KG/M2 | HEIGHT: 62 IN | SYSTOLIC BLOOD PRESSURE: 116 MMHG

## 2024-11-12 DIAGNOSIS — S42.202D CLOSED FRACTURE OF PROXIMAL END OF LEFT HUMERUS WITH ROUTINE HEALING, UNSPECIFIED FRACTURE MORPHOLOGY, SUBSEQUENT ENCOUNTER: ICD-10-CM

## 2024-11-12 DIAGNOSIS — R54 FRAILTY SYNDROME IN GERIATRIC PATIENT: Primary | ICD-10-CM

## 2024-11-12 DIAGNOSIS — I10 PRIMARY HYPERTENSION: ICD-10-CM

## 2024-11-12 ASSESSMENT — ENCOUNTER SYMPTOMS: RESPIRATORY NEGATIVE: 1

## 2024-11-14 ENCOUNTER — TELEPHONE (OUTPATIENT)
Dept: FAMILY MEDICINE CLINIC | Facility: CLINIC | Age: 89
End: 2024-11-14

## 2024-11-14 NOTE — TELEPHONE ENCOUNTER
Laureen from [facility] health called and states that she was admitted for HH services today. Will have PT, OT, nursing, and is also adding social work services as well. Compared medication list to home medications, and states that the only difference is patient is taking Tylenol, and AZO Cranberry gummies (to help prevent UTI).   Can be reached at [phone].

## 2024-11-18 ENCOUNTER — OFFICE VISIT (OUTPATIENT)
Dept: ORTHOPEDIC SURGERY | Age: 89
End: 2024-11-18
Payer: MEDICARE

## 2024-11-18 DIAGNOSIS — S42.209A CLOSED FRACTURE OF PROXIMAL END OF HUMERUS, UNSPECIFIED FRACTURE MORPHOLOGY, UNSPECIFIED LATERALITY, INITIAL ENCOUNTER: Primary | ICD-10-CM

## 2024-11-18 DIAGNOSIS — S42.202A CLOSED FRACTURE OF PROXIMAL END OF LEFT HUMERUS, UNSPECIFIED FRACTURE MORPHOLOGY, INITIAL ENCOUNTER: Primary | ICD-10-CM

## 2024-11-18 PROCEDURE — 99204 OFFICE O/P NEW MOD 45 MIN: CPT | Performed by: PHYSICIAN ASSISTANT

## 2024-11-18 PROCEDURE — 1036F TOBACCO NON-USER: CPT | Performed by: PHYSICIAN ASSISTANT

## 2024-11-18 PROCEDURE — G8420 CALC BMI NORM PARAMETERS: HCPCS | Performed by: PHYSICIAN ASSISTANT

## 2024-11-18 PROCEDURE — G8427 DOCREV CUR MEDS BY ELIG CLIN: HCPCS | Performed by: PHYSICIAN ASSISTANT

## 2024-11-18 PROCEDURE — 1123F ACP DISCUSS/DSCN MKR DOCD: CPT | Performed by: PHYSICIAN ASSISTANT

## 2024-11-18 PROCEDURE — 1090F PRES/ABSN URINE INCON ASSESS: CPT | Performed by: PHYSICIAN ASSISTANT

## 2024-11-18 PROCEDURE — G8484 FLU IMMUNIZE NO ADMIN: HCPCS | Performed by: PHYSICIAN ASSISTANT

## 2024-11-18 PROCEDURE — M5027 MISC HANGING CAST SLING: HCPCS | Performed by: PHYSICIAN ASSISTANT

## 2024-11-18 RX ORDER — TRAMADOL HYDROCHLORIDE 50 MG/1
50 TABLET ORAL
Qty: 20 TABLET | Refills: 0 | Status: SHIPPED | OUTPATIENT
Start: 2024-11-18 | End: 2024-11-23

## 2024-11-18 NOTE — PROGRESS NOTES
Patient was fitted and instructed on a Hanging Cast Sling for the left shoulder. Patient read and signed documenting they understand and agree to [facility]'s current DME return policy.

## 2024-11-20 ENCOUNTER — TELEPHONE (OUTPATIENT)
Dept: FAMILY MEDICINE CLINIC | Facility: CLINIC | Age: 88
End: 2024-11-20

## 2024-11-20 NOTE — TELEPHONE ENCOUNTER
Massiel from [facility] called to let provider know social work initial visit was completed and no additional follow up needed  # [phone]

## 2024-11-21 NOTE — PROGRESS NOTES
Name: Ashley Campbell  YOB: 1932  Gender: female  MRN: [id number]    CC:   Chief Complaint   Patient presents with    Shoulder Pain     left   , Left shoulder(s) pain    HPI:  This patient presents with onset of Left superior shoulder pain that began with an injury.  Patient tripped over a dog on 11-8-2024.  Notes that she fell hit the lateral aspect of the shoulder.. The pain is sharp. They cannot raise their shoulder. No radiation of pain is noted. No parasthesia. No prior history of problems.  No prior issues.    Allergies   Allergen Reactions    Penicillins Rash     Past Medical History:   Diagnosis Date    Arrhythmia     medication controlled ; NSR on ekg dated 6-30-12-in EMR    Arthritis     tylenol as needed    Cancer (HCC) 06/2012    skin cancer removed from left leg    Chest pain 03/08/2016    Chronic pain     arthritis    Dyspnea 03/08/2016    GERD (gastroesophageal reflux disease)     well controlled with medication    Headache 1970    Hyperlipidemia 03/08/2016    Hypertension     well controlled with medication    Hyperthyroidism 2000    Irritable bowel syndrome 2000    Kidney stone     once    Nausea & vomiting     Other specified anemias 08/23/2023    Palpitations 03/08/2016    Supraventricular beat, premature 03/08/2016    Urinary incontinence 2015     Past Surgical History:   Procedure Laterality Date    APPENDECTOMY      BREAST BIOPSY      BREAST SURGERY      paula benign lumps     HYSTERECTOMY, VAGINAL  1972    OTHER SURGICAL HISTORY  6/2012    skin CA removed    NAOMI AND BSO (CERVIX REMOVED)      also, exp lap for endometriosis     Family History   Problem Relation Age of Onset    Dementia Mother     Arthritis Mother     Depression Mother     High Blood Pressure Mother     High Cholesterol Mother     Delayed Awakening Father     Heart Surgery Father     Cancer Father     Prostate Cancer Father      Social History     Socioeconomic History    Marital status:      Spouse name:

## 2024-11-27 ENCOUNTER — OFFICE VISIT (OUTPATIENT)
Dept: ORTHOPEDIC SURGERY | Age: 88
End: 2024-11-27
Payer: MEDICARE

## 2024-11-27 DIAGNOSIS — S42.202D CLOSED FRACTURE OF PROXIMAL END OF LEFT HUMERUS WITH ROUTINE HEALING, UNSPECIFIED FRACTURE MORPHOLOGY, SUBSEQUENT ENCOUNTER: Primary | ICD-10-CM

## 2024-11-27 PROCEDURE — 99213 OFFICE O/P EST LOW 20 MIN: CPT | Performed by: PHYSICIAN ASSISTANT

## 2024-11-27 PROCEDURE — G8484 FLU IMMUNIZE NO ADMIN: HCPCS | Performed by: PHYSICIAN ASSISTANT

## 2024-11-27 PROCEDURE — 1159F MED LIST DOCD IN RCRD: CPT | Performed by: PHYSICIAN ASSISTANT

## 2024-11-27 PROCEDURE — G8420 CALC BMI NORM PARAMETERS: HCPCS | Performed by: PHYSICIAN ASSISTANT

## 2024-11-27 PROCEDURE — 1123F ACP DISCUSS/DSCN MKR DOCD: CPT | Performed by: PHYSICIAN ASSISTANT

## 2024-11-27 PROCEDURE — G8427 DOCREV CUR MEDS BY ELIG CLIN: HCPCS | Performed by: PHYSICIAN ASSISTANT

## 2024-11-27 PROCEDURE — 1036F TOBACCO NON-USER: CPT | Performed by: ORTHOPAEDIC SURGERY

## 2024-11-27 PROCEDURE — 1090F PRES/ABSN URINE INCON ASSESS: CPT | Performed by: PHYSICIAN ASSISTANT

## 2024-11-27 PROCEDURE — 1160F RVW MEDS BY RX/DR IN RCRD: CPT | Performed by: PHYSICIAN ASSISTANT

## 2024-11-27 NOTE — PROGRESS NOTES
Name: Ashley Campbell  YOB: 1932  Gender: female  MRN: [id number]    CC:   Chief Complaint   Patient presents with    Follow-up     L Proximal Humerus Fx DOI 11/8/24    , Left shoulder(s) pain    HPI:  This patient presents for follow-up evaluation of left proximal humerus fracture.  Patient tripped over a dog on 11-8-2024.  She comes in today with sling not on appropriately.  She notes symptoms have been improving.  Has been doing OT with home health. Denies numbness and tingling.     Allergies   Allergen Reactions    Penicillins Rash     Past Medical History:   Diagnosis Date    Arrhythmia     medication controlled ; NSR on ekg dated 6-30-12-in EMR    Arthritis     tylenol as needed    Cancer (HCC) 06/2012    skin cancer removed from left leg    Chest pain 03/08/2016    Chronic pain     arthritis    Dyspnea 03/08/2016    GERD (gastroesophageal reflux disease)     well controlled with medication    Headache 1970    Hyperlipidemia 03/08/2016    Hypertension     well controlled with medication    Hyperthyroidism 2000    Irritable bowel syndrome 2000    Kidney stone     once    Nausea & vomiting     Other specified anemias 08/23/2023    Palpitations 03/08/2016    Supraventricular beat, premature 03/08/2016    Urinary incontinence 2015     Past Surgical History:   Procedure Laterality Date    APPENDECTOMY      BREAST BIOPSY      BREAST SURGERY      paula benign lumps     HYSTERECTOMY, VAGINAL  1972    OTHER SURGICAL HISTORY  6/2012    skin CA removed    NAOMI AND BSO (CERVIX REMOVED)      also, exp lap for endometriosis     Family History   Problem Relation Age of Onset    Dementia Mother     Arthritis Mother     Depression Mother     High Blood Pressure Mother     High Cholesterol Mother     Delayed Awakening Father     Heart Surgery Father     Cancer Father     Prostate Cancer Father      Social History     Socioeconomic History    Marital status:      Spouse name: Not on file    Number of

## 2024-12-03 ENCOUNTER — TELEPHONE (OUTPATIENT)
Dept: ORTHOPEDIC SURGERY | Age: 88
End: 2024-12-03

## 2024-12-03 NOTE — TELEPHONE ENCOUNTER
Pt  needs to move  her  Silviano  appt  to  12/18-    12/18  will be  3 weeks  from  11/27  and  he is  supposed  to  xr   she  can come  anytime...  but  a little  later  might  work  better

## 2024-12-10 ENCOUNTER — OFFICE VISIT (OUTPATIENT)
Dept: FAMILY MEDICINE CLINIC | Facility: CLINIC | Age: 88
End: 2024-12-10
Payer: MEDICARE

## 2024-12-10 VITALS
BODY MASS INDEX: 18.33 KG/M2 | WEIGHT: 99.6 LBS | HEIGHT: 62 IN | OXYGEN SATURATION: 97 % | DIASTOLIC BLOOD PRESSURE: 86 MMHG | SYSTOLIC BLOOD PRESSURE: 134 MMHG | HEART RATE: 71 BPM

## 2024-12-10 DIAGNOSIS — R19.04 LLQ ABDOMINAL MASS: Primary | ICD-10-CM

## 2024-12-10 LAB
ALBUMIN SERPL-MCNC: 3.5 G/DL (ref 3.2–4.6)
ALBUMIN/GLOB SERPL: 1.1 (ref 1–1.9)
ALP SERPL-CCNC: 89 U/L (ref 35–104)
ALT SERPL-CCNC: 10 U/L (ref 8–45)
ANION GAP SERPL CALC-SCNC: 11 MMOL/L (ref 7–16)
AST SERPL-CCNC: 19 U/L (ref 15–37)
BASOPHILS # BLD: 0 K/UL (ref 0–0.2)
BASOPHILS NFR BLD: 1 % (ref 0–2)
BILIRUB SERPL-MCNC: 0.5 MG/DL (ref 0–1.2)
BUN SERPL-MCNC: 9 MG/DL (ref 8–23)
CALCIUM SERPL-MCNC: 9.4 MG/DL (ref 8.8–10.2)
CHLORIDE SERPL-SCNC: 102 MMOL/L (ref 98–107)
CO2 SERPL-SCNC: 29 MMOL/L (ref 20–29)
CREAT SERPL-MCNC: 0.53 MG/DL (ref 0.6–1.1)
CRP SERPL-MCNC: <0.3 MG/DL (ref 0–0.4)
DIFFERENTIAL METHOD BLD: ABNORMAL
EOSINOPHIL # BLD: 0 K/UL (ref 0–0.8)
EOSINOPHIL NFR BLD: 1 % (ref 0.5–7.8)
ERYTHROCYTE [DISTWIDTH] IN BLOOD BY AUTOMATED COUNT: 13.9 % (ref 11.9–14.6)
ERYTHROCYTE [SEDIMENTATION RATE] IN BLOOD: 9 MM/HR (ref 0–30)
GLOBULIN SER CALC-MCNC: 3.2 G/DL (ref 2.3–3.5)
GLUCOSE SERPL-MCNC: 90 MG/DL (ref 70–99)
HCT VFR BLD AUTO: 39.9 % (ref 35.8–46.3)
HGB BLD-MCNC: 12.9 G/DL (ref 11.7–15.4)
IMM GRANULOCYTES # BLD AUTO: 0 K/UL (ref 0–0.5)
IMM GRANULOCYTES NFR BLD AUTO: 0 % (ref 0–5)
LYMPHOCYTES # BLD: 1.4 K/UL (ref 0.5–4.6)
LYMPHOCYTES NFR BLD: 33 % (ref 13–44)
MCH RBC QN AUTO: 30.8 PG (ref 26.1–32.9)
MCHC RBC AUTO-ENTMCNC: 32.3 G/DL (ref 31.4–35)
MCV RBC AUTO: 95.2 FL (ref 82–102)
MONOCYTES # BLD: 0.4 K/UL (ref 0.1–1.3)
MONOCYTES NFR BLD: 9 % (ref 4–12)
NEUTS SEG # BLD: 2.4 K/UL (ref 1.7–8.2)
NEUTS SEG NFR BLD: 56 % (ref 43–78)
NRBC # BLD: 0 K/UL (ref 0–0.2)
PLATELET # BLD AUTO: 198 K/UL (ref 150–450)
PMV BLD AUTO: 9.4 FL (ref 9.4–12.3)
POTASSIUM SERPL-SCNC: 3.6 MMOL/L (ref 3.5–5.1)
PROT SERPL-MCNC: 6.7 G/DL (ref 6.3–8.2)
RBC # BLD AUTO: 4.19 M/UL (ref 4.05–5.2)
SODIUM SERPL-SCNC: 141 MMOL/L (ref 136–145)
WBC # BLD AUTO: 4.2 K/UL (ref 4.3–11.1)

## 2024-12-10 PROCEDURE — 1036F TOBACCO NON-USER: CPT | Performed by: FAMILY MEDICINE

## 2024-12-10 PROCEDURE — 99213 OFFICE O/P EST LOW 20 MIN: CPT | Performed by: FAMILY MEDICINE

## 2024-12-10 PROCEDURE — 1159F MED LIST DOCD IN RCRD: CPT | Performed by: FAMILY MEDICINE

## 2024-12-10 PROCEDURE — G8484 FLU IMMUNIZE NO ADMIN: HCPCS | Performed by: FAMILY MEDICINE

## 2024-12-10 PROCEDURE — G8427 DOCREV CUR MEDS BY ELIG CLIN: HCPCS | Performed by: FAMILY MEDICINE

## 2024-12-10 PROCEDURE — 1090F PRES/ABSN URINE INCON ASSESS: CPT | Performed by: FAMILY MEDICINE

## 2024-12-10 PROCEDURE — G8419 CALC BMI OUT NRM PARAM NOF/U: HCPCS | Performed by: FAMILY MEDICINE

## 2024-12-10 PROCEDURE — 1123F ACP DISCUSS/DSCN MKR DOCD: CPT | Performed by: FAMILY MEDICINE

## 2024-12-10 ASSESSMENT — ENCOUNTER SYMPTOMS: RESPIRATORY NEGATIVE: 1

## 2024-12-10 NOTE — PROGRESS NOTES
PELVIS W IV CONTRAST Additional Contrast? Radiologist Recommendation; Future  -     CBC with Auto Differential; Future  -     Comprehensive Metabolic Panel; Future  -     C-Reactive Protein; Future  -     Sedimentation Rate; Future     Patient has intermittent for appetite, she has access to dietary supplements, I have advised she take advantage of this.    No follow-ups on file.       Kenneth Barlow MD

## 2024-12-12 ENCOUNTER — TELEPHONE (OUTPATIENT)
Dept: FAMILY MEDICINE CLINIC | Facility: CLINIC | Age: 88
End: 2024-12-12

## 2024-12-12 NOTE — TELEPHONE ENCOUNTER
Yuliana with [facility] called and said the nursing visits were only approved through 1/12. She is requesting vo for more nursing visits and a home health aid. She thinks Ms. Campbell would really benefit from this.    [phone]

## 2024-12-12 NOTE — TELEPHONE ENCOUNTER
Per Dr. Barlow; OK to approve orders.    Yuliana contacted and advised provider approved orders. Voiced understanding and thanks.

## 2024-12-15 ENCOUNTER — PATIENT MESSAGE (OUTPATIENT)
Dept: FAMILY MEDICINE CLINIC | Facility: CLINIC | Age: 88
End: 2024-12-15

## 2024-12-15 ENCOUNTER — PATIENT MESSAGE (OUTPATIENT)
Age: 88
End: 2024-12-15

## 2024-12-16 RX ORDER — POTASSIUM CHLORIDE 750 MG/1
10 TABLET, EXTENDED RELEASE ORAL 2 TIMES DAILY
COMMUNITY
Start: 2024-12-14

## 2024-12-17 ENCOUNTER — PATIENT MESSAGE (OUTPATIENT)
Dept: FAMILY MEDICINE CLINIC | Facility: CLINIC | Age: 88
End: 2024-12-17

## 2024-12-17 ENCOUNTER — OFFICE VISIT (OUTPATIENT)
Dept: FAMILY MEDICINE CLINIC | Facility: CLINIC | Age: 88
End: 2024-12-17
Payer: MEDICARE

## 2024-12-17 VITALS
OXYGEN SATURATION: 98 % | SYSTOLIC BLOOD PRESSURE: 110 MMHG | BODY MASS INDEX: 18.44 KG/M2 | HEART RATE: 75 BPM | HEIGHT: 62 IN | DIASTOLIC BLOOD PRESSURE: 72 MMHG | WEIGHT: 100.2 LBS

## 2024-12-17 DIAGNOSIS — K22.70 BARRETT'S ESOPHAGUS WITHOUT DYSPLASIA: ICD-10-CM

## 2024-12-17 DIAGNOSIS — E78.2 MIXED HYPERLIPIDEMIA: ICD-10-CM

## 2024-12-17 DIAGNOSIS — E87.6 HYPOKALEMIA: Primary | ICD-10-CM

## 2024-12-17 LAB
ANION GAP SERPL CALC-SCNC: 10 MMOL/L (ref 7–16)
BUN SERPL-MCNC: 13 MG/DL (ref 8–23)
CALCIUM SERPL-MCNC: 9 MG/DL (ref 8.8–10.2)
CHLORIDE SERPL-SCNC: 102 MMOL/L (ref 98–107)
CO2 SERPL-SCNC: 28 MMOL/L (ref 20–29)
CREAT SERPL-MCNC: 0.64 MG/DL (ref 0.6–1.1)
GLUCOSE SERPL-MCNC: 89 MG/DL (ref 70–99)
POTASSIUM SERPL-SCNC: 3.9 MMOL/L (ref 3.5–5.1)
SODIUM SERPL-SCNC: 139 MMOL/L (ref 136–145)

## 2024-12-17 PROCEDURE — 1160F RVW MEDS BY RX/DR IN RCRD: CPT | Performed by: FAMILY MEDICINE

## 2024-12-17 PROCEDURE — G8419 CALC BMI OUT NRM PARAM NOF/U: HCPCS | Performed by: FAMILY MEDICINE

## 2024-12-17 PROCEDURE — G8427 DOCREV CUR MEDS BY ELIG CLIN: HCPCS | Performed by: FAMILY MEDICINE

## 2024-12-17 PROCEDURE — 1090F PRES/ABSN URINE INCON ASSESS: CPT | Performed by: FAMILY MEDICINE

## 2024-12-17 PROCEDURE — 1123F ACP DISCUSS/DSCN MKR DOCD: CPT | Performed by: FAMILY MEDICINE

## 2024-12-17 PROCEDURE — 1036F TOBACCO NON-USER: CPT | Performed by: FAMILY MEDICINE

## 2024-12-17 PROCEDURE — 1159F MED LIST DOCD IN RCRD: CPT | Performed by: FAMILY MEDICINE

## 2024-12-17 PROCEDURE — 99213 OFFICE O/P EST LOW 20 MIN: CPT | Performed by: FAMILY MEDICINE

## 2024-12-17 PROCEDURE — G8484 FLU IMMUNIZE NO ADMIN: HCPCS | Performed by: FAMILY MEDICINE

## 2024-12-17 RX ORDER — COLESEVELAM 180 1/1
625 TABLET ORAL 3 TIMES DAILY PRN
Qty: 270 TABLET | Refills: 0 | Status: SHIPPED | OUTPATIENT
Start: 2024-12-17 | End: 2025-03-17

## 2024-12-17 RX ORDER — COLESEVELAM 180 1/1
625 TABLET ORAL 3 TIMES DAILY PRN
Qty: 270 TABLET | Refills: 3 | Status: CANCELLED | OUTPATIENT
Start: 2024-12-17 | End: 2025-12-12

## 2024-12-17 RX ORDER — OMEPRAZOLE 40 MG/1
40 CAPSULE, DELAYED RELEASE ORAL DAILY
Qty: 90 CAPSULE | Refills: 3 | Status: SHIPPED | OUTPATIENT
Start: 2024-12-17 | End: 2025-12-17

## 2024-12-17 RX ORDER — COLESEVELAM 180 1/1
625 TABLET ORAL 3 TIMES DAILY PRN
Qty: 270 TABLET | Refills: 3 | Status: SHIPPED | OUTPATIENT
Start: 2024-12-17 | End: 2024-12-17 | Stop reason: SDUPTHER

## 2024-12-17 ASSESSMENT — ENCOUNTER SYMPTOMS: RESPIRATORY NEGATIVE: 1

## 2024-12-17 NOTE — PROGRESS NOTES
(L) 0.60 - 1.10 MG/DL    Est, Glom Filt Rate 87 >60 ml/min/1.73m2    Calcium 9.4 8.8 - 10.2 MG/DL    Total Bilirubin 0.5 0.0 - 1.2 MG/DL    ALT 10 8 - 45 U/L    AST 19 15 - 37 U/L    Alk Phosphatase 89 35 - 104 U/L    Total Protein 6.7 6.3 - 8.2 g/dL    Albumin 3.5 3.2 - 4.6 g/dL    Globulin 3.2 2.3 - 3.5 g/dL    Albumin/Globulin Ratio 1.1 1.0 - 1.9     CBC with Auto Differential    Collection Time: 12/10/24  3:15 PM   Result Value Ref Range    WBC 4.2 (L) 4.3 - 11.1 K/uL    RBC 4.19 4.05 - 5.2 M/uL    Hemoglobin 12.9 11.7 - 15.4 g/dL    Hematocrit 39.9 35.8 - 46.3 %    MCV 95.2 82 - 102 FL    MCH 30.8 26.1 - 32.9 PG    MCHC 32.3 31.4 - 35.0 g/dL    RDW 13.9 11.9 - 14.6 %    Platelets 198 150 - 450 K/uL    MPV 9.4 9.4 - 12.3 FL    nRBC 0.00 0.0 - 0.2 K/uL    Differential Type AUTOMATED      Neutrophils % 56 43 - 78 %    Lymphocytes % 33 13 - 44 %    Monocytes % 9 4.0 - 12.0 %    Eosinophils % 1 0.5 - 7.8 %    Basophils % 1 0.0 - 2.0 %    Immature Granulocytes % 0 0.0 - 5.0 %    Neutrophils Absolute 2.4 1.7 - 8.2 K/UL    Lymphocytes Absolute 1.4 0.5 - 4.6 K/UL    Monocytes Absolute 0.4 0.1 - 1.3 K/UL    Eosinophils Absolute 0.0 0.0 - 0.8 K/UL    Basophils Absolute 0.0 0.0 - 0.2 K/UL    Immature Granulocytes Absolute 0.0 0.0 - 0.5 K/UL       No results found for any visits on 12/17/24.     ASSESSMENT and PLAN    1. Mixed hyperlipidemia  -     colesevelam (WELCHOL) 625 MG tablet; Take 1 tablet by mouth 3 times daily as needed (diarrhea), Disp-270 tablet, R-3Normal  2. 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  Patient has had 3 EGDs since 2016 with a history of Garcia's esophagus currently on PPI daily  Orders:  -     omeprazole (PRILOSEC) 40 MG delayed release capsule; Take 1 capsule by mouth daily, Disp-90 capsule, R-3Normal  3. Hypokalemia  -     Basic Metabolic Panel; Future     Encouraged to maintain proper

## 2024-12-18 ENCOUNTER — OFFICE VISIT (OUTPATIENT)
Dept: ORTHOPEDIC SURGERY | Age: 88
End: 2024-12-18
Payer: MEDICARE

## 2024-12-18 DIAGNOSIS — S42.202D CLOSED FRACTURE OF PROXIMAL END OF LEFT HUMERUS WITH ROUTINE HEALING, UNSPECIFIED FRACTURE MORPHOLOGY, SUBSEQUENT ENCOUNTER: Primary | ICD-10-CM

## 2024-12-18 PROCEDURE — 1090F PRES/ABSN URINE INCON ASSESS: CPT | Performed by: PHYSICIAN ASSISTANT

## 2024-12-18 PROCEDURE — 99213 OFFICE O/P EST LOW 20 MIN: CPT | Performed by: PHYSICIAN ASSISTANT

## 2024-12-18 PROCEDURE — 1036F TOBACCO NON-USER: CPT | Performed by: ORTHOPAEDIC SURGERY

## 2024-12-18 PROCEDURE — 1123F ACP DISCUSS/DSCN MKR DOCD: CPT | Performed by: PHYSICIAN ASSISTANT

## 2024-12-18 PROCEDURE — G8419 CALC BMI OUT NRM PARAM NOF/U: HCPCS | Performed by: PHYSICIAN ASSISTANT

## 2024-12-18 PROCEDURE — G8427 DOCREV CUR MEDS BY ELIG CLIN: HCPCS | Performed by: PHYSICIAN ASSISTANT

## 2024-12-18 PROCEDURE — G8484 FLU IMMUNIZE NO ADMIN: HCPCS | Performed by: PHYSICIAN ASSISTANT

## 2024-12-18 PROCEDURE — 1159F MED LIST DOCD IN RCRD: CPT | Performed by: PHYSICIAN ASSISTANT

## 2024-12-18 PROCEDURE — 1160F RVW MEDS BY RX/DR IN RCRD: CPT | Performed by: PHYSICIAN ASSISTANT

## 2024-12-18 NOTE — PROGRESS NOTES
Systems  Also noted on the patient medical history form on the chart and are reviewed today.  Pertinent positives and negatives are addressed with the patient, particularly those related to musculoskeletal concerns.  Non-orthopaedic concerns were referred back to the primary care physician.    PE:    GEN: General appearance is that of a healthy patient, alert and oriented, in no distress. WDWN.  HEENT:  Normocephalic, atraumatic.  Extraocular muscles are intact.  Neck:  Supple, no lymphadenopathy.  Heart:  Regular pulse exam on all extremities.  Good color and warmth noted.  Lungs:  Normal non-labored breathing with no obvious shortness of breath.  Abdomen:  WNL's.  Skin:  No signs of rash.    Pysch: Answers questions appropriately, AO X 3.  MSK:  Cervical spine: No tenderness. Good active motion. Negative Spurling's maneuver     SHOULDER   Left (involved)  Right   Skin Intact Intact   Radial Pulses 2+ symmetrical  2+ symmetrical   Myotomes Normal Normal   Dermatomes  Normal Normal   ROM , abduction 75, ER 30 Full   Strength Weakness in the scapular plane and external rotation 4+/5 No weakness   Atrophy None noted None noted   Effusion/Swelling  Negative None   Palpation Negative No Tenderness   Bicep Tendon Rupture  Negative Negative   Lift off test Not tested Not tested   Crossed Arm Adduction Test Not tested Not tested   Instability/Ant. Apprehension Test not tested None   Impingement Not tested Not tested        BUE exam shows normal sensation to light touch in median, radial, ulnar, axillary and musculocutanesous nerves. All appear intact.    X-rays:   Grashey and outlet radiographs of the shoulder were obtained and reviewed in office today.  They show interval callus formation of varus impacted proximal humerus fracture. In good position for healing.          Impression:     ICD-10-CM    1. Closed fracture of proximal end of left humerus with routine healing, unspecified fracture morphology, subsequent

## 2024-12-19 ENCOUNTER — HOSPITAL ENCOUNTER (OUTPATIENT)
Dept: CT IMAGING | Age: 88
Discharge: HOME | End: 2024-12-22
Attending: FAMILY MEDICINE
Payer: MEDICARE

## 2024-12-19 DIAGNOSIS — R19.04 LLQ ABDOMINAL MASS: ICD-10-CM

## 2024-12-19 PROCEDURE — 6360000004 HC RX CONTRAST MEDICATION: Performed by: FAMILY MEDICINE

## 2024-12-19 PROCEDURE — 74177 CT ABD & PELVIS W/CONTRAST: CPT | Performed by: RADIOLOGY

## 2024-12-19 PROCEDURE — 74177 CT ABD & PELVIS W/CONTRAST: CPT

## 2024-12-19 RX ORDER — DIATRIZOATE MEGLUMINE AND DIATRIZOATE SODIUM 660; 100 MG/ML; MG/ML
15 SOLUTION ORAL; RECTAL
Status: DISCONTINUED | OUTPATIENT
Start: 2024-12-19 | End: 2024-12-23 | Stop reason: HOSPADM

## 2024-12-19 RX ORDER — IOPAMIDOL 755 MG/ML
100 INJECTION, SOLUTION INTRAVASCULAR
Status: COMPLETED | OUTPATIENT
Start: 2024-12-19 | End: 2024-12-19

## 2024-12-19 RX ADMIN — DIATRIZOATE MEGLUMINE AND DIATRIZOATE SODIUM 15 ML: 660; 100 LIQUID ORAL; RECTAL at 10:59

## 2024-12-19 RX ADMIN — IOPAMIDOL 100 ML: 755 INJECTION, SOLUTION INTRAVENOUS at 10:59

## 2024-12-20 RX ORDER — CHOLESTYRAMINE 4 G/9G
1 POWDER, FOR SUSPENSION ORAL 2 TIMES DAILY
Qty: 60 PACKET | Refills: 5 | Status: SHIPPED | OUTPATIENT
Start: 2024-12-20 | End: 2025-06-18

## 2024-12-23 ENCOUNTER — OFFICE VISIT (OUTPATIENT)
Dept: FAMILY MEDICINE CLINIC | Facility: CLINIC | Age: 88
End: 2024-12-23
Payer: MEDICARE

## 2024-12-23 VITALS
OXYGEN SATURATION: 99 % | DIASTOLIC BLOOD PRESSURE: 76 MMHG | HEART RATE: 69 BPM | SYSTOLIC BLOOD PRESSURE: 118 MMHG | WEIGHT: 98 LBS | HEIGHT: 62 IN | BODY MASS INDEX: 18.03 KG/M2

## 2024-12-23 DIAGNOSIS — R19.04 LLQ ABDOMINAL MASS: Primary | ICD-10-CM

## 2024-12-23 DIAGNOSIS — E87.6 HYPOKALEMIA: ICD-10-CM

## 2024-12-23 LAB
ANION GAP SERPL CALC-SCNC: 12 MMOL/L (ref 7–16)
BUN SERPL-MCNC: 10 MG/DL (ref 8–23)
CALCIUM SERPL-MCNC: 9.6 MG/DL (ref 8.8–10.2)
CHLORIDE SERPL-SCNC: 100 MMOL/L (ref 98–107)
CO2 SERPL-SCNC: 26 MMOL/L (ref 20–29)
CREAT SERPL-MCNC: 0.65 MG/DL (ref 0.6–1.1)
GLUCOSE SERPL-MCNC: 83 MG/DL (ref 70–99)
POTASSIUM SERPL-SCNC: 4.7 MMOL/L (ref 3.5–5.1)
SODIUM SERPL-SCNC: 138 MMOL/L (ref 136–145)

## 2024-12-23 PROCEDURE — 1036F TOBACCO NON-USER: CPT | Performed by: FAMILY MEDICINE

## 2024-12-23 PROCEDURE — 1090F PRES/ABSN URINE INCON ASSESS: CPT | Performed by: FAMILY MEDICINE

## 2024-12-23 PROCEDURE — G8419 CALC BMI OUT NRM PARAM NOF/U: HCPCS | Performed by: FAMILY MEDICINE

## 2024-12-23 PROCEDURE — G8427 DOCREV CUR MEDS BY ELIG CLIN: HCPCS | Performed by: FAMILY MEDICINE

## 2024-12-23 PROCEDURE — G8484 FLU IMMUNIZE NO ADMIN: HCPCS | Performed by: FAMILY MEDICINE

## 2024-12-23 PROCEDURE — 1123F ACP DISCUSS/DSCN MKR DOCD: CPT | Performed by: FAMILY MEDICINE

## 2024-12-23 PROCEDURE — 1159F MED LIST DOCD IN RCRD: CPT | Performed by: FAMILY MEDICINE

## 2024-12-23 PROCEDURE — 99213 OFFICE O/P EST LOW 20 MIN: CPT | Performed by: FAMILY MEDICINE

## 2024-12-23 ASSESSMENT — ENCOUNTER SYMPTOMS
RESPIRATORY NEGATIVE: 1
DIARRHEA: 1

## 2024-12-23 NOTE — PROGRESS NOTES
PROGRESS NOTE    SUBJECTIVE:   Ashley Campbell is a 89 y.o. female seen for a follow up visit regarding   Chief Complaint   Patient presents with    Results     Here to discuss CT results     Ear Fullness     Reports both ears feel like they are stopped up; left worse than right        HPI:  Here today for follow-up to go over results of CT scan.  Unfortunately, her report is not back yet.  Overall she is doing relatively well, continues to have some loose stools.  She was unable to get her WelChol which she had been taking for her loose stools, with success.  I sent a substitute, cholestyramine last week.  She has not picked this up yet.         Reviewed and updated this visit by provider:           Review of Systems   Respiratory: Negative.     Cardiovascular: Negative.    Gastrointestinal:  Positive for diarrhea.          OBJECTIVE:  Vitals:    12/23/24 1326   BP: 118/76   Site: Left Upper Arm   Cuff Size: Small Adult   Pulse: 69   SpO2: 99%   Weight: 44.5 kg (98 lb)   Height: 1.575 m (5' 2.01\")        Physical Exam   General: Alert and oriented x3, frail appearing but in no distress presently  Neck: No adenopathy, thyromegaly or thyroid nodules  Pulmonary: Normal effort, good airflow, no rales or rhonchi  CVS: Regular rate and rhythm, normal S1, S2, no S3 or S4, no murmurs; no carotid bruits, 2+ pedal pulses  Abdomen: Nondistended with normal bowel sounds, palpable mass in the left lower quadrant, mobile, minimal discomfort    Medical problems and test results were reviewed with the patient today.     Recent Results (from the past 672 hour(s))   Sedimentation Rate    Collection Time: 12/10/24  3:15 PM   Result Value Ref Range    Sed Rate, Automated 9 0 - 30 mm/hr   C-Reactive Protein    Collection Time: 12/10/24  3:15 PM   Result Value Ref Range    CRP <0.3 0.0 - 0.4 mg/dL   Comprehensive Metabolic Panel    Collection Time: 12/10/24  3:15 PM   Result Value Ref Range    Sodium 141 136 - 145 mmol/L    Potassium

## 2025-01-03 ENCOUNTER — OFFICE VISIT (OUTPATIENT)
Dept: FAMILY MEDICINE CLINIC | Facility: CLINIC | Age: 89
End: 2025-01-03

## 2025-01-03 VITALS
HEIGHT: 62 IN | HEART RATE: 65 BPM | DIASTOLIC BLOOD PRESSURE: 76 MMHG | SYSTOLIC BLOOD PRESSURE: 136 MMHG | OXYGEN SATURATION: 97 % | WEIGHT: 100.8 LBS | BODY MASS INDEX: 18.55 KG/M2

## 2025-01-03 DIAGNOSIS — K40.90 LEFT INGUINAL HERNIA: Primary | ICD-10-CM

## 2025-01-03 ASSESSMENT — PATIENT HEALTH QUESTIONNAIRE - PHQ9
4. FEELING TIRED OR HAVING LITTLE ENERGY: NOT AT ALL
8. MOVING OR SPEAKING SO SLOWLY THAT OTHER PEOPLE COULD HAVE NOTICED. OR THE OPPOSITE, BEING SO FIGETY OR RESTLESS THAT YOU HAVE BEEN MOVING AROUND A LOT MORE THAN USUAL: NOT AT ALL
5. POOR APPETITE OR OVEREATING: NOT AT ALL
6. FEELING BAD ABOUT YOURSELF - OR THAT YOU ARE A FAILURE OR HAVE LET YOURSELF OR YOUR FAMILY DOWN: NOT AT ALL
1. LITTLE INTEREST OR PLEASURE IN DOING THINGS: NOT AT ALL
7. TROUBLE CONCENTRATING ON THINGS, SUCH AS READING THE NEWSPAPER OR WATCHING TELEVISION: NOT AT ALL
SUM OF ALL RESPONSES TO PHQ QUESTIONS 1-9: 0
2. FEELING DOWN, DEPRESSED OR HOPELESS: NOT AT ALL
SUM OF ALL RESPONSES TO PHQ QUESTIONS 1-9: 0
SUM OF ALL RESPONSES TO PHQ QUESTIONS 1-9: 0
3. TROUBLE FALLING OR STAYING ASLEEP: NOT AT ALL
10. IF YOU CHECKED OFF ANY PROBLEMS, HOW DIFFICULT HAVE THESE PROBLEMS MADE IT FOR YOU TO DO YOUR WORK, TAKE CARE OF THINGS AT HOME, OR GET ALONG WITH OTHER PEOPLE: NOT DIFFICULT AT ALL
SUM OF ALL RESPONSES TO PHQ9 QUESTIONS 1 & 2: 0
9. THOUGHTS THAT YOU WOULD BE BETTER OFF DEAD, OR OF HURTING YOURSELF: NOT AT ALL
SUM OF ALL RESPONSES TO PHQ QUESTIONS 1-9: 0

## 2025-01-03 ASSESSMENT — ENCOUNTER SYMPTOMS: RESPIRATORY NEGATIVE: 1

## 2025-01-03 NOTE — PROGRESS NOTES
PROGRESS NOTE    SUBJECTIVE:   Ashley Campbell is a 89 y.o. female seen for a follow up visit regarding   Chief Complaint   Patient presents with    Results     Here to discuss CT results and ongoing symptoms        HPI:  Patient comes in today to discuss results of her abdominal CT scan.  Presently she is doing well, reports that she feels better than she has in a good while.         Reviewed and updated this visit by provider:  Tobacco  Allergies  Meds  Problems  Med Hx  Surg Hx  Fam Hx           Review of Systems   Respiratory: Negative.     Cardiovascular: Negative.           OBJECTIVE:  Vitals:    01/03/25 1127   BP: 136/76   Site: Left Upper Arm   Cuff Size: Small Adult   Pulse: 65   SpO2: 97%   Weight: 45.7 kg (100 lb 12.8 oz)   Height: 1.575 m (5' 2.01\")        Physical Exam   General: Alert and oriented x3, frail appearing  Neck: No adenopathy, thyromegaly or thyroid nodules  Pulmonary: Normal effort, good airflow, no rales or rhonchi  CVS: Regular rate and rhythm, normal S1, S2, no S3 or S4, no murmurs; no carotid bruits, 2+ pedal pulses  Abdomen: Nondistended, soft, left lower quadrant bulging mass unchanged    CT scan confirms left lower quadrant hernia with loop of bowel    Medical problems and test results were reviewed with the patient today.     Recent Results (from the past 672 hour(s))   Sedimentation Rate    Collection Time: 12/10/24  3:15 PM   Result Value Ref Range    Sed Rate, Automated 9 0 - 30 mm/hr   C-Reactive Protein    Collection Time: 12/10/24  3:15 PM   Result Value Ref Range    CRP <0.3 0.0 - 0.4 mg/dL   Comprehensive Metabolic Panel    Collection Time: 12/10/24  3:15 PM   Result Value Ref Range    Sodium 141 136 - 145 mmol/L    Potassium 3.6 3.5 - 5.1 mmol/L    Chloride 102 98 - 107 mmol/L    CO2 29 20 - 29 mmol/L    Anion Gap 11 7 - 16 mmol/L    Glucose 90 70 - 99 mg/dL    BUN 9 8 - 23 MG/DL    Creatinine 0.53 (L) 0.60 - 1.10 MG/DL    Est, Glom Filt Rate 87 >60 ml/min/1.73m2

## 2025-01-14 ENCOUNTER — OFFICE VISIT (OUTPATIENT)
Dept: FAMILY MEDICINE CLINIC | Facility: CLINIC | Age: 89
End: 2025-01-14
Payer: MEDICARE

## 2025-01-14 VITALS
HEART RATE: 69 BPM | DIASTOLIC BLOOD PRESSURE: 80 MMHG | SYSTOLIC BLOOD PRESSURE: 124 MMHG | BODY MASS INDEX: 18.74 KG/M2 | OXYGEN SATURATION: 97 % | WEIGHT: 101.8 LBS | HEIGHT: 62 IN

## 2025-01-14 DIAGNOSIS — R54 FRAILTY: ICD-10-CM

## 2025-01-14 DIAGNOSIS — R29.6 FREQUENT FALLS: ICD-10-CM

## 2025-01-14 DIAGNOSIS — S22.31XD CLOSED FRACTURE OF ONE RIB OF RIGHT SIDE WITH ROUTINE HEALING, SUBSEQUENT ENCOUNTER: Primary | ICD-10-CM

## 2025-01-14 PROCEDURE — 1036F TOBACCO NON-USER: CPT | Performed by: FAMILY MEDICINE

## 2025-01-14 PROCEDURE — 1159F MED LIST DOCD IN RCRD: CPT | Performed by: FAMILY MEDICINE

## 2025-01-14 PROCEDURE — 99213 OFFICE O/P EST LOW 20 MIN: CPT | Performed by: FAMILY MEDICINE

## 2025-01-14 PROCEDURE — 1160F RVW MEDS BY RX/DR IN RCRD: CPT | Performed by: FAMILY MEDICINE

## 2025-01-14 PROCEDURE — 1090F PRES/ABSN URINE INCON ASSESS: CPT | Performed by: FAMILY MEDICINE

## 2025-01-14 PROCEDURE — 1123F ACP DISCUSS/DSCN MKR DOCD: CPT | Performed by: FAMILY MEDICINE

## 2025-01-14 PROCEDURE — G8427 DOCREV CUR MEDS BY ELIG CLIN: HCPCS | Performed by: FAMILY MEDICINE

## 2025-01-14 PROCEDURE — G8420 CALC BMI NORM PARAMETERS: HCPCS | Performed by: FAMILY MEDICINE

## 2025-01-14 SDOH — ECONOMIC STABILITY: FOOD INSECURITY: WITHIN THE PAST 12 MONTHS, THE FOOD YOU BOUGHT JUST DIDN'T LAST AND YOU DIDN'T HAVE MONEY TO GET MORE.: NEVER TRUE

## 2025-01-14 SDOH — ECONOMIC STABILITY: FOOD INSECURITY: WITHIN THE PAST 12 MONTHS, YOU WORRIED THAT YOUR FOOD WOULD RUN OUT BEFORE YOU GOT MONEY TO BUY MORE.: NEVER TRUE

## 2025-01-14 ASSESSMENT — ENCOUNTER SYMPTOMS: RESPIRATORY NEGATIVE: 1

## 2025-01-14 NOTE — PROGRESS NOTES
PROGRESS NOTE    SUBJECTIVE:   Ashley Campbell is a 89 y.o. female seen for a follow up visit regarding   Chief Complaint   Patient presents with    ED Follow-up     Was seen at ED on 1/11 for fall/rib fracture and 1/8 for fall        HPI:  Here today for follow-up after another fall.  This time she suffered fractured rib.  No head trauma.         Reviewed and updated this visit by provider:  Tobacco  Allergies  Meds  Problems  Med Hx  Surg Hx  Fam Hx           Review of Systems   Respiratory: Negative.     Cardiovascular: Negative.           OBJECTIVE:  Vitals:    01/14/25 1309   BP: 124/80   Site: Left Upper Arm   Cuff Size: Medium Adult   Pulse: 69   SpO2: 97%   Weight: 46.2 kg (101 lb 12.8 oz)   Height: 1.575 m (5' 2.01\")        Physical Exam   General: Alert and oriented x3, frail appearing  Neck: No adenopathy, thyromegaly or thyroid nodules  Pulmonary: Normal effort, good airflow, no rales or rhonchi  CVS: Regular rate and rhythm, normal S1, S2, no S3 or S4, no murmurs; no carotid bruits, 2+ pedal pulses      Medical problems and test results were reviewed with the patient today.     Recent Results (from the past 672 hour(s))   Basic Metabolic Panel    Collection Time: 12/23/24  2:05 PM   Result Value Ref Range    Sodium 138 136 - 145 mmol/L    Potassium 4.7 3.5 - 5.1 mmol/L    Chloride 100 98 - 107 mmol/L    CO2 26 20 - 29 mmol/L    Anion Gap 12 7 - 16 mmol/L    Glucose 83 70 - 99 mg/dL    BUN 10 8 - 23 MG/DL    Creatinine 0.65 0.60 - 1.10 MG/DL    Est, Glom Filt Rate 83 >60 ml/min/1.73m2    Calcium 9.6 8.8 - 10.2 MG/DL       No results found for any visits on 01/14/25.     ASSESSMENT and PLAN    1. Closed fracture of one rib of right side with routine healing, subsequent encounter  2. Frequent falls  3. Frailty     Patient is here today with a friend from [place].  We had a lengthy discussion about the need to be in an assisted living facility or possibly even a nursing home.  Patient is considering

## 2025-01-21 DIAGNOSIS — I10 PRIMARY HYPERTENSION: ICD-10-CM

## 2025-01-22 RX ORDER — LISINOPRIL 20 MG/1
20 TABLET ORAL 2 TIMES DAILY
Qty: 180 TABLET | Refills: 3 | Status: SHIPPED | OUTPATIENT
Start: 2025-01-22

## 2025-01-22 NOTE — TELEPHONE ENCOUNTER
Requested Prescriptions     Pending Prescriptions Disp Refills    lisinopril (PRINIVIL;ZESTRIL) 20 MG tablet [Pharmacy Med Name: LISINOPRIL 20MG TABLETS] 180 tablet 3     Sig: TAKE 1 TABLET BY MOUTH IN THE MORNING AND AT BEDTIME     Verified rx. Last OV 11/05/24. Erx to pharm on file.

## 2025-01-30 ENCOUNTER — OFFICE VISIT (OUTPATIENT)
Dept: FAMILY MEDICINE CLINIC | Facility: CLINIC | Age: 89
End: 2025-01-30
Payer: MEDICARE

## 2025-01-30 VITALS
WEIGHT: 100.2 LBS | BODY MASS INDEX: 18.44 KG/M2 | RESPIRATION RATE: 16 BRPM | SYSTOLIC BLOOD PRESSURE: 168 MMHG | HEIGHT: 62 IN | DIASTOLIC BLOOD PRESSURE: 82 MMHG

## 2025-01-30 DIAGNOSIS — F33.1 MAJOR DEPRESSIVE DISORDER, RECURRENT, MODERATE (HCC): ICD-10-CM

## 2025-01-30 DIAGNOSIS — I10 PRIMARY HYPERTENSION: ICD-10-CM

## 2025-01-30 PROCEDURE — 99214 OFFICE O/P EST MOD 30 MIN: CPT | Performed by: FAMILY MEDICINE

## 2025-01-30 PROCEDURE — G8427 DOCREV CUR MEDS BY ELIG CLIN: HCPCS | Performed by: FAMILY MEDICINE

## 2025-01-30 PROCEDURE — 1036F TOBACCO NON-USER: CPT | Performed by: FAMILY MEDICINE

## 2025-01-30 PROCEDURE — G8419 CALC BMI OUT NRM PARAM NOF/U: HCPCS | Performed by: FAMILY MEDICINE

## 2025-01-30 PROCEDURE — 1159F MED LIST DOCD IN RCRD: CPT | Performed by: FAMILY MEDICINE

## 2025-01-30 PROCEDURE — 1090F PRES/ABSN URINE INCON ASSESS: CPT | Performed by: FAMILY MEDICINE

## 2025-01-30 PROCEDURE — 1160F RVW MEDS BY RX/DR IN RCRD: CPT | Performed by: FAMILY MEDICINE

## 2025-01-30 PROCEDURE — 1123F ACP DISCUSS/DSCN MKR DOCD: CPT | Performed by: FAMILY MEDICINE

## 2025-01-30 PROCEDURE — 1125F AMNT PAIN NOTED PAIN PRSNT: CPT | Performed by: FAMILY MEDICINE

## 2025-01-30 RX ORDER — HYDROCHLOROTHIAZIDE 12.5 MG/1
12.5 CAPSULE ORAL DAILY
COMMUNITY

## 2025-01-30 RX ORDER — METHENAMINE, SODIUM PHOSPHATE, MONOBASIC, MONOHYDRATE, PHENYL SALICYLATE, METHYLENE BLUE, AND HYOSCYAMINE SULFATE 118; 40.8; 36; 10; .12 MG/1; MG/1; MG/1; MG/1; MG/1
CAPSULE ORAL
COMMUNITY

## 2025-01-30 RX ORDER — LISINOPRIL 20 MG/1
20 TABLET ORAL 2 TIMES DAILY
Qty: 180 TABLET | Refills: 3 | Status: SHIPPED | OUTPATIENT
Start: 2025-01-30

## 2025-01-30 RX ORDER — LIDOCAINE HYDROCHLORIDE 40 MG/ML
SOLUTION TOPICAL PRN
COMMUNITY

## 2025-01-30 RX ORDER — ENALAPRIL MALEATE AND HYDROCHLOROTHIAZIDE 10; 25 MG/1; MG/1
1 TABLET ORAL DAILY
COMMUNITY
End: 2025-01-30

## 2025-01-30 ASSESSMENT — ENCOUNTER SYMPTOMS: RESPIRATORY NEGATIVE: 1

## 2025-01-30 NOTE — PROGRESS NOTES
PROGRESS NOTE    SUBJECTIVE:   Ashley Campbell is a 89 y.o. female seen for a follow up visit regarding   Chief Complaint   Patient presents with    Fall     Had a fall and broke some ribs she states, a couple of weeks ago.         HPI:  Here for follow-up of broken ribs.  This is her second follow-up after her event.  She is doing relatively well, still having some rib pain.  No issues with shortness of breath or coughing.  Blood pressure was elevated today on presentation, she reports that she did not take her medication this morning (I suspect this happens regularly.) she comes in with a bag full of medicines, some of which she is taking insomnia which she is not taking.         Reviewed and updated this visit by provider:  Tobacco  Allergies  Meds  Problems  Med Hx  Surg Hx  Fam Hx           Review of Systems   Respiratory: Negative.     Cardiovascular: Negative.           OBJECTIVE:  Vitals:    01/30/25 1414   BP: (!) 168/82   Resp: 16   Weight: 45.5 kg (100 lb 3.2 oz)   Height: 1.575 m (5' 2.01\")        Physical Exam   General: Alert and oriented x3, frail appearing  Neck: No adenopathy, thyromegaly or thyroid nodules  Pulmonary: Normal effort, good airflow, no rales or rhonchi  CVS: Regular rate and rhythm, normal S1, S2, no S3 or S4, no murmurs; no carotid bruits, 2+ pedal pulses      Medical problems and test results were reviewed with the patient today.     No results found for this or any previous visit (from the past 672 hour(s)).    No results found for any visits on 01/30/25.     ASSESSMENT and PLAN    1. Primary hypertension  Orders:  -     lisinopril (PRINIVIL;ZESTRIL) 20 MG tablet; Take 1 tablet by mouth in the morning and at bedtime, Disp-180 tablet, R-3**Patient requests 90 days supply**Normal  2. Major depressive disorder, recurrent, moderate (HCC), stable     I had another lengthy discussion with this very nice lady, she is in agreement with me that she should be in an assisted living

## 2025-02-12 ENCOUNTER — OFFICE VISIT (OUTPATIENT)
Dept: ORTHOPEDIC SURGERY | Age: 89
End: 2025-02-12
Payer: MEDICARE

## 2025-02-12 DIAGNOSIS — S42.202D CLOSED FRACTURE OF PROXIMAL END OF LEFT HUMERUS WITH ROUTINE HEALING, UNSPECIFIED FRACTURE MORPHOLOGY, SUBSEQUENT ENCOUNTER: Primary | ICD-10-CM

## 2025-02-12 PROCEDURE — 99213 OFFICE O/P EST LOW 20 MIN: CPT | Performed by: PHYSICIAN ASSISTANT

## 2025-02-12 PROCEDURE — 1036F TOBACCO NON-USER: CPT | Performed by: PHYSICIAN ASSISTANT

## 2025-02-12 PROCEDURE — 1123F ACP DISCUSS/DSCN MKR DOCD: CPT | Performed by: PHYSICIAN ASSISTANT

## 2025-02-12 RX ORDER — METHYLPREDNISOLONE ACETATE 40 MG/ML
80 INJECTION, SUSPENSION INTRA-ARTICULAR; INTRALESIONAL; INTRAMUSCULAR; SOFT TISSUE ONCE
Status: COMPLETED | OUTPATIENT
Start: 2025-02-12 | End: 2025-02-12

## 2025-02-12 RX ADMIN — METHYLPREDNISOLONE ACETATE 80 MG: 40 INJECTION, SUSPENSION INTRA-ARTICULAR; INTRALESIONAL; INTRAMUSCULAR; SOFT TISSUE at 11:55

## 2025-02-12 NOTE — PROGRESS NOTES
Name: Ashley Campbell  YOB: 1932  Gender: female  MRN: [id number]    CC:   Chief Complaint   Patient presents with    Follow-up     L Proximal Humerus Fx DOI 11/8/24       FU proximal humerus fx DOI 11-8-24  History of Present Illness  The patient is a 89-year-old female who presents for follow-up of a left shoulder proximal humerus fracture.    She reports a recent fall that resulted in a right rib fracture. The incident occurred when she was holding onto a screen door and attempted to reach for her samara, which subsequently dislodged from her , causing her to fall on her chest. She does not believe this fall exacerbated her shoulder condition. She has been able to perform more activities with her shoulder than anticipated. She reports no numbness or tingling sensations but mentions soreness in her fingers. She occasionally experiences mild pain in her shoulder, which she manages with Tylenol at night. She also has tramadol available for severe pain. She does not have diabetes.    MEDICATIONS  Current: Tylenol, tramadol    Allergies   Allergen Reactions    Penicillins Rash     Past Medical History:   Diagnosis Date    Arrhythmia     medication controlled ; NSR on ekg dated 6-30-12-in EMR    Arthritis     tylenol as needed    Cancer (HCC) 06/2012    skin cancer removed from left leg    Chest pain 03/08/2016    Chronic pain     arthritis    Dyspnea 03/08/2016    GERD (gastroesophageal reflux disease)     well controlled with medication    Headache 1970    Hyperlipidemia 03/08/2016    Hypertension     well controlled with medication    Hyperthyroidism 2000    Irritable bowel syndrome 2000    Kidney stone     once    Nausea & vomiting     Other specified anemias 08/23/2023    Palpitations 03/08/2016    Supraventricular beat, premature 03/08/2016    Urinary incontinence 2015     Past Surgical History:   Procedure Laterality Date    APPENDECTOMY      BREAST BIOPSY      BREAST SURGERY      paula benign

## 2025-02-27 ENCOUNTER — OFFICE VISIT (OUTPATIENT)
Age: 89
End: 2025-02-27
Payer: MEDICARE

## 2025-02-27 VITALS
WEIGHT: 97.8 LBS | HEIGHT: 62 IN | SYSTOLIC BLOOD PRESSURE: 136 MMHG | DIASTOLIC BLOOD PRESSURE: 60 MMHG | HEART RATE: 62 BPM | BODY MASS INDEX: 18 KG/M2

## 2025-02-27 DIAGNOSIS — I25.10 ATHEROSCLEROSIS OF NATIVE CORONARY ARTERY OF NATIVE HEART WITHOUT ANGINA PECTORIS: Primary | ICD-10-CM

## 2025-02-27 DIAGNOSIS — E78.00 PURE HYPERCHOLESTEROLEMIA: ICD-10-CM

## 2025-02-27 DIAGNOSIS — R00.2 PALPITATIONS: ICD-10-CM

## 2025-02-27 DIAGNOSIS — I10 ESSENTIAL HYPERTENSION, BENIGN: ICD-10-CM

## 2025-02-27 DIAGNOSIS — I10 PRIMARY HYPERTENSION: ICD-10-CM

## 2025-02-27 LAB
ANION GAP SERPL CALC-SCNC: 10 MMOL/L (ref 7–16)
BUN SERPL-MCNC: 15 MG/DL (ref 8–23)
CALCIUM SERPL-MCNC: 9.1 MG/DL (ref 8.8–10.2)
CHLORIDE SERPL-SCNC: 97 MMOL/L (ref 98–107)
CO2 SERPL-SCNC: 25 MMOL/L (ref 20–29)
CREAT SERPL-MCNC: 0.63 MG/DL (ref 0.6–1.1)
GLUCOSE SERPL-MCNC: 89 MG/DL (ref 70–99)
MAGNESIUM SERPL-MCNC: 1.9 MG/DL (ref 1.8–2.4)
POTASSIUM SERPL-SCNC: 4.4 MMOL/L (ref 3.5–5.1)
SODIUM SERPL-SCNC: 131 MMOL/L (ref 136–145)

## 2025-02-27 PROCEDURE — G8419 CALC BMI OUT NRM PARAM NOF/U: HCPCS | Performed by: INTERNAL MEDICINE

## 2025-02-27 PROCEDURE — 93000 ELECTROCARDIOGRAM COMPLETE: CPT | Performed by: INTERNAL MEDICINE

## 2025-02-27 PROCEDURE — 1126F AMNT PAIN NOTED NONE PRSNT: CPT | Performed by: INTERNAL MEDICINE

## 2025-02-27 PROCEDURE — 99214 OFFICE O/P EST MOD 30 MIN: CPT | Performed by: INTERNAL MEDICINE

## 2025-02-27 PROCEDURE — 1123F ACP DISCUSS/DSCN MKR DOCD: CPT | Performed by: INTERNAL MEDICINE

## 2025-02-27 PROCEDURE — 1036F TOBACCO NON-USER: CPT | Performed by: INTERNAL MEDICINE

## 2025-02-27 PROCEDURE — G8428 CUR MEDS NOT DOCUMENT: HCPCS | Performed by: INTERNAL MEDICINE

## 2025-02-27 PROCEDURE — 1090F PRES/ABSN URINE INCON ASSESS: CPT | Performed by: INTERNAL MEDICINE

## 2025-02-27 RX ORDER — ACETAMINOPHEN 500 MG
500 TABLET ORAL EVERY 6 HOURS PRN
COMMUNITY

## 2025-02-27 RX ORDER — ROSUVASTATIN CALCIUM 40 MG/1
20 TABLET, COATED ORAL EVERY EVENING
COMMUNITY

## 2025-02-27 NOTE — PROGRESS NOTES
Patient Active Problem List    Diagnosis Date Noted    Depression 02/27/2023     Priority: Medium     Last Assessment & Plan:   Formatting of this note might be different from the original.  Patient is to continue Lexapro for depression and anxiety symptoms.  We thoroughly discussed reducing life stressors and making sure that patient is taking care of herself.  We thoroughly discussed the burden of being the primary caregiver of her .  Due to her husbands rapid decline physically and cognitively, I strongly encouraged patient to consider full-time skilled nursing, hospice, or placing her  in assisted living.  She is interested in exploring these options further.  Her niece was also in agreement with exploring these options further.      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  Patient has had 3 EGDs since 2016 with a history of Garcia's esophagus currently on PPI daily      Weight loss 02/22/2022     Priority: Medium     Last Assessment & Plan:   Formatting of this note might be different from the original.  15 pound unintentional weight loss  Drinks Ensure between meals  Reports a good appetite      Dysuria 12/10/2021     Priority: Medium     Last Assessment & Plan:   Formatting of this note might be different from the original.  Patient's urinalysis showed no signs of UTI.  If patient become symptomatic or fails to improve we can always consider running a urine culture.  Patient does not warrant antibiotics at this time.      Lack of appetite 12/03/2021     Priority: Medium     Last Assessment & Plan:   Formatting of this note might be different from the original.  I had considered starting this patient on Remeron.  However, since she is on Lexapro there could be a medication interaction.  I encouraged the patient to eat 3 meals a day.  Additionally, I encouraged Ensure.  I

## 2025-02-28 ENCOUNTER — TELEPHONE (OUTPATIENT)
Age: 89
End: 2025-02-28

## 2025-02-28 NOTE — TELEPHONE ENCOUNTER
----- Message from Dr. Molina Ham, DO sent at 2/28/2025  8:53 AM EST -----  Please call her, K and Cr ok, good news.  K is better.  NO changes.  Call as needed, see me as planned. Thanks

## 2025-03-03 ENCOUNTER — TELEPHONE (OUTPATIENT)
Dept: FAMILY MEDICINE CLINIC | Facility: CLINIC | Age: 89
End: 2025-03-03

## 2025-03-03 NOTE — TELEPHONE ENCOUNTER
Returned call to patient. Was able to speak with her, and she wanted to schedule an appointment a couple of weeks from now. I got her scheduled.

## 2025-03-03 NOTE — TELEPHONE ENCOUNTER
I have tried calling back twice, and both times I get a dial tone. I called niece's number (on HIPAA) and she states that she is at home. Will try call again in a few mins,

## 2025-03-25 ENCOUNTER — OFFICE VISIT (OUTPATIENT)
Dept: FAMILY MEDICINE CLINIC | Facility: CLINIC | Age: 89
End: 2025-03-25
Payer: MEDICARE

## 2025-03-25 VITALS
DIASTOLIC BLOOD PRESSURE: 76 MMHG | HEIGHT: 62 IN | RESPIRATION RATE: 16 BRPM | SYSTOLIC BLOOD PRESSURE: 124 MMHG | WEIGHT: 102.4 LBS | BODY MASS INDEX: 18.84 KG/M2

## 2025-03-25 DIAGNOSIS — I10 PRIMARY HYPERTENSION: Primary | ICD-10-CM

## 2025-03-25 DIAGNOSIS — R54 FRAIL ELDERLY: ICD-10-CM

## 2025-03-25 DIAGNOSIS — R53.82 CHRONIC FATIGUE: ICD-10-CM

## 2025-03-25 DIAGNOSIS — D50.9 IRON DEFICIENCY ANEMIA, UNSPECIFIED IRON DEFICIENCY ANEMIA TYPE: ICD-10-CM

## 2025-03-25 DIAGNOSIS — Z00.00 MEDICARE ANNUAL WELLNESS VISIT, SUBSEQUENT: ICD-10-CM

## 2025-03-25 LAB
IRON SATN MFR SERPL: 36 % (ref 20–50)
IRON SERPL-MCNC: 114 UG/DL (ref 35–100)
TIBC SERPL-MCNC: 319 UG/DL (ref 240–450)
TSH, 3RD GENERATION: 1.45 UIU/ML (ref 0.27–4.2)
UIBC SERPL-MCNC: 205 UG/DL (ref 112–347)

## 2025-03-25 PROCEDURE — 1159F MED LIST DOCD IN RCRD: CPT | Performed by: FAMILY MEDICINE

## 2025-03-25 PROCEDURE — 1160F RVW MEDS BY RX/DR IN RCRD: CPT | Performed by: FAMILY MEDICINE

## 2025-03-25 PROCEDURE — G8420 CALC BMI NORM PARAMETERS: HCPCS | Performed by: FAMILY MEDICINE

## 2025-03-25 PROCEDURE — 1123F ACP DISCUSS/DSCN MKR DOCD: CPT | Performed by: FAMILY MEDICINE

## 2025-03-25 PROCEDURE — 1126F AMNT PAIN NOTED NONE PRSNT: CPT | Performed by: FAMILY MEDICINE

## 2025-03-25 PROCEDURE — G8427 DOCREV CUR MEDS BY ELIG CLIN: HCPCS | Performed by: FAMILY MEDICINE

## 2025-03-25 PROCEDURE — 99214 OFFICE O/P EST MOD 30 MIN: CPT | Performed by: FAMILY MEDICINE

## 2025-03-25 PROCEDURE — 1036F TOBACCO NON-USER: CPT | Performed by: FAMILY MEDICINE

## 2025-03-25 PROCEDURE — G0439 PPPS, SUBSEQ VISIT: HCPCS | Performed by: FAMILY MEDICINE

## 2025-03-25 PROCEDURE — 1090F PRES/ABSN URINE INCON ASSESS: CPT | Performed by: FAMILY MEDICINE

## 2025-03-25 ASSESSMENT — PATIENT HEALTH QUESTIONNAIRE - PHQ9
5. POOR APPETITE OR OVEREATING: NOT AT ALL
7. TROUBLE CONCENTRATING ON THINGS, SUCH AS READING THE NEWSPAPER OR WATCHING TELEVISION: NOT AT ALL
SUM OF ALL RESPONSES TO PHQ QUESTIONS 1-9: 1
SUM OF ALL RESPONSES TO PHQ QUESTIONS 1-9: 1
4. FEELING TIRED OR HAVING LITTLE ENERGY: NOT AT ALL
SUM OF ALL RESPONSES TO PHQ QUESTIONS 1-9: 1
10. IF YOU CHECKED OFF ANY PROBLEMS, HOW DIFFICULT HAVE THESE PROBLEMS MADE IT FOR YOU TO DO YOUR WORK, TAKE CARE OF THINGS AT HOME, OR GET ALONG WITH OTHER PEOPLE: NOT DIFFICULT AT ALL
8. MOVING OR SPEAKING SO SLOWLY THAT OTHER PEOPLE COULD HAVE NOTICED. OR THE OPPOSITE, BEING SO FIGETY OR RESTLESS THAT YOU HAVE BEEN MOVING AROUND A LOT MORE THAN USUAL: NOT AT ALL
1. LITTLE INTEREST OR PLEASURE IN DOING THINGS: NOT AT ALL
2. FEELING DOWN, DEPRESSED OR HOPELESS: SEVERAL DAYS
SUM OF ALL RESPONSES TO PHQ QUESTIONS 1-9: 1
6. FEELING BAD ABOUT YOURSELF - OR THAT YOU ARE A FAILURE OR HAVE LET YOURSELF OR YOUR FAMILY DOWN: NOT AT ALL
9. THOUGHTS THAT YOU WOULD BE BETTER OFF DEAD, OR OF HURTING YOURSELF: NOT AT ALL
3. TROUBLE FALLING OR STAYING ASLEEP: NOT AT ALL

## 2025-03-25 ASSESSMENT — ENCOUNTER SYMPTOMS: RESPIRATORY NEGATIVE: 1

## 2025-03-25 NOTE — PROGRESS NOTES
PROGRESS NOTE    SUBJECTIVE:   Ashley Campbell is a 89 y.o. female seen for a follow up visit regarding   Chief Complaint   Patient presents with    Follow-up Chronic Condition     Would like to discuss her medication.       GAPS: None     Medicare AWV     Subsequent         HPI:  Here today to follow-up on chronic problems and for AWV.  Overall she is doing relatively well.  She has seen cardiology recently, also had recent blood work, sodium was mildly depressed.  She continues to take hydrochlorothiazide, blood pressure here is lower than goal.         Reviewed and updated this visit by provider:  Tobacco  Allergies  Meds  Problems  Med Hx  Surg Hx  Fam Hx           Review of Systems   Constitutional:  Positive for fatigue.   Respiratory: Negative.     Cardiovascular: Negative.           OBJECTIVE:  Vitals:    03/25/25 1037   BP: 124/76   Resp: 16   Weight: 46.4 kg (102 lb 6.4 oz)   Height: 1.575 m (5' 2.01\")        Physical Exam   General: Alert and oriented x3, very frail  Neck: No adenopathy, thyromegaly or thyroid nodules  Pulmonary: Normal effort, good airflow, no rales or rhonchi  CVS: Regular rate and rhythm, normal S1, S2, no S3 or S4, no murmurs; no carotid bruits, 2+ pedal pulses      Medical problems and test results were reviewed with the patient today.     Recent Results (from the past 4 weeks)   Magnesium    Collection Time: 02/27/25  2:39 PM   Result Value Ref Range    Magnesium 1.9 1.8 - 2.4 mg/dL   Basic Metabolic Panel    Collection Time: 02/27/25  2:39 PM   Result Value Ref Range    Sodium 131 (L) 136 - 145 mmol/L    Potassium 4.4 3.5 - 5.1 mmol/L    Chloride 97 (L) 98 - 107 mmol/L    CO2 25 20 - 29 mmol/L    Anion Gap 10 7 - 16 mmol/L    Glucose 89 70 - 99 mg/dL    BUN 15 8 - 23 MG/DL    Creatinine 0.63 0.60 - 1.10 MG/DL    Est, Glom Filt Rate 83 >60 ml/min/1.73m2    Calcium 9.1 8.8 - 10.2 MG/DL       No results found for any visits on 03/25/25.     ASSESSMENT and PLAN    1. Primary

## 2025-03-26 NOTE — PATIENT INSTRUCTIONS
office, in your home, or at a hospital. The goal is to find out if you are having any problems that could make it hard to care for yourself or that make it unsafe for you to be on your own.  To measure your ADLs, your doctor will ask how hard it is for you to do routine tasks. Your doctor may also want to know if you have changed the way you do a task because of a health problem. Your doctor may watch how you:  Walk back and forth.  Keep your balance while you stand or walk.  Move from sitting to standing or from a bed to a chair.  Button or unbutton a shirt or sweater.  Remove and put on your shoes.  It's common to feel a little worried or anxious if you find you can't do all the things you used to be able to do. Talking with your doctor about ADLs is a way to make sure you're as safe as possible and able to care for yourself as well as you can. You may want to bring a caregiver, friend, or family member to your checkup. They can help you talk to your doctor.  Follow-up care is a key part of your treatment and safety. Be sure to make and go to all appointments, and call your doctor if you are having problems. It's also a good idea to know your test results and keep a list of the medicines you take.  Current as of: October 24, 2024  Content Version: 14.4  © 2024-2025 Done In :60 Second[employer].   Care instructions adapted under license by [employer]. If you have questions about a medical condition or this instruction, always ask your healthcare professional. [employer] disclaims any warranty or liability for your use of this information.         Eating Healthy Foods: Care Instructions  With every meal, you can make healthy food choices. Try to eat a variety of fruits, vegetables, whole grains, lean proteins, and low-fat dairy products. This can help you get the right balance of nutrients, including vitamins and minerals. Small changes add up over time. You can start by adding one healthy food to your

## 2025-04-01 ENCOUNTER — RESULTS FOLLOW-UP (OUTPATIENT)
Dept: FAMILY MEDICINE CLINIC | Facility: CLINIC | Age: 89
End: 2025-04-01

## 2025-04-02 ENCOUNTER — PATIENT MESSAGE (OUTPATIENT)
Dept: FAMILY MEDICINE CLINIC | Facility: CLINIC | Age: 89
End: 2025-04-02

## 2025-04-03 NOTE — TELEPHONE ENCOUNTER
Called and spoke with Leti, per HIPAA and advised of this. She voiced understanding and thanks and will have her go right away.

## 2025-04-22 ENCOUNTER — OFFICE VISIT (OUTPATIENT)
Dept: FAMILY MEDICINE CLINIC | Facility: CLINIC | Age: 89
End: 2025-04-22
Payer: MEDICARE

## 2025-04-22 VITALS
HEIGHT: 62 IN | HEART RATE: 76 BPM | WEIGHT: 99.6 LBS | OXYGEN SATURATION: 94 % | RESPIRATION RATE: 16 BRPM | DIASTOLIC BLOOD PRESSURE: 70 MMHG | BODY MASS INDEX: 18.33 KG/M2 | SYSTOLIC BLOOD PRESSURE: 150 MMHG

## 2025-04-22 DIAGNOSIS — R54 FRAIL ELDERLY: ICD-10-CM

## 2025-04-22 DIAGNOSIS — I10 PRIMARY HYPERTENSION: Primary | ICD-10-CM

## 2025-04-22 DIAGNOSIS — D50.9 IRON DEFICIENCY ANEMIA, UNSPECIFIED IRON DEFICIENCY ANEMIA TYPE: ICD-10-CM

## 2025-04-22 DIAGNOSIS — R29.6 FREQUENT FALLS: ICD-10-CM

## 2025-04-22 PROCEDURE — 1123F ACP DISCUSS/DSCN MKR DOCD: CPT | Performed by: PHYSICIAN ASSISTANT

## 2025-04-22 PROCEDURE — 1090F PRES/ABSN URINE INCON ASSESS: CPT | Performed by: PHYSICIAN ASSISTANT

## 2025-04-22 PROCEDURE — G8419 CALC BMI OUT NRM PARAM NOF/U: HCPCS | Performed by: PHYSICIAN ASSISTANT

## 2025-04-22 PROCEDURE — G8427 DOCREV CUR MEDS BY ELIG CLIN: HCPCS | Performed by: PHYSICIAN ASSISTANT

## 2025-04-22 PROCEDURE — 1159F MED LIST DOCD IN RCRD: CPT | Performed by: PHYSICIAN ASSISTANT

## 2025-04-22 PROCEDURE — 1036F TOBACCO NON-USER: CPT | Performed by: PHYSICIAN ASSISTANT

## 2025-04-22 PROCEDURE — 99214 OFFICE O/P EST MOD 30 MIN: CPT | Performed by: PHYSICIAN ASSISTANT

## 2025-04-22 RX ORDER — HYDROCHLOROTHIAZIDE 12.5 MG/1
12.5 CAPSULE ORAL DAILY
COMMUNITY

## 2025-04-22 ASSESSMENT — ENCOUNTER SYMPTOMS: DIARRHEA: 1

## 2025-04-22 NOTE — PROGRESS NOTES
PROGRESS NOTE    SUBJECTIVE:   Ashley Campbell is a 89 y.o. female seen for a follow up visit regarding   Chief Complaint   Patient presents with    Hypertension     F/u  Weakness    Diarrhea     Diarrhea shortly after eating meals        HPI:  Hypertension    Diarrhea         Pt presents for follow up. Still having some weakness and diarrhea; has dicyclomine for the latter. Was supposed to have stopped her HCTZ for hyponatremia and low BP, but is still taking the HCTZ. She notes she would like to stop her statin, that it doesn't make her feel good.  Has daily caregiver that comes by daily and helps her bathe and dress.     Reviewed and updated this visit by provider:           Review of Systems   Gastrointestinal:  Positive for diarrhea.          OBJECTIVE:  Vitals:    04/22/25 1339   BP: (!) 150/70   BP Site: Left Upper Arm   Patient Position: Sitting   BP Cuff Size: Small Adult   Pulse: 76   Resp: 16   SpO2: 94%   Weight: 45.2 kg (99 lb 9.6 oz)   Height: 1.575 m (5' 2\")        Physical Exam  Constitutional:       General: She is not in acute distress.     Appearance: She is not ill-appearing or toxic-appearing.   HENT:      Head: Normocephalic and atraumatic.   Eyes:      Extraocular Movements: Extraocular movements intact.      Conjunctiva/sclera: Conjunctivae normal.      Pupils: Pupils are equal, round, and reactive to light.   Cardiovascular:      Rate and Rhythm: Normal rate and regular rhythm.      Heart sounds: No murmur heard.  Pulmonary:      Effort: Pulmonary effort is normal.      Breath sounds: Normal breath sounds. No wheezing, rhonchi or rales.   Musculoskeletal:         General: No swelling or deformity.   Skin:     General: Skin is warm and dry.   Neurological:      General: No focal deficit present.      Mental Status: She is alert and oriented to person, place, and time.      Cranial Nerves: No cranial nerve deficit or facial asymmetry.      Motor: No weakness.   Psychiatric:         Mood and

## 2025-05-08 ENCOUNTER — OFFICE VISIT (OUTPATIENT)
Dept: FAMILY MEDICINE CLINIC | Facility: CLINIC | Age: 89
End: 2025-05-08
Payer: MEDICARE

## 2025-05-08 VITALS
OXYGEN SATURATION: 96 % | SYSTOLIC BLOOD PRESSURE: 138 MMHG | BODY MASS INDEX: 19.17 KG/M2 | RESPIRATION RATE: 16 BRPM | WEIGHT: 104.2 LBS | HEIGHT: 62 IN | DIASTOLIC BLOOD PRESSURE: 62 MMHG | HEART RATE: 65 BPM

## 2025-05-08 DIAGNOSIS — M19.012 ARTHRITIS OF LEFT SHOULDER: Primary | ICD-10-CM

## 2025-05-08 DIAGNOSIS — R29.6 FREQUENT FALLS: ICD-10-CM

## 2025-05-08 DIAGNOSIS — S42.202D CLOSED FRACTURE OF PROXIMAL END OF LEFT HUMERUS WITH ROUTINE HEALING, UNSPECIFIED FRACTURE MORPHOLOGY, SUBSEQUENT ENCOUNTER: ICD-10-CM

## 2025-05-08 PROCEDURE — 1159F MED LIST DOCD IN RCRD: CPT | Performed by: PHYSICIAN ASSISTANT

## 2025-05-08 PROCEDURE — 1123F ACP DISCUSS/DSCN MKR DOCD: CPT | Performed by: PHYSICIAN ASSISTANT

## 2025-05-08 PROCEDURE — 1090F PRES/ABSN URINE INCON ASSESS: CPT | Performed by: PHYSICIAN ASSISTANT

## 2025-05-08 PROCEDURE — G8420 CALC BMI NORM PARAMETERS: HCPCS | Performed by: PHYSICIAN ASSISTANT

## 2025-05-08 PROCEDURE — 1036F TOBACCO NON-USER: CPT | Performed by: PHYSICIAN ASSISTANT

## 2025-05-08 PROCEDURE — 99214 OFFICE O/P EST MOD 30 MIN: CPT | Performed by: PHYSICIAN ASSISTANT

## 2025-05-08 PROCEDURE — G8427 DOCREV CUR MEDS BY ELIG CLIN: HCPCS | Performed by: PHYSICIAN ASSISTANT

## 2025-05-08 NOTE — PROGRESS NOTES
PROGRESS NOTE    SUBJECTIVE:   Ashley Campbell is a 89 y.o. female seen for a follow up visit regarding   Chief Complaint   Patient presents with    Shoulder Pain     Referral for PT for left shoulder pain  Had an xray at [facility] and was told that there was nothing broken and she just needs to go to PT        HPI:  Shoulder Pain         Patient presents for follow up on left shoulder pain. Needs referral to PT.     Reviewed and updated this visit by provider:           Review of Systems   Musculoskeletal:  Positive for arthralgias.          OBJECTIVE:  Vitals:    05/08/25 1148   BP: 138/62   BP Site: Left Upper Arm   Patient Position: Sitting   BP Cuff Size: Medium Adult   Pulse: 65   Resp: 16   SpO2: 96%   Weight: 47.3 kg (104 lb 3.2 oz)   Height: 1.575 m (5' 2\")        Physical Exam  Constitutional:       General: She is not in acute distress.     Appearance: She is not ill-appearing or toxic-appearing.   HENT:      Head: Normocephalic and atraumatic.   Eyes:      Extraocular Movements: Extraocular movements intact.      Conjunctiva/sclera: Conjunctivae normal.      Pupils: Pupils are equal, round, and reactive to light.   Cardiovascular:      Rate and Rhythm: Normal rate and regular rhythm.      Heart sounds: No murmur heard.  Pulmonary:      Effort: Pulmonary effort is normal.      Breath sounds: Normal breath sounds. No wheezing, rhonchi or rales.   Musculoskeletal:         General: No swelling.      Comments: Exam of LUE shows- Some decreased lateral abduction, positive Neer's, slightly decreased internal and external rotation of arm at shoulder   Skin:     General: Skin is warm and dry.   Neurological:      General: No focal deficit present.      Mental Status: She is alert and oriented to person, place, and time.      Cranial Nerves: No cranial nerve deficit or facial asymmetry.      Motor: No weakness.   Psychiatric:         Mood and Affect: Mood normal.         Behavior: Behavior normal.         Judgment:

## 2025-05-09 ENCOUNTER — TELEPHONE (OUTPATIENT)
Dept: FAMILY MEDICINE CLINIC | Facility: CLINIC | Age: 89
End: 2025-05-09

## 2025-05-09 NOTE — TELEPHONE ENCOUNTER
Edouard with [facility]  called stating he saw patient for the first time today. Reports BP was slightly high at 152/60. Patient was asymptomatic.    Requesting orders for PT 2 times a week for 4 weeks and also OT.

## 2025-05-22 ENCOUNTER — OFFICE VISIT (OUTPATIENT)
Dept: FAMILY MEDICINE CLINIC | Facility: CLINIC | Age: 89
End: 2025-05-22
Payer: MEDICARE

## 2025-05-22 VITALS
WEIGHT: 101.4 LBS | SYSTOLIC BLOOD PRESSURE: 108 MMHG | DIASTOLIC BLOOD PRESSURE: 68 MMHG | BODY MASS INDEX: 18.66 KG/M2 | HEIGHT: 62 IN | RESPIRATION RATE: 16 BRPM

## 2025-05-22 DIAGNOSIS — E55.9 VITAMIN D DEFICIENCY: Primary | ICD-10-CM

## 2025-05-22 DIAGNOSIS — I10 PRIMARY HYPERTENSION: ICD-10-CM

## 2025-05-22 DIAGNOSIS — E53.8 VITAMIN B12 DEFICIENCY: ICD-10-CM

## 2025-05-22 DIAGNOSIS — E87.1 HYPONATREMIA: ICD-10-CM

## 2025-05-22 LAB
25(OH)D3 SERPL-MCNC: 45.7 NG/ML (ref 30–100)
ANION GAP SERPL CALC-SCNC: 10 MMOL/L (ref 7–16)
BASOPHILS # BLD: 0.02 K/UL (ref 0–0.2)
BASOPHILS NFR BLD: 0.3 % (ref 0–2)
BUN SERPL-MCNC: 10 MG/DL (ref 8–23)
CALCIUM SERPL-MCNC: 9.2 MG/DL (ref 8.8–10.2)
CHLORIDE SERPL-SCNC: 101 MMOL/L (ref 98–107)
CO2 SERPL-SCNC: 24 MMOL/L (ref 20–29)
CREAT SERPL-MCNC: 0.6 MG/DL (ref 0.6–1.1)
DIFFERENTIAL METHOD BLD: ABNORMAL
EOSINOPHIL # BLD: 0.03 K/UL (ref 0–0.8)
EOSINOPHIL NFR BLD: 0.5 % (ref 0.5–7.8)
ERYTHROCYTE [DISTWIDTH] IN BLOOD BY AUTOMATED COUNT: 13.8 % (ref 11.9–14.6)
GLUCOSE SERPL-MCNC: 84 MG/DL (ref 70–99)
HCT VFR BLD AUTO: 37.6 % (ref 35.8–46.3)
HGB BLD-MCNC: 12.1 G/DL (ref 11.7–15.4)
IMM GRANULOCYTES # BLD AUTO: 0.02 K/UL (ref 0–0.5)
IMM GRANULOCYTES NFR BLD AUTO: 0.3 % (ref 0–5)
LYMPHOCYTES # BLD: 1.83 K/UL (ref 0.5–4.6)
LYMPHOCYTES NFR BLD: 31.7 % (ref 13–44)
MCH RBC QN AUTO: 30.8 PG (ref 26.1–32.9)
MCHC RBC AUTO-ENTMCNC: 32.2 G/DL (ref 31.4–35)
MCV RBC AUTO: 95.7 FL (ref 82–102)
MONOCYTES # BLD: 0.54 K/UL (ref 0.1–1.3)
MONOCYTES NFR BLD: 9.4 % (ref 4–12)
NEUTS SEG # BLD: 3.33 K/UL (ref 1.7–8.2)
NEUTS SEG NFR BLD: 57.8 % (ref 43–78)
NRBC # BLD: 0 K/UL (ref 0–0.2)
PLATELET # BLD AUTO: 220 K/UL (ref 150–450)
PMV BLD AUTO: 9 FL (ref 9.4–12.3)
POTASSIUM SERPL-SCNC: 4.4 MMOL/L (ref 3.5–5.1)
RBC # BLD AUTO: 3.93 M/UL (ref 4.05–5.2)
SODIUM SERPL-SCNC: 135 MMOL/L (ref 136–145)
VIT B12 SERPL-MCNC: 446 PG/ML (ref 193–986)
WBC # BLD AUTO: 5.8 K/UL (ref 4.3–11.1)

## 2025-05-22 PROCEDURE — 99214 OFFICE O/P EST MOD 30 MIN: CPT | Performed by: FAMILY MEDICINE

## 2025-05-22 PROCEDURE — G8420 CALC BMI NORM PARAMETERS: HCPCS | Performed by: FAMILY MEDICINE

## 2025-05-22 PROCEDURE — 1125F AMNT PAIN NOTED PAIN PRSNT: CPT | Performed by: FAMILY MEDICINE

## 2025-05-22 PROCEDURE — G8427 DOCREV CUR MEDS BY ELIG CLIN: HCPCS | Performed by: FAMILY MEDICINE

## 2025-05-22 PROCEDURE — 1090F PRES/ABSN URINE INCON ASSESS: CPT | Performed by: FAMILY MEDICINE

## 2025-05-22 PROCEDURE — 1159F MED LIST DOCD IN RCRD: CPT | Performed by: FAMILY MEDICINE

## 2025-05-22 PROCEDURE — 1123F ACP DISCUSS/DSCN MKR DOCD: CPT | Performed by: FAMILY MEDICINE

## 2025-05-22 PROCEDURE — 1036F TOBACCO NON-USER: CPT | Performed by: FAMILY MEDICINE

## 2025-05-22 RX ORDER — LISINOPRIL 10 MG/1
10 TABLET ORAL DAILY
Qty: 90 TABLET | Refills: 1 | Status: SHIPPED | OUTPATIENT
Start: 2025-05-22

## 2025-05-22 ASSESSMENT — PATIENT HEALTH QUESTIONNAIRE - PHQ9
8. MOVING OR SPEAKING SO SLOWLY THAT OTHER PEOPLE COULD HAVE NOTICED. OR THE OPPOSITE, BEING SO FIGETY OR RESTLESS THAT YOU HAVE BEEN MOVING AROUND A LOT MORE THAN USUAL: NOT AT ALL
6. FEELING BAD ABOUT YOURSELF - OR THAT YOU ARE A FAILURE OR HAVE LET YOURSELF OR YOUR FAMILY DOWN: NOT AT ALL
5. POOR APPETITE OR OVEREATING: MORE THAN HALF THE DAYS
SUM OF ALL RESPONSES TO PHQ QUESTIONS 1-9: 8
2. FEELING DOWN, DEPRESSED OR HOPELESS: NEARLY EVERY DAY
3. TROUBLE FALLING OR STAYING ASLEEP: NOT AT ALL
1. LITTLE INTEREST OR PLEASURE IN DOING THINGS: NOT AT ALL
SUM OF ALL RESPONSES TO PHQ QUESTIONS 1-9: 8
7. TROUBLE CONCENTRATING ON THINGS, SUCH AS READING THE NEWSPAPER OR WATCHING TELEVISION: NOT AT ALL
9. THOUGHTS THAT YOU WOULD BE BETTER OFF DEAD, OR OF HURTING YOURSELF: NOT AT ALL
SUM OF ALL RESPONSES TO PHQ QUESTIONS 1-9: 8
10. IF YOU CHECKED OFF ANY PROBLEMS, HOW DIFFICULT HAVE THESE PROBLEMS MADE IT FOR YOU TO DO YOUR WORK, TAKE CARE OF THINGS AT HOME, OR GET ALONG WITH OTHER PEOPLE: NOT DIFFICULT AT ALL
SUM OF ALL RESPONSES TO PHQ QUESTIONS 1-9: 8
4. FEELING TIRED OR HAVING LITTLE ENERGY: NEARLY EVERY DAY

## 2025-05-22 NOTE — PROGRESS NOTES
PROGRESS NOTE    SUBJECTIVE:   Ashley Campbell is a 89 y.o. female seen for a follow up visit regarding   Chief Complaint   Patient presents with    Follow-up     Wants to see what medications she can do away with.   Is weak, Don't feel good, SOB.       GAPS: None         HPI:  Continues to report fatigue, low energy, shortness of breath at times with exertion.         Reviewed and updated this visit by provider:           Review of Systems   Respiratory:  Positive for shortness of breath. Negative for cough.    Cardiovascular: Negative.           OBJECTIVE:  Vitals:    05/22/25 1511   BP: 108/68   Resp: 16   Weight: 46 kg (101 lb 6.4 oz)   Height: 1.575 m (5' 2\")        Physical Exam   General: Alert and oriented x3, frail.  Neck: No adenopathy, thyromegaly or thyroid nodules  Pulmonary: Normal effort, good airflow, no rales or rhonchi  CVS: Regular rate and rhythm, normal S1, S2, no S3 or S4, no murmurs; no carotid bruits, 2+ pedal pulses      Medical problems and test results were reviewed with the patient today.     No results found for this or any previous visit (from the past 4 weeks).    No results found for any visits on 05/22/25.     ASSESSMENT and PLAN    1. Vitamin D deficiency  -     Vitamin D 25 Hydroxy; Future  2. Vitamin B12 deficiency  -     Vitamin B12; Future  3. Primary hypertension  Overview:  Last Assessment & Plan:   Formatting of this note might be different from the original.  Patient's cardiologist made note at her last office visit that they were allowing for permissive hypertension.  Patient's blood pressure was elevated today in the office.  I discussed with the patient that if her blood pressure remains over 160/80-90, she is to take 1 whole tablet of her hypertensive medication.  Right now she is only taking half a tablet.  Patient is to continue to keep a BP log.  Patient voiced understanding and compliant with plan.  Orders:  -     lisinopril (PRINIVIL;ZESTRIL) 10 MG tablet; Take 1

## 2025-05-23 ASSESSMENT — ENCOUNTER SYMPTOMS
SHORTNESS OF BREATH: 1
COUGH: 0

## 2025-06-01 ENCOUNTER — RESULTS FOLLOW-UP (OUTPATIENT)
Dept: FAMILY MEDICINE CLINIC | Facility: CLINIC | Age: 89
End: 2025-06-01

## 2025-06-03 ENCOUNTER — TELEPHONE (OUTPATIENT)
Dept: FAMILY MEDICINE CLINIC | Facility: CLINIC | Age: 89
End: 2025-06-03

## 2025-06-03 ENCOUNTER — OFFICE VISIT (OUTPATIENT)
Dept: FAMILY MEDICINE CLINIC | Facility: CLINIC | Age: 89
End: 2025-06-03
Payer: MEDICARE

## 2025-06-03 VITALS
RESPIRATION RATE: 16 BRPM | DIASTOLIC BLOOD PRESSURE: 68 MMHG | HEIGHT: 62 IN | WEIGHT: 101 LBS | SYSTOLIC BLOOD PRESSURE: 116 MMHG | BODY MASS INDEX: 18.58 KG/M2

## 2025-06-03 DIAGNOSIS — K91.89 POSTCHOLECYSTECTOMY DIARRHEA: Primary | ICD-10-CM

## 2025-06-03 DIAGNOSIS — R19.7 POSTCHOLECYSTECTOMY DIARRHEA: Primary | ICD-10-CM

## 2025-06-03 PROCEDURE — 1090F PRES/ABSN URINE INCON ASSESS: CPT | Performed by: FAMILY MEDICINE

## 2025-06-03 PROCEDURE — G8419 CALC BMI OUT NRM PARAM NOF/U: HCPCS | Performed by: FAMILY MEDICINE

## 2025-06-03 PROCEDURE — 99213 OFFICE O/P EST LOW 20 MIN: CPT | Performed by: FAMILY MEDICINE

## 2025-06-03 PROCEDURE — 1036F TOBACCO NON-USER: CPT | Performed by: FAMILY MEDICINE

## 2025-06-03 PROCEDURE — 1160F RVW MEDS BY RX/DR IN RCRD: CPT | Performed by: FAMILY MEDICINE

## 2025-06-03 PROCEDURE — 1159F MED LIST DOCD IN RCRD: CPT | Performed by: FAMILY MEDICINE

## 2025-06-03 PROCEDURE — G8427 DOCREV CUR MEDS BY ELIG CLIN: HCPCS | Performed by: FAMILY MEDICINE

## 2025-06-03 PROCEDURE — 1123F ACP DISCUSS/DSCN MKR DOCD: CPT | Performed by: FAMILY MEDICINE

## 2025-06-03 RX ORDER — COLESEVELAM 180 1/1
TABLET ORAL
Qty: 90 TABLET | Refills: 5 | Status: SHIPPED | OUTPATIENT
Start: 2025-06-03

## 2025-06-03 ASSESSMENT — ENCOUNTER SYMPTOMS: RESPIRATORY NEGATIVE: 1

## 2025-06-03 NOTE — PROGRESS NOTES
PROGRESS NOTE    SUBJECTIVE:   Ashley Campbell is a 89 y.o. female seen for a follow up visit regarding   Chief Complaint   Patient presents with    Follow-up Chronic Condition     Lab Review  Discuss cutting medication in half.       GAPS: None         HPI:  Here today for follow-up, she wants to discuss her recent lab work, which actually was quite good.  She continues to struggle with fatigue.         Reviewed and updated this visit by provider:           Review of Systems   Respiratory: Negative.     Cardiovascular: Negative.           OBJECTIVE:  Vitals:    06/03/25 1449   BP: 116/68   Resp: 16   Weight: 45.8 kg (101 lb)   Height: 1.575 m (5' 2\")        Physical Exam   General: Alert and oriented x3, frail appearing  Neck: No adenopathy, thyromegaly or thyroid nodules  Pulmonary: Normal effort, good airflow, no rales or rhonchi  CVS: Regular rate and rhythm, normal S1, S2, no S3 or S4, no murmurs; no carotid bruits, 2+ pedal pulses      Medical problems and test results were reviewed with the patient today.     Recent Results (from the past 4 weeks)   Basic Metabolic Panel    Collection Time: 05/22/25  3:51 PM   Result Value Ref Range    Sodium 135 (L) 136 - 145 mmol/L    Potassium 4.4 3.5 - 5.1 mmol/L    Chloride 101 98 - 107 mmol/L    CO2 24 20 - 29 mmol/L    Anion Gap 10 7 - 16 mmol/L    Glucose 84 70 - 99 mg/dL    BUN 10 8 - 23 MG/DL    Creatinine 0.60 0.60 - 1.10 MG/DL    Est, Glom Filt Rate 84 >60 ml/min/1.73m2    Calcium 9.2 8.8 - 10.2 MG/DL   Vitamin B12    Collection Time: 05/22/25  3:51 PM   Result Value Ref Range    Vitamin B-12 446 193 - 986 pg/mL   CBC with Auto Differential    Collection Time: 05/22/25  3:51 PM   Result Value Ref Range    WBC 5.8 4.3 - 11.1 K/uL    RBC 3.93 (L) 4.05 - 5.2 M/uL    Hemoglobin 12.1 11.7 - 15.4 g/dL    Hematocrit 37.6 35.8 - 46.3 %    MCV 95.7 82 - 102 FL    MCH 30.8 26.1 - 32.9 PG    MCHC 32.2 31.4 - 35.0 g/dL    RDW 13.8 11.9 - 14.6 %    Platelets 220 150 - 450 K/uL

## 2025-06-04 ENCOUNTER — TELEPHONE (OUTPATIENT)
Dept: FAMILY MEDICINE CLINIC | Facility: CLINIC | Age: 89
End: 2025-06-04

## 2025-06-04 NOTE — TELEPHONE ENCOUNTER
Patient called and states that her BP is much high this morning and she is not sure of she is supposed to take her medication or not. States that it was 174/66. She waited a while and it was 149/79. Please advise.

## 2025-06-04 NOTE — TELEPHONE ENCOUNTER
Called and spoke with patient about this. She states that HH actually spoke with me earlier today about this and requested VO for Nursing.

## 2025-06-05 ENCOUNTER — TELEPHONE (OUTPATIENT)
Dept: FAMILY MEDICINE CLINIC | Facility: CLINIC | Age: 89
End: 2025-06-05

## 2025-06-13 SDOH — HEALTH STABILITY: PHYSICAL HEALTH: ON AVERAGE, HOW MANY DAYS PER WEEK DO YOU ENGAGE IN MODERATE TO STRENUOUS EXERCISE (LIKE A BRISK WALK)?: 0 DAYS

## 2025-06-13 SDOH — HEALTH STABILITY: PHYSICAL HEALTH: ON AVERAGE, HOW MANY MINUTES DO YOU ENGAGE IN EXERCISE AT THIS LEVEL?: 0 MIN

## 2025-06-16 ENCOUNTER — OFFICE VISIT (OUTPATIENT)
Dept: INTERNAL MEDICINE CLINIC | Facility: CLINIC | Age: 89
End: 2025-06-16
Payer: MEDICARE

## 2025-06-16 ENCOUNTER — CARE COORDINATION (OUTPATIENT)
Dept: CARE COORDINATION | Facility: CLINIC | Age: 89
End: 2025-06-16

## 2025-06-16 VITALS
BODY MASS INDEX: 18.4 KG/M2 | HEIGHT: 62 IN | DIASTOLIC BLOOD PRESSURE: 80 MMHG | WEIGHT: 100 LBS | SYSTOLIC BLOOD PRESSURE: 130 MMHG | HEART RATE: 62 BPM | OXYGEN SATURATION: 96 %

## 2025-06-16 DIAGNOSIS — R19.7 POSTCHOLECYSTECTOMY DIARRHEA: ICD-10-CM

## 2025-06-16 DIAGNOSIS — R63.4 WEIGHT LOSS: ICD-10-CM

## 2025-06-16 DIAGNOSIS — Z91.81 AT HIGH RISK FOR FALLS: ICD-10-CM

## 2025-06-16 DIAGNOSIS — K91.89 POSTCHOLECYSTECTOMY DIARRHEA: ICD-10-CM

## 2025-06-16 DIAGNOSIS — R63.0 LACK OF APPETITE: Primary | ICD-10-CM

## 2025-06-16 DIAGNOSIS — R63.6 UNDERWEIGHT: ICD-10-CM

## 2025-06-16 DIAGNOSIS — R54 FRAIL ELDERLY: ICD-10-CM

## 2025-06-16 DIAGNOSIS — R19.7 DIARRHEA, UNSPECIFIED TYPE: ICD-10-CM

## 2025-06-16 DIAGNOSIS — F33.1 MAJOR DEPRESSIVE DISORDER, RECURRENT, MODERATE (HCC): ICD-10-CM

## 2025-06-16 PROCEDURE — 1036F TOBACCO NON-USER: CPT | Performed by: NURSE PRACTITIONER

## 2025-06-16 PROCEDURE — G8419 CALC BMI OUT NRM PARAM NOF/U: HCPCS | Performed by: NURSE PRACTITIONER

## 2025-06-16 PROCEDURE — G8427 DOCREV CUR MEDS BY ELIG CLIN: HCPCS | Performed by: NURSE PRACTITIONER

## 2025-06-16 PROCEDURE — 99204 OFFICE O/P NEW MOD 45 MIN: CPT | Performed by: NURSE PRACTITIONER

## 2025-06-16 PROCEDURE — 1090F PRES/ABSN URINE INCON ASSESS: CPT | Performed by: NURSE PRACTITIONER

## 2025-06-16 PROCEDURE — 1123F ACP DISCUSS/DSCN MKR DOCD: CPT | Performed by: NURSE PRACTITIONER

## 2025-06-16 PROCEDURE — 1159F MED LIST DOCD IN RCRD: CPT | Performed by: NURSE PRACTITIONER

## 2025-06-16 RX ORDER — POTASSIUM CHLORIDE 750 MG/1
10 TABLET, EXTENDED RELEASE ORAL 2 TIMES DAILY
Qty: 60 TABLET | Refills: 5 | Status: SHIPPED | OUTPATIENT
Start: 2025-06-16

## 2025-06-16 RX ORDER — MIRABEGRON 50 MG/1
50 TABLET, FILM COATED, EXTENDED RELEASE ORAL DAILY
COMMUNITY

## 2025-06-16 RX ORDER — COLESEVELAM 180 1/1
TABLET ORAL
Qty: 90 TABLET | Refills: 5 | Status: SHIPPED | OUTPATIENT
Start: 2025-06-16

## 2025-06-16 RX ORDER — BETAMETHASONE DIPROPIONATE 0.5 MG/G
LOTION TOPICAL
COMMUNITY
Start: 2025-06-09

## 2025-06-16 NOTE — ASSESSMENT & PLAN NOTE
Lives alone, doesn' have a taste for food  Often with diarrhea    Orders:    [facility] - Care Coordination/Social Work - [facility] Care Management

## 2025-06-16 NOTE — ASSESSMENT & PLAN NOTE
Wt Readings from Last 3 Encounters:   06/16/25 45.4 kg (100 lb)   06/03/25 45.8 kg (101 lb)   05/22/25 46 kg (101 lb 6.4 oz)         Orders:    [facility] - Care Coordination/Social Work - [facility] Care Management

## 2025-06-16 NOTE — PROGRESS NOTES
has had several falls. She broke left arm and has had several falls since then. She carries a fall button on her.   She has issues with diarrhea daily, after she eats she has diarrhea. Today she had accident in [email or web address] and her niece had to go to buy her clothes at [employer] today for the visit.   She fell last week and hit her face.   She has had pre-cancers taken off face and nose as well.   She has some anxiety as well.   Her spouse of 60+ years passed away with covid.   She doesn't feel she has a good appetite and when she eats she does get diarrhea. She doesn't wear depends - she puts a loose pad on the panties.     She is accompanied by her niece today (asiya)  She has a walker at home and walking stick at home.   She thinks her niece would be the POA.     She has no energy, she drinks boost.           Review of Systems       Objective   Physical Exam  Vitals reviewed.   Constitutional:       Appearance: Normal appearance. She is not ill-appearing.   Eyes:      Pupils: Pupils are equal, round, and reactive to light.   Cardiovascular:      Rate and Rhythm: Normal rate and regular rhythm.   Pulmonary:      Effort: Pulmonary effort is normal.      Breath sounds: No wheezing or rhonchi.   Musculoskeletal:      Cervical back: Neck supple.   Neurological:      General: No focal deficit present.      Mental Status: She is alert.                  An electronic signature was used to authenticate this note.    --Yuliana Ayala, MARTHA - CNP

## 2025-06-16 NOTE — CARE COORDINATION
Ambulatory Care Coordination Note     6/16/2025 1:56 PM     Care Summary Note:   Direct PCP Referral   - social isolation   - poor appetite - not interested in eating   - declines idea of IVONE  Reminder set for initial call     ACM: Gilma See, ANGELA    PCP/Specialist follow up:   Future Appointments         Provider Specialty Dept Phone    6/25/2025 1:15 PM Molina Ham DO Cardiology [phone]    9/23/2025 11:20 AM Yuliana Ayala APRN - CNP Internal Medicine [phone]

## 2025-06-17 ENCOUNTER — CARE COORDINATION (OUTPATIENT)
Dept: CARE COORDINATION | Facility: CLINIC | Age: 89
End: 2025-06-17

## 2025-06-17 NOTE — CARE COORDINATION
Ambulatory Care Coordination Note     6/17/2025 3:30 PM     Care Summary Note:   Outreach for High Risk Case Management - spoke with patient  Explained role of  - provided contact information  Patient reports she has [employer] family that has been in to see her today   - one of them will do a pharmacy  errand for her.  Conversation brief because patient has house guests     ACM: Gilma See, RN     PCP/Specialist follow up:   Future Appointments         Provider Specialty Dept Phone    6/25/2025 1:15 PM Molina Ham DO Cardiology [phone]    9/23/2025 11:20 AM Yuliana Ayala, MARTHA - CNP Internal Medicine [phone]

## 2025-06-25 ENCOUNTER — OFFICE VISIT (OUTPATIENT)
Age: 89
End: 2025-06-25
Payer: MEDICARE

## 2025-06-25 VITALS
DIASTOLIC BLOOD PRESSURE: 90 MMHG | HEART RATE: 72 BPM | WEIGHT: 99 LBS | SYSTOLIC BLOOD PRESSURE: 160 MMHG | BODY MASS INDEX: 18.22 KG/M2 | HEIGHT: 62 IN

## 2025-06-25 DIAGNOSIS — Z86.73 HISTORY OF STROKE: ICD-10-CM

## 2025-06-25 DIAGNOSIS — R00.2 PALPITATIONS: ICD-10-CM

## 2025-06-25 DIAGNOSIS — I25.10 ATHEROSCLEROSIS OF NATIVE CORONARY ARTERY OF NATIVE HEART WITHOUT ANGINA PECTORIS: Primary | ICD-10-CM

## 2025-06-25 DIAGNOSIS — I10 ESSENTIAL HYPERTENSION, BENIGN: ICD-10-CM

## 2025-06-25 DIAGNOSIS — E78.00 PURE HYPERCHOLESTEROLEMIA: ICD-10-CM

## 2025-06-25 PROCEDURE — G8419 CALC BMI OUT NRM PARAM NOF/U: HCPCS | Performed by: INTERNAL MEDICINE

## 2025-06-25 PROCEDURE — 99213 OFFICE O/P EST LOW 20 MIN: CPT | Performed by: INTERNAL MEDICINE

## 2025-06-25 PROCEDURE — 1126F AMNT PAIN NOTED NONE PRSNT: CPT | Performed by: INTERNAL MEDICINE

## 2025-06-25 PROCEDURE — 1090F PRES/ABSN URINE INCON ASSESS: CPT | Performed by: INTERNAL MEDICINE

## 2025-06-25 PROCEDURE — 1036F TOBACCO NON-USER: CPT | Performed by: INTERNAL MEDICINE

## 2025-06-25 PROCEDURE — 1123F ACP DISCUSS/DSCN MKR DOCD: CPT | Performed by: INTERNAL MEDICINE

## 2025-06-25 PROCEDURE — G8428 CUR MEDS NOT DOCUMENT: HCPCS | Performed by: INTERNAL MEDICINE

## 2025-06-25 RX ORDER — PROMETHAZINE HYDROCHLORIDE 12.5 MG/1
12.5 TABLET ORAL EVERY 12 HOURS PRN
COMMUNITY

## 2025-06-25 RX ORDER — MULTIVITAMIN WITH IRON
250 TABLET ORAL DAILY
COMMUNITY

## 2025-06-25 RX ORDER — ZINC SULFATE 50(220)MG
50 CAPSULE ORAL DAILY
COMMUNITY

## 2025-06-25 NOTE — PROGRESS NOTES
[address] [place] [address], [place] [address]  PHONE: [phone]     25    NAME:  Ashley Campbell  : 1932  MRN: [id number]       SUBJECTIVE:   Ashely Campbell is a 89 y.o. female seen for a follow up visit regarding the following:     Chief Complaint   Patient presents with    Coronary Artery Disease    Follow-up       HPI:   Here for CAD:      [facility] 2019:   1.  Normal left ventricular function.   2.  Minor coronary artery disease.  At the junction of the middle and distal left anterior descending segments there is focal kinking of the vessel which could be a potential source of angina.  Echo 3/2023: normal EF      since 2022.      Here with family today.    Still living alone, performing ADLs ok.  BP can run higher.  Some CARCAMO.  No edema.   She worries.        Patient denies recent history of orthopnea, PND, excessive dizziness and/or syncope.        Past Medical History, Past Surgical History, Family history, Social History, and Medications were all reviewed with the patient today and updated as necessary.     Current Outpatient Medications   Medication Sig Dispense Refill    magnesium (MAGNESIUM-OXIDE) 250 MG TABS tablet Take 1 tablet by mouth daily      zinc sulfate (ZINCATE) 220 (50 Zn)  mg capsule - elemental zinc Take 1 capsule by mouth daily      Calcium Carb-Cholecalciferol (CALCIUM 600 + D PO) Take by mouth      promethazine (PHENERGAN) 12.5 MG tablet Take 1 tablet by mouth every 12 hours as needed for Nausea      mirabegron (MYRBETRIQ) 50 MG TB24 Take 50 mg by mouth daily      Multiple Vitamins-Minerals (EMERGEN-C IMMUNE PLUS PO) Take by mouth      potassium chloride (KLOR-CON) 10 MEQ extended release tablet Take 1 tablet by mouth 2 times daily 60 tablet 5    lisinopril (PRINIVIL;ZESTRIL) 10 MG tablet Take 1 tablet by mouth daily 90 tablet 1    acetaminophen (TYLENOL) 500 MG tablet Take 1 tablet by mouth every 6 hours as needed for Pain      AZO CRANBERRY

## 2025-06-26 ENCOUNTER — CARE COORDINATION (OUTPATIENT)
Dept: CARE COORDINATION | Facility: CLINIC | Age: 89
End: 2025-06-26

## 2025-06-26 NOTE — CARE COORDINATION
Ambulatory Care Coordination Note     6/26/2025 2:08 PM    Care Summary Note:   Defer patient at this time  Declines need for case management   - [facility] support system   - has transportation   - taking care of herself without difficulty     ACM: Gilma See, ANGELA     Method of communication with provider: chart routing.        PCP/Specialist follow up:   Future Appointments         Provider Specialty Dept Phone    9/23/2025 11:20 AM Yuliana Ayala APRN - CNP Internal Medicine [phone]    12/16/2025 1:45 PM Molina Ham, DO Cardiology [phone]

## 2025-06-29 ENCOUNTER — PATIENT MESSAGE (OUTPATIENT)
Dept: INTERNAL MEDICINE CLINIC | Facility: CLINIC | Age: 89
End: 2025-06-29

## 2025-06-30 ENCOUNTER — CARE COORDINATION (OUTPATIENT)
Dept: CARE COORDINATION | Facility: CLINIC | Age: 89
End: 2025-06-30

## 2025-08-07 ENCOUNTER — OFFICE VISIT (OUTPATIENT)
Dept: INTERNAL MEDICINE CLINIC | Facility: CLINIC | Age: 89
End: 2025-08-07
Payer: MEDICARE

## 2025-08-07 VITALS
TEMPERATURE: 98.5 F | SYSTOLIC BLOOD PRESSURE: 130 MMHG | HEART RATE: 63 BPM | BODY MASS INDEX: 18.22 KG/M2 | OXYGEN SATURATION: 96 % | WEIGHT: 99 LBS | HEIGHT: 62 IN | DIASTOLIC BLOOD PRESSURE: 80 MMHG

## 2025-08-07 DIAGNOSIS — R19.7 POSTCHOLECYSTECTOMY DIARRHEA: ICD-10-CM

## 2025-08-07 DIAGNOSIS — K91.89 POSTCHOLECYSTECTOMY DIARRHEA: ICD-10-CM

## 2025-08-07 DIAGNOSIS — K22.70 BARRETT'S ESOPHAGUS WITHOUT DYSPLASIA: ICD-10-CM

## 2025-08-07 DIAGNOSIS — F33.0 MAJOR DEPRESSIVE DISORDER, RECURRENT, MILD: ICD-10-CM

## 2025-08-07 DIAGNOSIS — I10 PRIMARY HYPERTENSION: ICD-10-CM

## 2025-08-07 DIAGNOSIS — F51.04 PSYCHOPHYSIOLOGICAL INSOMNIA: ICD-10-CM

## 2025-08-07 DIAGNOSIS — Z00.00 MEDICARE ANNUAL WELLNESS VISIT, SUBSEQUENT: Primary | ICD-10-CM

## 2025-08-07 LAB
ALBUMIN SERPL-MCNC: 3.5 G/DL (ref 3.2–4.6)
ALBUMIN/GLOB SERPL: 1 (ref 1–1.9)
ALP SERPL-CCNC: 94 U/L (ref 35–104)
ALT SERPL-CCNC: 48 U/L (ref 8–45)
ANION GAP SERPL CALC-SCNC: 11 MMOL/L (ref 7–16)
AST SERPL-CCNC: 41 U/L (ref 15–37)
BILIRUB SERPL-MCNC: 0.4 MG/DL (ref 0–1.2)
BUN SERPL-MCNC: 10 MG/DL (ref 8–23)
CALCIUM SERPL-MCNC: 9.3 MG/DL (ref 8.8–10.2)
CHLORIDE SERPL-SCNC: 103 MMOL/L (ref 98–107)
CO2 SERPL-SCNC: 25 MMOL/L (ref 20–29)
CREAT SERPL-MCNC: 0.66 MG/DL (ref 0.6–1.1)
ERYTHROCYTE [DISTWIDTH] IN BLOOD BY AUTOMATED COUNT: 13.4 % (ref 11.9–14.6)
GLOBULIN SER CALC-MCNC: 3.5 G/DL (ref 2.3–3.5)
GLUCOSE SERPL-MCNC: 86 MG/DL (ref 70–99)
HCT VFR BLD AUTO: 38.8 % (ref 35.8–46.3)
HGB BLD-MCNC: 12.6 G/DL (ref 11.7–15.4)
LIPASE SERPL-CCNC: 17 U/L (ref 13–60)
MCH RBC QN AUTO: 30.7 PG (ref 26.1–32.9)
MCHC RBC AUTO-ENTMCNC: 32.5 G/DL (ref 31.4–35)
MCV RBC AUTO: 94.6 FL (ref 82–102)
NRBC # BLD: 0 K/UL (ref 0–0.2)
PLATELET # BLD AUTO: 179 K/UL (ref 150–450)
PMV BLD AUTO: 9.1 FL (ref 9.4–12.3)
POTASSIUM SERPL-SCNC: 3.9 MMOL/L (ref 3.5–5.1)
PROT SERPL-MCNC: 7 G/DL (ref 6.3–8.2)
RBC # BLD AUTO: 4.1 M/UL (ref 4.05–5.2)
SODIUM SERPL-SCNC: 139 MMOL/L (ref 136–145)
TSH W FREE THYROID IF ABNORMAL: 1.03 UIU/ML (ref 0.27–4.2)
WBC # BLD AUTO: 4.7 K/UL (ref 4.3–11.1)

## 2025-08-07 PROCEDURE — G8419 CALC BMI OUT NRM PARAM NOF/U: HCPCS | Performed by: NURSE PRACTITIONER

## 2025-08-07 PROCEDURE — G0439 PPPS, SUBSEQ VISIT: HCPCS | Performed by: NURSE PRACTITIONER

## 2025-08-07 PROCEDURE — 1090F PRES/ABSN URINE INCON ASSESS: CPT | Performed by: NURSE PRACTITIONER

## 2025-08-07 PROCEDURE — 1036F TOBACCO NON-USER: CPT | Performed by: NURSE PRACTITIONER

## 2025-08-07 PROCEDURE — 99214 OFFICE O/P EST MOD 30 MIN: CPT | Performed by: NURSE PRACTITIONER

## 2025-08-07 PROCEDURE — 1123F ACP DISCUSS/DSCN MKR DOCD: CPT | Performed by: NURSE PRACTITIONER

## 2025-08-07 PROCEDURE — G8427 DOCREV CUR MEDS BY ELIG CLIN: HCPCS | Performed by: NURSE PRACTITIONER

## 2025-08-07 PROCEDURE — 1159F MED LIST DOCD IN RCRD: CPT | Performed by: NURSE PRACTITIONER

## 2025-08-07 RX ORDER — COLESEVELAM 180 1/1
TABLET ORAL
Qty: 90 TABLET | Refills: 5 | Status: SHIPPED | OUTPATIENT
Start: 2025-08-07

## 2025-08-07 RX ORDER — POTASSIUM CHLORIDE 750 MG/1
10 TABLET, EXTENDED RELEASE ORAL 2 TIMES DAILY
Qty: 100 TABLET | Refills: 3 | Status: SHIPPED | OUTPATIENT
Start: 2025-08-07

## 2025-08-07 RX ORDER — OMEPRAZOLE 40 MG/1
40 CAPSULE, DELAYED RELEASE ORAL DAILY
Qty: 100 CAPSULE | Refills: 3 | Status: SHIPPED | OUTPATIENT
Start: 2025-08-07

## 2025-08-07 RX ORDER — DICYCLOMINE HYDROCHLORIDE 10 MG/1
10 CAPSULE ORAL 3 TIMES DAILY
Qty: 300 CAPSULE | Refills: 3 | Status: CANCELLED | OUTPATIENT
Start: 2025-08-07

## 2025-08-07 RX ORDER — TRAZODONE HYDROCHLORIDE 50 MG/1
TABLET ORAL
Qty: 30 TABLET | Refills: 1 | Status: SHIPPED | OUTPATIENT
Start: 2025-08-07

## 2025-08-07 RX ORDER — ESCITALOPRAM OXALATE 20 MG/1
20 TABLET ORAL DAILY
Qty: 100 TABLET | Refills: 3 | Status: SHIPPED | OUTPATIENT
Start: 2025-08-07

## 2025-08-07 RX ORDER — LISINOPRIL 10 MG/1
10 TABLET ORAL DAILY
Qty: 100 TABLET | Refills: 3 | Status: SHIPPED | OUTPATIENT
Start: 2025-08-07

## 2025-08-07 ASSESSMENT — PATIENT HEALTH QUESTIONNAIRE - PHQ9
8. MOVING OR SPEAKING SO SLOWLY THAT OTHER PEOPLE COULD HAVE NOTICED. OR THE OPPOSITE, BEING SO FIGETY OR RESTLESS THAT YOU HAVE BEEN MOVING AROUND A LOT MORE THAN USUAL: NOT AT ALL
7. TROUBLE CONCENTRATING ON THINGS, SUCH AS READING THE NEWSPAPER OR WATCHING TELEVISION: NOT AT ALL
2. FEELING DOWN, DEPRESSED OR HOPELESS: SEVERAL DAYS
SUM OF ALL RESPONSES TO PHQ QUESTIONS 1-9: 11
SUM OF ALL RESPONSES TO PHQ QUESTIONS 1-9: 11
4. FEELING TIRED OR HAVING LITTLE ENERGY: NEARLY EVERY DAY
SUM OF ALL RESPONSES TO PHQ QUESTIONS 1-9: 2
10. IF YOU CHECKED OFF ANY PROBLEMS, HOW DIFFICULT HAVE THESE PROBLEMS MADE IT FOR YOU TO DO YOUR WORK, TAKE CARE OF THINGS AT HOME, OR GET ALONG WITH OTHER PEOPLE: NOT DIFFICULT AT ALL
6. FEELING BAD ABOUT YOURSELF - OR THAT YOU ARE A FAILURE OR HAVE LET YOURSELF OR YOUR FAMILY DOWN: NOT AT ALL
1. LITTLE INTEREST OR PLEASURE IN DOING THINGS: SEVERAL DAYS
2. FEELING DOWN, DEPRESSED OR HOPELESS: SEVERAL DAYS
1. LITTLE INTEREST OR PLEASURE IN DOING THINGS: SEVERAL DAYS
3. TROUBLE FALLING OR STAYING ASLEEP: NEARLY EVERY DAY
5. POOR APPETITE OR OVEREATING: NEARLY EVERY DAY
SUM OF ALL RESPONSES TO PHQ QUESTIONS 1-9: 11
SUM OF ALL RESPONSES TO PHQ QUESTIONS 1-9: 2
9. THOUGHTS THAT YOU WOULD BE BETTER OFF DEAD, OR OF HURTING YOURSELF: NOT AT ALL
SUM OF ALL RESPONSES TO PHQ QUESTIONS 1-9: 2
SUM OF ALL RESPONSES TO PHQ QUESTIONS 1-9: 2
SUM OF ALL RESPONSES TO PHQ QUESTIONS 1-9: 11

## 2025-08-08 DIAGNOSIS — R19.7 DIARRHEA, UNSPECIFIED TYPE: ICD-10-CM

## 2025-08-08 LAB
C. DIFFICILE TOXIN MOLECULAR: NEGATIVE
HEMOCCULT STL QL: NEGATIVE

## 2025-08-09 LAB
C COLI+JEJUNI TUF STL QL NAA+PROBE: NOT DETECTED
CRYPTOSPORIDIUM PCR: NOT DETECTED
EC STX1+STX2 GENES STL QL NAA+PROBE: NOT DETECTED
ENTAMOEBA HISTOLYTICA PCR: NOT DETECTED
ETEC ELTA+ESTB GENES STL QL NAA+PROBE: NOT DETECTED
GIARDIA LAMBLIA PCR: NOT DETECTED
P SHIGELLOIDES DNA STL QL NAA+PROBE: NOT DETECTED
SALMONELLA SP SPAO STL QL NAA+PROBE: NOT DETECTED
SHIGELLA SP+EIEC IPAH STL QL NAA+PROBE: NOT DETECTED
V CHOL+PARA+VUL DNA STL QL NAA+NON-PROBE: NOT DETECTED
Y ENTEROCOL DNA STL QL NAA+NON-PROBE: NOT DETECTED

## 2025-08-10 ENCOUNTER — PATIENT MESSAGE (OUTPATIENT)
Dept: INTERNAL MEDICINE CLINIC | Facility: CLINIC | Age: 89
End: 2025-08-10

## 2025-08-11 ENCOUNTER — RESULTS FOLLOW-UP (OUTPATIENT)
Dept: INTERNAL MEDICINE CLINIC | Facility: CLINIC | Age: 89
End: 2025-08-11

## 2025-08-11 DIAGNOSIS — R79.89 ABNORMAL LFTS: Primary | ICD-10-CM

## 2025-08-11 DIAGNOSIS — R94.5 ABNORMAL RESULTS OF LIVER FUNCTION STUDIES: ICD-10-CM

## 2025-08-13 ENCOUNTER — OFFICE VISIT (OUTPATIENT)
Age: 89
End: 2025-08-13
Payer: MEDICARE

## 2025-08-13 VITALS
RESPIRATION RATE: 17 BRPM | HEART RATE: 67 BPM | BODY MASS INDEX: 18.22 KG/M2 | DIASTOLIC BLOOD PRESSURE: 82 MMHG | WEIGHT: 99 LBS | OXYGEN SATURATION: 95 % | SYSTOLIC BLOOD PRESSURE: 172 MMHG | HEIGHT: 62 IN

## 2025-08-13 DIAGNOSIS — K83.8 DILATION OF BILIARY TRACT: Primary | ICD-10-CM

## 2025-08-13 DIAGNOSIS — K44.9 LARGE HIATAL HERNIA: ICD-10-CM

## 2025-08-13 DIAGNOSIS — Z87.19 HISTORY OF BARRETT'S ESOPHAGUS: ICD-10-CM

## 2025-08-13 DIAGNOSIS — K52.9 POSTPRANDIAL DIARRHEA: ICD-10-CM

## 2025-08-13 DIAGNOSIS — K86.89 DILATION OF PANCREATIC DUCT: ICD-10-CM

## 2025-08-13 DIAGNOSIS — R63.4 UNINTENTIONAL WEIGHT LOSS: ICD-10-CM

## 2025-08-13 PROCEDURE — 1090F PRES/ABSN URINE INCON ASSESS: CPT | Performed by: PHYSICIAN ASSISTANT

## 2025-08-13 PROCEDURE — 1160F RVW MEDS BY RX/DR IN RCRD: CPT | Performed by: PHYSICIAN ASSISTANT

## 2025-08-13 PROCEDURE — G8419 CALC BMI OUT NRM PARAM NOF/U: HCPCS | Performed by: PHYSICIAN ASSISTANT

## 2025-08-13 PROCEDURE — 99204 OFFICE O/P NEW MOD 45 MIN: CPT | Performed by: PHYSICIAN ASSISTANT

## 2025-08-13 PROCEDURE — G8427 DOCREV CUR MEDS BY ELIG CLIN: HCPCS | Performed by: PHYSICIAN ASSISTANT

## 2025-08-13 PROCEDURE — 1036F TOBACCO NON-USER: CPT | Performed by: PHYSICIAN ASSISTANT

## 2025-08-13 PROCEDURE — 1159F MED LIST DOCD IN RCRD: CPT | Performed by: PHYSICIAN ASSISTANT

## 2025-08-13 PROCEDURE — 1123F ACP DISCUSS/DSCN MKR DOCD: CPT | Performed by: PHYSICIAN ASSISTANT

## 2025-08-14 LAB
SPECIMEN SOURCE: NORMAL
WHITE BLOOD CELLS (WBC), STOOL: NORMAL

## (undated) DEVICE — BLOCK BITE AD 60FR W/ VELC STRP ADDRESSES MOST PT AND

## (undated) DEVICE — CONNECTOR TBNG OD5-7MM O2 END DISP

## (undated) DEVICE — CANNULA NSL ORAL AD FOR CAPNOFLEX CO2 O2 AIRLFE

## (undated) DEVICE — FORCEPS BX L240CM JAW DIA2.8MM L CAP W/ NDL MIC MESH TOOTH

## (undated) DEVICE — CONTAINER PREFIL FRMLN 40ML --

## (undated) DEVICE — KENDALL RADIOLUCENT FOAM MONITORING ELECTRODE RECTANGULAR SHAPE: Brand: KENDALL